# Patient Record
Sex: MALE | Race: WHITE | NOT HISPANIC OR LATINO | Employment: OTHER | URBAN - METROPOLITAN AREA
[De-identification: names, ages, dates, MRNs, and addresses within clinical notes are randomized per-mention and may not be internally consistent; named-entity substitution may affect disease eponyms.]

---

## 2018-05-07 ENCOUNTER — OFFICE VISIT CONVERTED (OUTPATIENT)
Dept: FAMILY MEDICINE CLINIC | Facility: CLINIC | Age: 53
End: 2018-05-07
Attending: FAMILY MEDICINE

## 2019-03-04 ENCOUNTER — OFFICE VISIT CONVERTED (OUTPATIENT)
Dept: PODIATRY | Facility: CLINIC | Age: 54
End: 2019-03-04
Attending: PODIATRIST

## 2019-04-05 ENCOUNTER — HOSPITAL ENCOUNTER (OUTPATIENT)
Dept: LAB | Facility: HOSPITAL | Age: 54
Discharge: HOME | End: 2019-04-05
Attending: PHYSICIAN ASSISTANT

## 2019-04-05 LAB
ALBUMIN SERPL-MCNC: 4.5 G/DL (ref 3.5–5)
ALBUMIN/GLOB SERPL: 1.6 {RATIO} (ref 1.4–2.6)
ALP SERPL-CCNC: 52 U/L (ref 56–119)
ALT SERPL-CCNC: 43 U/L (ref 10–40)
ANION GAP SERPL CALC-SCNC: 21 MMOL/L (ref 8–19)
APPEARANCE UR: CLEAR
AST SERPL-CCNC: 33 U/L (ref 15–50)
BASOPHILS # BLD AUTO: 0.04 10*3/UL (ref 0–0.2)
BASOPHILS NFR BLD AUTO: 0.5 % (ref 0–3)
BILIRUB SERPL-MCNC: 0.68 MG/DL (ref 0.2–1.3)
BILIRUB UR QL: NEGATIVE
BUN SERPL-MCNC: 16 MG/DL (ref 5–25)
BUN/CREAT SERPL: 12 {RATIO} (ref 6–20)
CALCIUM SERPL-MCNC: 9.5 MG/DL (ref 8.7–10.4)
CHLORIDE SERPL-SCNC: 101 MMOL/L (ref 99–111)
CHOLEST SERPL-MCNC: 195 MG/DL (ref 107–200)
CHOLEST/HDLC SERPL: 3.9 {RATIO} (ref 3–6)
COLOR UR: YELLOW
CONV ABS IMM GRAN: 0.05 10*3/UL (ref 0–0.2)
CONV CO2: 22 MMOL/L (ref 22–32)
CONV COLLECTION SOURCE (UA): NORMAL
CONV IMMATURE GRAN: 0.6 % (ref 0–1.8)
CONV TOTAL PROTEIN: 7.3 G/DL (ref 6.3–8.2)
CONV UROBILINOGEN IN URINE BY AUTOMATED TEST STRIP: 0.2 {EHRLICHU}/DL (ref 0.1–1)
CREAT UR-MCNC: 1.32 MG/DL (ref 0.7–1.2)
DEPRECATED RDW RBC AUTO: 46.2 FL (ref 35.1–43.9)
EOSINOPHIL # BLD AUTO: 0.2 10*3/UL (ref 0–0.7)
EOSINOPHIL # BLD AUTO: 2.4 % (ref 0–7)
ERYTHROCYTE [DISTWIDTH] IN BLOOD BY AUTOMATED COUNT: 13 % (ref 11.6–14.4)
GFR SERPLBLD BASED ON 1.73 SQ M-ARVRAT: >60 ML/MIN/{1.73_M2}
GLOBULIN UR ELPH-MCNC: 2.8 G/DL (ref 2–3.5)
GLUCOSE SERPL-MCNC: 93 MG/DL (ref 70–99)
GLUCOSE UR QL: NEGATIVE MG/DL
HBA1C MFR BLD: 16.8 G/DL (ref 14–18)
HCT VFR BLD AUTO: 51.5 % (ref 42–52)
HDLC SERPL-MCNC: 50 MG/DL (ref 40–60)
HGB UR QL STRIP: NEGATIVE
KETONES UR QL STRIP: NEGATIVE MG/DL
LDLC SERPL CALC-MCNC: 99 MG/DL (ref 70–100)
LEUKOCYTE ESTERASE UR QL STRIP: NEGATIVE
LYMPHOCYTES # BLD AUTO: 1.66 10*3/UL (ref 1–5)
MCH RBC QN AUTO: 31.5 PG (ref 27–31)
MCHC RBC AUTO-ENTMCNC: 32.6 G/DL (ref 33–37)
MCV RBC AUTO: 96.6 FL (ref 80–96)
MONOCYTES # BLD AUTO: 0.93 10*3/UL (ref 0.2–1.2)
MONOCYTES NFR BLD AUTO: 11.1 % (ref 3–10)
NEUTROPHILS # BLD AUTO: 5.49 10*3/UL (ref 2–8)
NEUTROPHILS NFR BLD AUTO: 65.6 % (ref 30–85)
NITRITE UR QL STRIP: NEGATIVE
NRBC CBCN: 0 % (ref 0–0.7)
OSMOLALITY SERPL CALC.SUM OF ELEC: 291 MOSM/KG (ref 273–304)
PH UR STRIP.AUTO: 5.5 [PH] (ref 5–8)
PLATELET # BLD AUTO: 226 10*3/UL (ref 130–400)
PMV BLD AUTO: 11.4 FL (ref 9.4–12.4)
POTASSIUM SERPL-SCNC: 4.2 MMOL/L (ref 3.5–5.3)
PROT UR QL: NEGATIVE MG/DL
PSA SERPL-MCNC: 1.17 NG/ML (ref 0–4)
RBC # BLD AUTO: 5.33 10*6/UL (ref 4.7–6.1)
SODIUM SERPL-SCNC: 140 MMOL/L (ref 135–147)
SP GR UR: 1.02 (ref 1–1.03)
T4 FREE SERPL-MCNC: 1.1 NG/DL (ref 0.9–1.8)
TESTOST SERPL-MCNC: 432 NG/DL (ref 193–740)
TRIGL SERPL-MCNC: 231 MG/DL (ref 40–150)
TSH SERPL-ACNC: 6.33 M[IU]/L (ref 0.27–4.2)
VARIANT LYMPHS NFR BLD MANUAL: 19.8 % (ref 20–45)
VLDLC SERPL-MCNC: 46 MG/DL (ref 5–37)
WBC # BLD AUTO: 8.37 10*3/UL (ref 4.8–10.8)

## 2019-04-19 ENCOUNTER — OFFICE VISIT CONVERTED (OUTPATIENT)
Dept: FAMILY MEDICINE CLINIC | Facility: CLINIC | Age: 54
End: 2019-04-19
Attending: PHYSICIAN ASSISTANT

## 2019-06-20 ENCOUNTER — HOSPITAL ENCOUNTER (OUTPATIENT)
Dept: LAB | Facility: HOSPITAL | Age: 54
Discharge: HOME | End: 2019-06-20
Attending: PHYSICIAN ASSISTANT

## 2019-06-20 LAB
25(OH)D3 SERPL-MCNC: 45.7 NG/ML (ref 30–100)
ALBUMIN SERPL-MCNC: 4.2 G/DL (ref 3.5–5)
ALBUMIN/GLOB SERPL: 1.6 {RATIO} (ref 1.4–2.6)
ALP SERPL-CCNC: 50 U/L (ref 56–119)
ALT SERPL-CCNC: 44 U/L (ref 10–40)
ANION GAP SERPL CALC-SCNC: 16 MMOL/L (ref 8–19)
APPEARANCE UR: CLEAR
AST SERPL-CCNC: 28 U/L (ref 15–50)
BASOPHILS # BLD AUTO: 0.05 10*3/UL (ref 0–0.2)
BASOPHILS NFR BLD AUTO: 0.6 % (ref 0–3)
BILIRUB SERPL-MCNC: 0.65 MG/DL (ref 0.2–1.3)
BILIRUB UR QL: NEGATIVE
BUN SERPL-MCNC: 14 MG/DL (ref 5–25)
BUN/CREAT SERPL: 12 {RATIO} (ref 6–20)
CALCIUM SERPL-MCNC: 9.1 MG/DL (ref 8.7–10.4)
CHLORIDE SERPL-SCNC: 100 MMOL/L (ref 99–111)
CHOLEST SERPL-MCNC: 184 MG/DL (ref 107–200)
CHOLEST/HDLC SERPL: 4.2 {RATIO} (ref 3–6)
COLOR UR: YELLOW
CONV ABS IMM GRAN: 0.04 10*3/UL (ref 0–0.2)
CONV CO2: 25 MMOL/L (ref 22–32)
CONV COLLECTION SOURCE (UA): NORMAL
CONV IMMATURE GRAN: 0.5 % (ref 0–1.8)
CONV TOTAL PROTEIN: 6.8 G/DL (ref 6.3–8.2)
CONV UROBILINOGEN IN URINE BY AUTOMATED TEST STRIP: 0.2 {EHRLICHU}/DL (ref 0.1–1)
CREAT UR-MCNC: 1.13 MG/DL (ref 0.7–1.2)
DEPRECATED RDW RBC AUTO: 47.7 FL (ref 35.1–43.9)
EOSINOPHIL # BLD AUTO: 0.23 10*3/UL (ref 0–0.7)
EOSINOPHIL # BLD AUTO: 3 % (ref 0–7)
ERYTHROCYTE [DISTWIDTH] IN BLOOD BY AUTOMATED COUNT: 13.1 % (ref 11.6–14.4)
FOLATE SERPL-MCNC: 13 NG/ML (ref 4.8–20)
GFR SERPLBLD BASED ON 1.73 SQ M-ARVRAT: >60 ML/MIN/{1.73_M2}
GLOBULIN UR ELPH-MCNC: 2.6 G/DL (ref 2–3.5)
GLUCOSE SERPL-MCNC: 79 MG/DL (ref 70–99)
GLUCOSE UR QL: NEGATIVE MG/DL
HBA1C MFR BLD: 16.9 G/DL (ref 14–18)
HCT VFR BLD AUTO: 52.6 % (ref 42–52)
HDLC SERPL-MCNC: 44 MG/DL (ref 40–60)
HGB UR QL STRIP: NEGATIVE
KETONES UR QL STRIP: NEGATIVE MG/DL
LDLC SERPL CALC-MCNC: 96 MG/DL (ref 70–100)
LEUKOCYTE ESTERASE UR QL STRIP: NEGATIVE
LYMPHOCYTES # BLD AUTO: 1.78 10*3/UL (ref 1–5)
MCH RBC QN AUTO: 31.8 PG (ref 27–31)
MCHC RBC AUTO-ENTMCNC: 32.1 G/DL (ref 33–37)
MCV RBC AUTO: 98.9 FL (ref 80–96)
MONOCYTES # BLD AUTO: 0.77 10*3/UL (ref 0.2–1.2)
MONOCYTES NFR BLD AUTO: 10 % (ref 3–10)
NEUTROPHILS # BLD AUTO: 4.86 10*3/UL (ref 2–8)
NEUTROPHILS NFR BLD AUTO: 62.9 % (ref 30–85)
NITRITE UR QL STRIP: NEGATIVE
NRBC CBCN: 0 % (ref 0–0.7)
OSMOLALITY SERPL CALC.SUM OF ELEC: 283 MOSM/KG (ref 273–304)
PH UR STRIP.AUTO: 5.5 [PH] (ref 5–8)
PLATELET # BLD AUTO: 240 10*3/UL (ref 130–400)
PMV BLD AUTO: 11.8 FL (ref 9.4–12.4)
POTASSIUM SERPL-SCNC: 4.3 MMOL/L (ref 3.5–5.3)
PROT UR QL: NEGATIVE MG/DL
RBC # BLD AUTO: 5.32 10*6/UL (ref 4.7–6.1)
SODIUM SERPL-SCNC: 137 MMOL/L (ref 135–147)
SP GR UR: 1.02 (ref 1–1.03)
T4 FREE SERPL-MCNC: 0.9 NG/DL (ref 0.9–1.8)
TESTOST SERPL-MCNC: 304 NG/DL (ref 193–740)
TRIGL SERPL-MCNC: 218 MG/DL (ref 40–150)
TSH SERPL-ACNC: 2.63 M[IU]/L (ref 0.27–4.2)
VARIANT LYMPHS NFR BLD MANUAL: 23 % (ref 20–45)
VIT B12 SERPL-MCNC: 426 PG/ML (ref 211–911)
VLDLC SERPL-MCNC: 44 MG/DL (ref 5–37)
WBC # BLD AUTO: 7.73 10*3/UL (ref 4.8–10.8)

## 2019-06-28 ENCOUNTER — HOSPITAL ENCOUNTER (OUTPATIENT)
Dept: GENERAL RADIOLOGY | Facility: HOSPITAL | Age: 54
Discharge: HOME | End: 2019-06-28
Attending: OPHTHALMOLOGY

## 2019-07-11 ENCOUNTER — HOSPITAL ENCOUNTER (OUTPATIENT)
Dept: MRI IMAGING | Facility: HOSPITAL | Age: 54
Discharge: HOME | End: 2019-07-11
Attending: OPHTHALMOLOGY

## 2019-10-01 ENCOUNTER — HOSPITAL ENCOUNTER (OUTPATIENT)
Dept: LAB | Facility: HOSPITAL | Age: 54
Discharge: HOME | End: 2019-10-01
Attending: PHYSICIAN ASSISTANT

## 2019-10-01 LAB
CHOLEST SERPL-MCNC: 250 MG/DL (ref 107–200)
CHOLEST/HDLC SERPL: 6.3 {RATIO} (ref 3–6)
HDLC SERPL-MCNC: 40 MG/DL (ref 40–60)
LDLC SERPL CALC-MCNC: 131 MG/DL (ref 70–100)
PSA SERPL-MCNC: 1.36 NG/ML (ref 0–4)
TESTOST SERPL-MCNC: 557 NG/DL (ref 193–740)
TRIGL SERPL-MCNC: 396 MG/DL (ref 40–150)
VLDLC SERPL-MCNC: 79 MG/DL (ref 5–37)

## 2019-11-04 ENCOUNTER — OFFICE VISIT CONVERTED (OUTPATIENT)
Dept: FAMILY MEDICINE CLINIC | Facility: CLINIC | Age: 54
End: 2019-11-04
Attending: PHYSICIAN ASSISTANT

## 2020-01-20 ENCOUNTER — HOSPITAL ENCOUNTER (OUTPATIENT)
Dept: GASTROENTEROLOGY | Facility: HOSPITAL | Age: 55
Setting detail: HOSPITAL OUTPATIENT SURGERY
Discharge: HOME | End: 2020-01-20
Attending: INTERNAL MEDICINE

## 2020-05-04 ENCOUNTER — OFFICE VISIT CONVERTED (OUTPATIENT)
Dept: FAMILY MEDICINE CLINIC | Facility: CLINIC | Age: 55
End: 2020-05-04
Attending: PHYSICIAN ASSISTANT

## 2020-05-05 ENCOUNTER — HOSPITAL ENCOUNTER (OUTPATIENT)
Dept: LAB | Facility: HOSPITAL | Age: 55
Discharge: HOME | End: 2020-05-05
Attending: PHYSICIAN ASSISTANT

## 2020-05-05 LAB
25(OH)D3 SERPL-MCNC: 35.9 NG/ML (ref 30–100)
ALBUMIN SERPL-MCNC: 4.2 G/DL (ref 3.5–5)
ALBUMIN/GLOB SERPL: 1.6 {RATIO} (ref 1.4–2.6)
ALP SERPL-CCNC: 49 U/L (ref 56–119)
ALT SERPL-CCNC: 41 U/L (ref 10–40)
ANION GAP SERPL CALC-SCNC: 21 MMOL/L (ref 8–19)
APPEARANCE UR: CLEAR
AST SERPL-CCNC: 36 U/L (ref 15–50)
BASOPHILS # BLD AUTO: 0.03 10*3/UL (ref 0–0.2)
BASOPHILS NFR BLD AUTO: 0.4 % (ref 0–3)
BILIRUB SERPL-MCNC: 0.4 MG/DL (ref 0.2–1.3)
BILIRUB UR QL: NEGATIVE
BUN SERPL-MCNC: 15 MG/DL (ref 5–25)
BUN/CREAT SERPL: 12 {RATIO} (ref 6–20)
CALCIUM SERPL-MCNC: 9 MG/DL (ref 8.7–10.4)
CHLORIDE SERPL-SCNC: 102 MMOL/L (ref 99–111)
CHOLEST SERPL-MCNC: 162 MG/DL (ref 107–200)
CHOLEST/HDLC SERPL: 3.5 {RATIO} (ref 3–6)
COLOR UR: YELLOW
CONV ABS IMM GRAN: 0.04 10*3/UL (ref 0–0.2)
CONV CO2: 22 MMOL/L (ref 22–32)
CONV COLLECTION SOURCE (UA): NORMAL
CONV IMMATURE GRAN: 0.6 % (ref 0–1.8)
CONV TOTAL PROTEIN: 6.8 G/DL (ref 6.3–8.2)
CONV UROBILINOGEN IN URINE BY AUTOMATED TEST STRIP: 0.2 {EHRLICHU}/DL (ref 0.1–1)
CREAT UR-MCNC: 1.3 MG/DL (ref 0.7–1.2)
DEPRECATED RDW RBC AUTO: 48.2 FL (ref 35.1–43.9)
EOSINOPHIL # BLD AUTO: 0.18 10*3/UL (ref 0–0.7)
EOSINOPHIL # BLD AUTO: 2.6 % (ref 0–7)
ERYTHROCYTE [DISTWIDTH] IN BLOOD BY AUTOMATED COUNT: 13.3 % (ref 11.6–14.4)
GFR SERPLBLD BASED ON 1.73 SQ M-ARVRAT: >60 ML/MIN/{1.73_M2}
GLOBULIN UR ELPH-MCNC: 2.6 G/DL (ref 2–3.5)
GLUCOSE SERPL-MCNC: 83 MG/DL (ref 70–99)
GLUCOSE UR QL: NEGATIVE MG/DL
HCT VFR BLD AUTO: 49.9 % (ref 42–52)
HDLC SERPL-MCNC: 46 MG/DL (ref 40–60)
HGB BLD-MCNC: 16.2 G/DL (ref 14–18)
HGB UR QL STRIP: NEGATIVE
KETONES UR QL STRIP: NEGATIVE MG/DL
LDLC SERPL CALC-MCNC: 68 MG/DL (ref 70–100)
LEUKOCYTE ESTERASE UR QL STRIP: NEGATIVE
LYMPHOCYTES # BLD AUTO: 1.53 10*3/UL (ref 1–5)
LYMPHOCYTES NFR BLD AUTO: 21.9 % (ref 20–45)
MCH RBC QN AUTO: 31.7 PG (ref 27–31)
MCHC RBC AUTO-ENTMCNC: 32.5 G/DL (ref 33–37)
MCV RBC AUTO: 97.7 FL (ref 80–96)
MONOCYTES # BLD AUTO: 0.75 10*3/UL (ref 0.2–1.2)
MONOCYTES NFR BLD AUTO: 10.7 % (ref 3–10)
NEUTROPHILS # BLD AUTO: 4.47 10*3/UL (ref 2–8)
NEUTROPHILS NFR BLD AUTO: 63.8 % (ref 30–85)
NITRITE UR QL STRIP: NEGATIVE
NRBC CBCN: 0 % (ref 0–0.7)
OSMOLALITY SERPL CALC.SUM OF ELEC: 292 MOSM/KG (ref 273–304)
PH UR STRIP.AUTO: 5 [PH] (ref 5–8)
PLATELET # BLD AUTO: 212 10*3/UL (ref 130–400)
PMV BLD AUTO: 11.4 FL (ref 9.4–12.4)
POTASSIUM SERPL-SCNC: 4.4 MMOL/L (ref 3.5–5.3)
PROT UR QL: NEGATIVE MG/DL
PSA SERPL-MCNC: 1.2 NG/ML (ref 0–4)
RBC # BLD AUTO: 5.11 10*6/UL (ref 4.7–6.1)
SODIUM SERPL-SCNC: 141 MMOL/L (ref 135–147)
SP GR UR: 1.02 (ref 1–1.03)
T4 FREE SERPL-MCNC: 0.8 NG/DL (ref 0.9–1.8)
TRIGL SERPL-MCNC: 240 MG/DL (ref 40–150)
TSH SERPL-ACNC: 3.77 M[IU]/L (ref 0.27–4.2)
VLDLC SERPL-MCNC: 48 MG/DL (ref 5–37)
WBC # BLD AUTO: 7 10*3/UL (ref 4.8–10.8)

## 2020-07-29 ENCOUNTER — HOSPITAL ENCOUNTER (OUTPATIENT)
Dept: URGENT CARE | Facility: CLINIC | Age: 55
Discharge: HOME | End: 2020-07-29

## 2020-07-31 ENCOUNTER — OFFICE VISIT CONVERTED (OUTPATIENT)
Dept: INTERNAL MEDICINE | Facility: CLINIC | Age: 55
End: 2020-07-31
Attending: INTERNAL MEDICINE

## 2020-07-31 ENCOUNTER — HOSPITAL ENCOUNTER (OUTPATIENT)
Dept: OTHER | Facility: HOSPITAL | Age: 55
Discharge: HOME | End: 2020-07-31
Attending: INTERNAL MEDICINE

## 2020-07-31 LAB
ALBUMIN SERPL-MCNC: 4.1 G/DL (ref 3.5–5)
ALBUMIN/GLOB SERPL: 1.5 {RATIO} (ref 1.4–2.6)
ALP SERPL-CCNC: 55 U/L (ref 56–119)
ALT SERPL-CCNC: 37 U/L (ref 10–40)
ANION GAP SERPL CALC-SCNC: 22 MMOL/L (ref 8–19)
AST SERPL-CCNC: 28 U/L (ref 15–50)
BASOPHILS # BLD AUTO: 0.02 10*3/UL (ref 0–0.2)
BASOPHILS NFR BLD AUTO: 0.2 % (ref 0–3)
BILIRUB SERPL-MCNC: 0.21 MG/DL (ref 0.2–1.3)
BUN SERPL-MCNC: 18 MG/DL (ref 5–25)
BUN/CREAT SERPL: 14 {RATIO} (ref 6–20)
CALCIUM SERPL-MCNC: 9.5 MG/DL (ref 8.7–10.4)
CHLORIDE SERPL-SCNC: 101 MMOL/L (ref 99–111)
CHOLEST SERPL-MCNC: 170 MG/DL (ref 107–200)
CHOLEST/HDLC SERPL: 3.4 {RATIO} (ref 3–6)
CONV ABS IMM GRAN: 0.05 10*3/UL (ref 0–0.2)
CONV CO2: 24 MMOL/L (ref 22–32)
CONV IMMATURE GRAN: 0.5 % (ref 0–1.8)
CONV TOTAL PROTEIN: 6.9 G/DL (ref 6.3–8.2)
CREAT UR-MCNC: 1.33 MG/DL (ref 0.7–1.2)
DEPRECATED RDW RBC AUTO: 49.1 FL (ref 35.1–43.9)
EOSINOPHIL # BLD AUTO: 0.02 10*3/UL (ref 0–0.7)
EOSINOPHIL # BLD AUTO: 0.2 % (ref 0–7)
ERYTHROCYTE [DISTWIDTH] IN BLOOD BY AUTOMATED COUNT: 13.2 % (ref 11.6–14.4)
EST. AVERAGE GLUCOSE BLD GHB EST-MCNC: 114 MG/DL
GFR SERPLBLD BASED ON 1.73 SQ M-ARVRAT: >60 ML/MIN/{1.73_M2}
GLOBULIN UR ELPH-MCNC: 2.8 G/DL (ref 2–3.5)
GLUCOSE SERPL-MCNC: 93 MG/DL (ref 70–99)
HBA1C MFR BLD: 5.6 % (ref 3.5–5.7)
HCT VFR BLD AUTO: 48.4 % (ref 42–52)
HDLC SERPL-MCNC: 50 MG/DL (ref 40–60)
HGB BLD-MCNC: 15.4 G/DL (ref 14–18)
LDLC SERPL CALC-MCNC: 67 MG/DL (ref 70–100)
LYMPHOCYTES # BLD AUTO: 1.62 10*3/UL (ref 1–5)
LYMPHOCYTES NFR BLD AUTO: 16.8 % (ref 20–45)
MCH RBC QN AUTO: 31.8 PG (ref 27–31)
MCHC RBC AUTO-ENTMCNC: 31.8 G/DL (ref 33–37)
MCV RBC AUTO: 99.8 FL (ref 80–96)
MONOCYTES # BLD AUTO: 1.1 10*3/UL (ref 0.2–1.2)
MONOCYTES NFR BLD AUTO: 11.4 % (ref 3–10)
NEUTROPHILS # BLD AUTO: 6.81 10*3/UL (ref 2–8)
NEUTROPHILS NFR BLD AUTO: 70.9 % (ref 30–85)
NRBC CBCN: 0 % (ref 0–0.7)
OSMOLALITY SERPL CALC.SUM OF ELEC: 298 MOSM/KG (ref 273–304)
PLATELET # BLD AUTO: 233 10*3/UL (ref 130–400)
PMV BLD AUTO: 12.5 FL (ref 9.4–12.4)
POTASSIUM SERPL-SCNC: 4.3 MMOL/L (ref 3.5–5.3)
RBC # BLD AUTO: 4.85 10*6/UL (ref 4.7–6.1)
SODIUM SERPL-SCNC: 143 MMOL/L (ref 135–147)
TESTOST SERPL-MCNC: 840 NG/DL (ref 193–740)
TRIGL SERPL-MCNC: 263 MG/DL (ref 40–150)
TSH SERPL-ACNC: 1.69 M[IU]/L (ref 0.27–4.2)
URATE SERPL-MCNC: 8.8 MG/DL (ref 3.5–8.5)
VLDLC SERPL-MCNC: 53 MG/DL (ref 5–37)
WBC # BLD AUTO: 9.62 10*3/UL (ref 4.8–10.8)

## 2020-10-30 ENCOUNTER — HOSPITAL ENCOUNTER (OUTPATIENT)
Dept: LAB | Facility: HOSPITAL | Age: 55
Discharge: HOME | End: 2020-10-30
Attending: INTERNAL MEDICINE

## 2020-10-30 LAB
ALBUMIN SERPL-MCNC: 4.3 G/DL (ref 3.5–5)
ALBUMIN/GLOB SERPL: 1.7 {RATIO} (ref 1.4–2.6)
ALP SERPL-CCNC: 62 U/L (ref 56–119)
ALT SERPL-CCNC: 56 U/L (ref 10–40)
ANION GAP SERPL CALC-SCNC: 16 MMOL/L (ref 8–19)
AST SERPL-CCNC: 43 U/L (ref 15–50)
BASOPHILS # BLD AUTO: 0.03 10*3/UL (ref 0–0.2)
BASOPHILS NFR BLD AUTO: 0.5 % (ref 0–3)
BILIRUB SERPL-MCNC: 0.39 MG/DL (ref 0.2–1.3)
BUN SERPL-MCNC: 12 MG/DL (ref 5–25)
BUN/CREAT SERPL: 11 {RATIO} (ref 6–20)
CALCIUM SERPL-MCNC: 9.2 MG/DL (ref 8.7–10.4)
CHLORIDE SERPL-SCNC: 104 MMOL/L (ref 99–111)
CHOLEST SERPL-MCNC: 165 MG/DL (ref 107–200)
CHOLEST/HDLC SERPL: 3.4 {RATIO} (ref 3–6)
CONV ABS IMM GRAN: 0.05 10*3/UL (ref 0–0.2)
CONV CO2: 24 MMOL/L (ref 22–32)
CONV IMMATURE GRAN: 0.8 % (ref 0–1.8)
CONV TOTAL PROTEIN: 6.8 G/DL (ref 6.3–8.2)
CREAT UR-MCNC: 1.08 MG/DL (ref 0.7–1.2)
DEPRECATED RDW RBC AUTO: 47.3 FL (ref 35.1–43.9)
EOSINOPHIL # BLD AUTO: 0.23 10*3/UL (ref 0–0.7)
EOSINOPHIL # BLD AUTO: 3.7 % (ref 0–7)
ERYTHROCYTE [DISTWIDTH] IN BLOOD BY AUTOMATED COUNT: 13.2 % (ref 11.6–14.4)
GFR SERPLBLD BASED ON 1.73 SQ M-ARVRAT: >60 ML/MIN/{1.73_M2}
GLOBULIN UR ELPH-MCNC: 2.5 G/DL (ref 2–3.5)
GLUCOSE SERPL-MCNC: 90 MG/DL (ref 70–99)
HCT VFR BLD AUTO: 50.6 % (ref 42–52)
HDLC SERPL-MCNC: 49 MG/DL (ref 40–60)
HGB BLD-MCNC: 16.6 G/DL (ref 14–18)
LDLC SERPL CALC-MCNC: 70 MG/DL (ref 70–100)
LYMPHOCYTES # BLD AUTO: 1.64 10*3/UL (ref 1–5)
LYMPHOCYTES NFR BLD AUTO: 26.7 % (ref 20–45)
MCH RBC QN AUTO: 31.6 PG (ref 27–31)
MCHC RBC AUTO-ENTMCNC: 32.8 G/DL (ref 33–37)
MCV RBC AUTO: 96.2 FL (ref 80–96)
MONOCYTES # BLD AUTO: 0.61 10*3/UL (ref 0.2–1.2)
MONOCYTES NFR BLD AUTO: 9.9 % (ref 3–10)
NEUTROPHILS # BLD AUTO: 3.58 10*3/UL (ref 2–8)
NEUTROPHILS NFR BLD AUTO: 58.4 % (ref 30–85)
NRBC CBCN: 0 % (ref 0–0.7)
OSMOLALITY SERPL CALC.SUM OF ELEC: 289 MOSM/KG (ref 273–304)
PLATELET # BLD AUTO: 214 10*3/UL (ref 130–400)
PMV BLD AUTO: 11.5 FL (ref 9.4–12.4)
POTASSIUM SERPL-SCNC: 4.3 MMOL/L (ref 3.5–5.3)
RBC # BLD AUTO: 5.26 10*6/UL (ref 4.7–6.1)
SODIUM SERPL-SCNC: 140 MMOL/L (ref 135–147)
TESTOST SERPL-MCNC: 313 NG/DL (ref 193–740)
TRIGL SERPL-MCNC: 232 MG/DL (ref 40–150)
TSH SERPL-ACNC: 2.48 M[IU]/L (ref 0.27–4.2)
VLDLC SERPL-MCNC: 46 MG/DL (ref 5–37)
WBC # BLD AUTO: 6.14 10*3/UL (ref 4.8–10.8)

## 2020-11-01 LAB — TESTOSTERONE, FREE: 14.3 PG/ML (ref 7.2–24)

## 2020-11-06 ENCOUNTER — OFFICE VISIT CONVERTED (OUTPATIENT)
Dept: INTERNAL MEDICINE | Facility: CLINIC | Age: 55
End: 2020-11-06
Attending: INTERNAL MEDICINE

## 2020-12-15 ENCOUNTER — HOSPITAL ENCOUNTER (OUTPATIENT)
Dept: GENERAL RADIOLOGY | Facility: HOSPITAL | Age: 55
Discharge: HOME | End: 2020-12-15
Attending: INTERNAL MEDICINE

## 2021-01-15 ENCOUNTER — OFFICE VISIT CONVERTED (OUTPATIENT)
Dept: INTERNAL MEDICINE | Facility: CLINIC | Age: 56
End: 2021-01-15
Attending: INTERNAL MEDICINE

## 2021-01-30 ENCOUNTER — HOSPITAL ENCOUNTER (OUTPATIENT)
Dept: PREADMISSION TESTING | Facility: HOSPITAL | Age: 56
Discharge: HOME | End: 2021-01-30
Attending: INTERNAL MEDICINE

## 2021-01-31 LAB — SARS-COV-2 RNA SPEC QL NAA+PROBE: NOT DETECTED

## 2021-02-04 ENCOUNTER — HOSPITAL ENCOUNTER (OUTPATIENT)
Dept: GASTROENTEROLOGY | Facility: HOSPITAL | Age: 56
Setting detail: HOSPITAL OUTPATIENT SURGERY
Discharge: HOME | End: 2021-02-04
Attending: INTERNAL MEDICINE

## 2021-03-22 ENCOUNTER — OUTSIDE FACILITY SERVICE (OUTPATIENT)
Dept: SLEEP MEDICINE | Facility: HOSPITAL | Age: 56
End: 2021-03-22

## 2021-03-22 ENCOUNTER — HOSPITAL ENCOUNTER (OUTPATIENT)
Dept: SLEEP MEDICINE | Facility: HOSPITAL | Age: 56
Discharge: HOME | End: 2021-03-22
Attending: INTERNAL MEDICINE

## 2021-03-22 PROCEDURE — 99244 OFF/OP CNSLTJ NEW/EST MOD 40: CPT | Performed by: INTERNAL MEDICINE

## 2021-04-19 ENCOUNTER — OFFICE VISIT CONVERTED (OUTPATIENT)
Dept: INTERNAL MEDICINE | Facility: CLINIC | Age: 56
End: 2021-04-19
Attending: INTERNAL MEDICINE

## 2021-04-19 LAB
AMPHET UR QL CFM: NEGATIVE
BARBITURATES UR QL: NEGATIVE
BENZODIAZ UR QL SCN: NEGATIVE
CONV AMP/METHAMP UR: NEGATIVE
CONV COCAINE, UR: NEGATIVE
MDMA UR QL SCN: NEGATIVE
METHADONE UR QL SCN: NEGATIVE
OPIATES UR QL SCN: NEGATIVE
OXYCODONE UR QL SCN: NEGATIVE
PCP UR QL: NEGATIVE
THC SERPLBLD CFM-MCNC: NEGATIVE NG/ML

## 2021-04-26 ENCOUNTER — HOSPITAL ENCOUNTER (OUTPATIENT)
Dept: LAB | Facility: HOSPITAL | Age: 56
Discharge: HOME | End: 2021-04-26
Attending: INTERNAL MEDICINE

## 2021-04-26 LAB
ALBUMIN SERPL-MCNC: 4.4 G/DL (ref 3.5–5)
ALBUMIN/GLOB SERPL: 1.5 {RATIO} (ref 1.4–2.6)
ALP SERPL-CCNC: 58 U/L (ref 56–119)
ALT SERPL-CCNC: 50 U/L (ref 10–40)
ANION GAP SERPL CALC-SCNC: 19 MMOL/L (ref 8–19)
AST SERPL-CCNC: 37 U/L (ref 15–50)
BASOPHILS # BLD AUTO: 0.03 10*3/UL (ref 0–0.2)
BASOPHILS NFR BLD AUTO: 0.4 % (ref 0–3)
BILIRUB SERPL-MCNC: 0.36 MG/DL (ref 0.2–1.3)
BUN SERPL-MCNC: 15 MG/DL (ref 5–25)
BUN/CREAT SERPL: 13 {RATIO} (ref 6–20)
CALCIUM SERPL-MCNC: 9.4 MG/DL (ref 8.7–10.4)
CHLORIDE SERPL-SCNC: 99 MMOL/L (ref 99–111)
CHOLEST SERPL-MCNC: 238 MG/DL (ref 107–200)
CHOLEST/HDLC SERPL: 5.1 {RATIO} (ref 3–6)
CONV ABS IMM GRAN: 0.05 10*3/UL (ref 0–0.2)
CONV CO2: 23 MMOL/L (ref 22–32)
CONV IMMATURE GRAN: 0.7 % (ref 0–1.8)
CONV TOTAL PROTEIN: 7.3 G/DL (ref 6.3–8.2)
CREAT UR-MCNC: 1.15 MG/DL (ref 0.7–1.2)
DEPRECATED RDW RBC AUTO: 49.5 FL (ref 35.1–43.9)
EOSINOPHIL # BLD AUTO: 0.23 10*3/UL (ref 0–0.7)
EOSINOPHIL # BLD AUTO: 3.1 % (ref 0–7)
ERYTHROCYTE [DISTWIDTH] IN BLOOD BY AUTOMATED COUNT: 13.7 % (ref 11.6–14.4)
GFR SERPLBLD BASED ON 1.73 SQ M-ARVRAT: >60 ML/MIN/{1.73_M2}
GLOBULIN UR ELPH-MCNC: 2.9 G/DL (ref 2–3.5)
GLUCOSE SERPL-MCNC: 85 MG/DL (ref 70–99)
HCT VFR BLD AUTO: 53.1 % (ref 42–52)
HDLC SERPL-MCNC: 47 MG/DL (ref 40–60)
HGB BLD-MCNC: 17.5 G/DL (ref 14–18)
LDLC SERPL CALC-MCNC: 128 MG/DL (ref 70–100)
LYMPHOCYTES # BLD AUTO: 1.71 10*3/UL (ref 1–5)
LYMPHOCYTES NFR BLD AUTO: 23 % (ref 20–45)
MCH RBC QN AUTO: 32 PG (ref 27–31)
MCHC RBC AUTO-ENTMCNC: 33 G/DL (ref 33–37)
MCV RBC AUTO: 97.1 FL (ref 80–96)
MONOCYTES # BLD AUTO: 0.73 10*3/UL (ref 0.2–1.2)
MONOCYTES NFR BLD AUTO: 9.8 % (ref 3–10)
NEUTROPHILS # BLD AUTO: 4.69 10*3/UL (ref 2–8)
NEUTROPHILS NFR BLD AUTO: 63 % (ref 30–85)
NRBC CBCN: 0 % (ref 0–0.7)
OSMOLALITY SERPL CALC.SUM OF ELEC: 284 MOSM/KG (ref 273–304)
PLATELET # BLD AUTO: 235 10*3/UL (ref 130–400)
PMV BLD AUTO: 11.4 FL (ref 9.4–12.4)
POTASSIUM SERPL-SCNC: 4.3 MMOL/L (ref 3.5–5.3)
PSA SERPL-MCNC: 1.51 NG/ML (ref 0–4)
RBC # BLD AUTO: 5.47 10*6/UL (ref 4.7–6.1)
SODIUM SERPL-SCNC: 137 MMOL/L (ref 135–147)
T4 FREE SERPL-MCNC: 0.7 NG/DL (ref 0.9–1.8)
TESTOST SERPL-MCNC: 477 NG/DL (ref 193–740)
TRIGL SERPL-MCNC: 509 MG/DL (ref 40–150)
TSH SERPL-ACNC: 7.61 M[IU]/L (ref 0.27–4.2)
WBC # BLD AUTO: 7.44 10*3/UL (ref 4.8–10.8)

## 2021-05-10 NOTE — H&P
"   History and Physical      Patient Name: Jose Louie   Patient ID: [id number]   Sex: Male   YOB: 1965    Primary Care Provider: Kevin Maldonado MD   Referring Provider: Kevin Maldonado MD    Visit Date: July 31, 2020    Provider: Azam Lloyd MD   Location: [facility]   Location Address: [address] [place] [address], [address]  [place], [place]  [id number]   Location Phone: [phone]          Chief Complaint  · New Patient/ Establish Care  · \" foot pain and swelling \"      History Of Present Illness  Jose Louie is a 55 year old /White male who presents for evaluation and treatment of:      Last PCP: Leoncio Willis   Last Labs: Feb 2020  Colonoscopy: 1/2020  PSA: 1/2020  Flu: 19-20 yes    pt does report chronic foot pain s/p several surgical procedures. pt also with left ankle pain and swelling that worsned for 4-5 days. pt reports intermittent. pt had f/u with orthopedic, who did not recommend further surgical intervention. pt took steroids recently with great relief. 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 pt denies HI and SI. pt notices panic attacks with stressful situations.       Past Medical History  Disease Name Date Onset Notes   Allergies --  --    Anxiety --  --    Arthritis --  --    Back Pain --  --    CPAP use counseling 11/27/2017 --    Deafness --  --    Essential hypertension 05/31/2017 --    Fungal toenail infection 03/04/2019 --    Heel pain --  --    Hemorrhoid --  --    High cholesterol --  --    Hyperlipidemia 05/31/2017 --    Hypertension 12/08/2014 --    Hypogonadism (Testicular Failure) 12/08/2014 --    Hypothyroidism --  --    Low testosterone level in male 05/31/2017 --    Night sweats --  --    Panic disorder 12/09/2014 --    Reflux --  " --    Sleep Apnea 11/27/2017 --    Sleep apnea in adult 09/19/2016 --          Past Surgical History  Procedure Name Date Notes   Ankle repair 1983,2017 --    Colonoscopy 1/2020 [facility]ie, History of Colon Polyps   EGD (Endoscopy) 2020 [facility]   Vasectomy 1996 --          Medication List  Name Date Started Instructions   atorvastatin 20 mg oral tablet 11/27/2019 take 1 tablet (20 mg) by oral route once daily at bedtime   cpap  use nightly   Daily Multiple For Men 0.4 mg oral tablet  --    levothyroxine 137 mcg oral tablet 05/06/2020 take 1 tablet (137 mcg) by oral route once daily for 30 days   Lexapro 10 mg oral tablet 06/18/2019 take 1 tablet (10 mg) by oral route once daily for 30 days   Protonix 40 mg oral tablet,delayed release (DR/EC)  --    testosterone cypionate 200 mg/mL intramuscular oil 07/06/2020 200 mg weekly for 90 days   valsartan 80 mg oral tablet 05/11/2020 TAKE 1 TABLET BY MOUTH ONCE DAILY for 30 days         Allergy List  Allergen Name Date Reaction Notes   NO KNOWN DRUG ALLERGIES --  --  --        Allergies Reconciled  Family Medical History  Disease Name Relative/Age Notes   Stroke Mother/   --    Heart Disease Father/   --    Diabetes, unspecified type  --    No family history of colorectal cancer  --          Social History  Finding Status Start/Stop Quantity Notes   Alcohol Current some day 30/-- Socially --     --  --/-- --  --    Moderate Amount of Exercise (1-3 times weekly) --  --/-- --  --    No known infection risk --  --/-- --  --    Smokeless tobacco Former 29/46 1-2 --    Tobacco Never --/-- --  --          Immunizations  NameDate Admin Mfg Trade Name Lot Number Route Inj VIS Given VIS Publication   Tdap07/21/2017 SKB BOOSTRIX [id number] IM LD 07/21/2017 01/24/2012   Comments: Tolerated well         Review of Systems  · Constitutional  o Denies  o : fatigue, fever, weight gain, weight loss, chills  · Eyes  o Denies  o : changes in vision, eye pain  · HENT  o Denies  o : ear pain, sore  "throat  · Cardiovascular  o Denies  o : chest Pain, palpitations, edema (swelling)  · Respiratory  o Denies  o : frequent cough, shortness of breath  · Gastrointestinal  o Denies  o : nausea, vomiting, changes in bowel habits  · Genitourinary  o Denies  o : dysuria, urinary frequency, urinary urgency, polyuria  · Integument  o Denies  o : rash, lesions  · Neurologic  o Denies  o : headache, tingling or numbness, dizziness  · Musculoskeletal  o Admits  o : foot pain, swelling  · Psychiatric  o Admits  o : anxiety  o Denies  o : mood changes, memory changes  · Heme-Lymph  o Denies  o : easy bruising, easy bleeding  · Allergic-Immunologic  o Denies  o : eczema, urticaria      Vitals  Date Time BP Position Site L\R Cuff Size HR RR TEMP (F) WT  HT  BMI kg/m2 BSA m2 O2 Sat HC       11/04/2019 11:34 /68 Sitting    99 - R   224lbs 2oz 5'  9\" 33.1 2.22 98 %    05/04/2020 09:59 /85 Sitting    96 - R   229lbs 2oz 5'  9\" 33.84 2.25 98 %    07/31/2020 02:30 /68 Sitting    85 - R  97.5 230lbs 2oz 5'  9\" 33.98 2.25 96 %          Physical Examination  · Constitutional  o Appearance  o : no acute distress, well-nourished  · Head and Face  o Head  o :   § Inspection  § : atraumatic, normocephalic  · Eyes  o Eyes  o : extraocular movements intact, no scleral icterus, no conjunctival injection  · Ears, Nose, Mouth and Throat  o Ears  o :   § External Ears  § : normal  o Nose  o :   § Intranasal Exam  § : nares patent  o Oral Cavity  o :   § Oral Mucosa  § : moist mucous membranes  · Respiratory  o Respiratory Effort  o : breathing comfortably, symmetric chest rise  o Auscultation of Lungs  o : clear to asculatation bilaterally, no wheezes, rales, or rhonchii  · Cardiovascular  o Heart  o :   § Auscultation of Heart  § : regular rate and rhythm, no murmurs, rubs, or gallops  o Peripheral Vascular System  o :   § Extremities  § : no edema  · Neurologic  o Mental Status Examination  o :   § Orientation  § : grossly " oriented to person, place and time  o Gait and Station  o :   § Gait Screening  § : normal gait  · Psychiatric  o General  o : normal mood and affect          Assessment  · Hyperlipidemia     272.4/E78.5  check labs. cont statin.  · Hypertension     401.9/I10  well controlled on current regimen.  · Panic disorder     300.01/F41.0  cont Lexapro. pt denies HI and SI.   · Hypothyroidism     244.9/E03.9  check TSH and adjust synthroid accordingly  · Hypogonadism, male     257.2/E29.1  check testosterone level and cont supp.  · Impaired fasting glucose     790.21/R73.01  check HgbA1c  · Gout     274.9/M10.9  check uric acid levels    Problems Reconciled  Plan  · Orders  o Testosterone (Total) (41236) - 257.2/E29.1 - 07/31/2020  o Hgb A1c [facility] (60368) - 790.21/R73.01 - 07/31/2020  o Physical, Primary Care Panel (CBC, CMP, Lipid, TSH) [facility] (52922, 57084, 96242, 58465) - 401.9/I10, 272.4/E78.5, 257.2/E29.1, 790.21/R73.01 - 07/31/2020  o ACO-39: Current medications updated and reviewed () - - 07/31/2020  o ACO-19: Colorectal cancer screening results documented and reviewed (3017F) - - 07/31/2020  o Uric Acid Serum [facility] (45611) - 274.9/M10.9 - 07/31/2020  · Medications  o Voltaren 1 % topical gel   SIG: apply 2 grams to the affected area(s) by topical route 4 times per day   DISP: (1) 100 gm tube with 3 refills  Prescribed on 07/31/2020     o Medications have been Reconciled  o Transition of Care or Provider Policy  · Instructions  o Patient was educated/instructed on their diagnosis, treatment and medications prior to discharge from the clinic today.  · Disposition  o f/u in 3 months  o labs done in clinic            Electronically Signed by: Azam Lloyd MD -Author on July 31, 2020 04:58:00 PM

## 2021-05-13 NOTE — PROGRESS NOTES
Progress Note      Patient Name: Jose Louie   Patient ID: [id number]   Sex: Male   YOB: 1965    Primary Care Provider: Kevin Maldonado MD   Referring Provider: Kevin Maldonado MD    Visit Date: May 4, 2020    Provider: Leoncio Willis PA-C   [facility]   Location Address: [address] [place] [address], [address]  [place]  [id number]   Location Phone: [phone]          Chief Complaint  · 6 month follow up       History Of Present Illness  Jose Louie is a 54 year old /White male who presents for evaluation and treatment of: 6 month follow up.      pt presents today for 6 month follow up.    Valsartan 80 mg QD    No change in BM     Patient is concerned about possible hyperthyroidism.  He states that he has noticed some increase in his anxiety sweating blood pressure systolic.  He denies any chest pain shortness of breath or headache.  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    We discussed him splitting the valsartan 80 mg to twice a day because he states that he becomes hypotensive at times.           Past Medical History  Disease Name Date Onset Notes   Arthritis --  --    Back Pain --  --    CPAP use counseling 11/27/2017 --    Essential hypertension 05/31/2017 --    Fungal toenail infection 03/04/2019 --    Heel pain --  --    High cholesterol --  --    Hyperlipidemia 05/31/2017 --    Hypertension 12/08/2014 --    Hypogonadism (Testicular Failure) 12/08/2014 --    Hypothyroidism --  --    Low testosterone level in male 05/31/2017 --    Panic disorder 12/09/2014 --    Sleep apnea 11/27/2017 --    Sleep apnea in adult 09/19/2016 --          Past Surgical History  Procedure Name Date Notes   Ankle repair 1983 --    Colonoscopy 2016 [facility], History of Colon Polyps   EGD (Endoscopy) 2016 [facility]   Vasectomy 1996 --          Medication List  Name Date Started Instructions   Ativan 0.5 mg oral tablet  11/04/2019 take 1 tablet (0.5 mg) by oral route 2 times per day as needed   atorvastatin 20 mg oral tablet 11/27/2019 take 1 tablet (20 mg) by oral route once daily at bedtime   cpap  use nightly   Daily Multiple For Men 0.4 mg oral tablet  --    escitalopram oxalate 10 mg oral tablet 12/02/2019 TAKE 1 TABLET BY MOUTH ONCE DAILY FOR 30 DAYS   levothyroxine 125 mcg oral tablet 02/24/2020 TAKE 1 TABLET BY MOUTH ONCE DAILY   Lexapro 10 mg oral tablet 06/18/2019 take 1 tablet (10 mg) by oral route once daily for 30 days   lovastatin 10 mg oral tablet  --    Suprep Bowel Prep Kit 17.5-3.13-1.6 gram oral recon soln 11/04/2019 Follow the instructions provided by the providers office   testosterone cypionate 200 mg/mL intramuscular oil 12/10/2019 200 mg weekly for 90 days   valsartan 80 mg oral tablet 11/04/2019 take 1 tablet (80 mg) by oral route once daily         Allergy List  Allergen Name Date Reaction Notes   NO KNOWN DRUG ALLERGIES --  --  --          Family Medical History  Disease Name Relative/Age Notes   Heart Disease Father/   --    Diabetes, unspecified type  --    No family history of colorectal cancer  --          Social History  Finding Status Start/Stop Quantity Notes   Alcohol Current some day 30/-- Socially --     --  --/-- --  --    Moderate Amount of Exercise (1-3 times weekly) --  --/-- --  --    No known infection risk --  --/-- --  --    Smokeless tobacco Former 29/46 1-2 --    Tobacco Never --/-- --  --          Immunizations  NameDate Admin Mfg Trade Name Lot Number Route Inj VIS Given VIS Publication   Tdap07/21/2017 SKB BOOSTRIX 594sr IM LD 07/21/2017 01/24/2012   Comments: Tolerated well         Review of Systems  · Constitutional  o Denies  o : fever, fatigue, weight loss, weight gain  · Cardiovascular  o Denies  o : lower extremity edema, claudication, chest pressure, palpitations  · Respiratory  o Denies  o : shortness of breath, wheezing, cough, hemoptysis, dyspnea on  "exertion  · Gastrointestinal  o Denies  o : nausea, vomiting, diarrhea, constipation, abdominal pain      Vitals  Date Time BP Position Site L\R Cuff Size HR RR TEMP (F) WT  HT  BMI kg/m2 BSA m2 O2 Sat        05/04/2020 09:59 /85 Sitting    96 - R   229lbs 2oz 5'  9\" 33.84 2.25 98 %          Physical Examination  · Constitutional  o Appearance  o : overweight, well developed  · Head and Face  o Head  o : normocephalic, atraumatic  · Neck  o Inspection/Palpation  o : normal appearance, no masses or tenderness, trachea midline  o Thyroid  o : gland size normal, nontender, no nodules or masses present on palpation  · Respiratory  o Respiratory Effort  o : breathing unlabored  o Inspection of Chest  o : chest rise symmetric bilaterally  o Auscultation of Lungs  o : clear to auscultation bilaterally throughout inspiration and expiration  · Cardiovascular  o Heart  o :   § Auscultation of Heart  § : regular rate and rhythm, no murmurs, gallops or rubs  o Peripheral Vascular System  o :   § Extremities  § : no edema  · Lymphatic  o Neck  o : no cervical lymphadenopathy, no supraclavicular lymphadenopathy  · Psychiatric  o Mood and Affect  o : mood normal, affect appropriate          Assessment  · Essential hypertension     401.9/I10  · Hyperlipidemia     272.4/E78.5  · Hypothyroidism     244.9/E03.9  · Class 1 obesity due to excess calories with serious comorbidity and body mass index (BMI) of 33.0 to 33.9 in adult       Other obesity due to excess calories     278.00/E66.09  Body mass index (BMI) 33.0-33.9, adult     278.00/Z68.33      Plan  · Orders  o Free T4 (67466) - 244.9/E03.9 - 05/04/2020  o Male Physical Primary Care Panel (CMP, CBC, TSH, Lipid, PSA) [facility] (37776, 72953, 75990, 79249, 01781, ) - 401.9/I10, 272.4/E78.5 - 05/04/2020  o Urinalysis with Reflex Microscopy if abnormal ([facility]) (48934) - 401.9/I10, 272.4/E78.5 - 05/04/2020  o Vitamin D (25-Hydroxy) Level (16953) - 272.4/E78.5 - " 05/04/2020  o STU Report ([facility]AS[facility]) - - 05/04/2020  o ACO-39: Current medications updated and reviewed () - - 05/04/2020  · Medications  o Synthroid 25 mcg oral tablet   SIG: ---   DISP: (0) tablet with 0 refills  Discontinued on 05/04/2020     o Medications have been Reconciled  o Transition of Care or Provider Policy  · Instructions  o Patient advised to monitor blood pressure (B/P) at home and journal readings. Patient informed that a B/P reading at home of more than 130/80 is considered hypertension. For readings greater [id number]/90 or higher patient is advised to follow up in the office with readings for management. Patient advised to limit sodium intake.  o Patient was educated and given low cholesterol diet information.  o Recommended exercise program to assist with cholesterol, weight loss and overall health improvement.  o Advised that cheeses and other sources of dairy fats, animal fats, fast food, and the extras (candy, pastries, pies, doughnuts and cookies) all contain LDL raising nutrients. Advised to increase fruits, vegetables, whole grains, and to monitor portion sizes.   o Take all medications as prescribed/directed.  o Patient instructed/educated on their diet and exercise program.  o Patient was educated/instructed on their diagnosis, treatment and medications prior to discharge from the clinic today.  o Patient counseled to reduce calorie intake.  o Patient was instructed to exercise regularly.  o Discussed Covid-19 precautions including, but not limited to, social distancing, avoid touching your face, and hand washing.   o Pt is taking xyzal and nasalcort per allergist.  · Disposition  o Call or Return if symptoms worsen or persist.  o F/U in 4-6 months  o Care Transition            Electronically Signed by: Leoncio Willis PA-C -Author on May 4, 2020 01:38:43 PM

## 2021-05-13 NOTE — PROGRESS NOTES
Progress Note      Patient Name: Jose Louie   Patient ID: [id number]   Sex: Male   YOB: 1965    Primary Care Provider: Kevin Maldonado MD   Referring Provider: Kevin Maldonado MD    Visit Date: November 6, 2020    Provider: Azam Lloyd MD   Location: [facility]   Location Address: [address] [place] [address], [address]  [place], [place]  [id number]   Location Phone: [phone]          Chief Complaint  · Follow up  · HTN      History Of Present Illness  Jose Louie is a 55 year old /White male who presents for evaluation and treatment of:      HTN- pt reports home BP readings 140s-150s. pt reports some readings at work with BPs 160s. pt reports lightheadedness at higher doses of valsartan. pt denies HAs, CP.     HLD- doing well on statin  hypothyroid- last TSH normal  anxiety- pt reports much better on lexapro  hypogonadism- doing well on testosterone every other week. pt reports sleeping more but think it may be 2/2 season changes. pt overall feels well.   flu- pt had         Past Medical History  Disease Name Date Onset Notes   Allergies --  --    Anxiety --  --    Arthritis --  --    Back Pain --  --    CPAP use counseling 11/27/2017 --    Deafness --  --    Fungal toenail infection 03/04/2019 --    Heel pain --  --    Hemorrhoid --  --    Hyperlipidemia 05/31/2017 --    Hypertension 12/08/2014 --    Hypogonadism (Testicular Failure) 12/08/2014 --    Hypothyroidism --  --    Low testosterone level in male 05/31/2017 --    Night sweats --  --    Panic disorder 12/09/2014 --    Reflux --  --    Sleep apnea 11/27/2017 --          Past Surgical History  Procedure Name Date Notes   Ankle repair 1983,2017 --    Colonoscopy 1/2020 [facility], History of Colon Polyps   EGD (Endoscopy) 2020 [facility]   Vasectomy 1996 --          Medication List  Name Date Started Instructions   atorvastatin 20 mg oral tablet 08/31/2020 take 1 tablet (20 mg) by oral route once daily at bedtime   cpap  " use nightly   Daily Multiple For Men 0.4 mg oral tablet  --    levothyroxine 137 mcg oral tablet 08/31/2020 take 1 tablet (137 mcg) by oral route once daily for 90 days   Lexapro 10 mg oral tablet 08/31/2020 take 1 tablet (10 mg) by oral route once daily for 0 day   Protonix 40 mg oral tablet,delayed release (/EC) 08/31/2020 take 1 tablet by oral route daily   testosterone cypionate 200 mg/mL intramuscular oil 08/31/2020 200 mg weekly for 90 days   valsartan 80 mg oral tablet 08/31/2020 Take 1 tablet by mouth once daily         Allergy List  Allergen Name Date Reaction Notes   NO KNOWN DRUG ALLERGIES --  --  --        Allergies Reconciled  Family Medical History  Disease Name Relative/Age Notes   Stroke Mother/   --    Heart Disease Father/   --    Diabetes, unspecified type  --    No family history of colorectal cancer  --          Social History  Finding Status Start/Stop Quantity Notes   Alcohol Current some day 30/-- Socially --     --  --/-- --  --    Moderate Amount of Exercise (1-3 times weekly) --  --/-- --  --    No known infection risk --  --/-- --  --    Smokeless tobacco Former 29/46 1-2 --    Tobacco Never --/-- --  --          Immunizations  NameDate Admin Mfg Trade Name Lot Number Route Inj VIS Given VIS Publication   Tdap07/21/2017 SKB BOOSTRIX 594sr IM LD 07/21/2017 01/24/2012   Comments: Tolerated well         Review of Systems  · Constitutional  o Denies  o : fever, fatigue, weight loss, weight gain  · Cardiovascular  o Denies  o : lower extremity edema, chest pressure, palpitations  · Respiratory  o Denies  o : shortness of breath, wheezing, frequent cough, dyspnea on exertion  · Gastrointestinal  o Denies  o : nausea, vomiting, diarrhea, constipation, abdominal pain      Vitals  Date Time BP Position Site L\R Cuff Size HR RR TEMP (F) WT  HT  BMI kg/m2 BSA m2 O2 Sat FR L/min FiO2 HC       05/04/2020 09:59 /85 Sitting    96 - R   229lbs 2oz 5'  9\" 33.84 2.25 98 %      07/31/2020 " "02:30 /68 Sitting    85 - R  97.5 230lbs 2oz 5'  9\" 33.98 2.25 96 %      11/06/2020 08:13 /80 Sitting    80 - R  97.6 228lbs 0oz 5'  9\" 33.67 2.24 96 %  21%          Physical Examination  · Constitutional  o Appearance  o : no acute distress, well-nourished  · Head and Face  o Head  o :   § Inspection  § : atraumatic, normocephalic  · Eyes  o Eyes  o : extraocular movements intact, no scleral icterus, no conjunctival injection  · Ears, Nose, Mouth and Throat  o Ears  o :   § External Ears  § : normal  o Nose  o :   § Intranasal Exam  § : nares patent  o Oral Cavity  o :   § Oral Mucosa  § : moist mucous membranes  · Respiratory  o Respiratory Effort  o : breathing comfortably, symmetric chest rise  · Cardiovascular  o Heart  o :   § Auscultation of Heart  § : regular rate  o Peripheral Vascular System  o :   § Extremities  § : no edema  · Neurologic  o Mental Status Examination  o :   § Orientation  § : grossly oriented to person, place and time  o Gait and Station  o :   § Gait Screening  § : normal gait  · Psychiatric  o General  o : normal mood and affect          Assessment  · Hyperlipidemia     272.4/E78.5  labs reviewed. doing well on statin  · Hypertension     401.9/I10  slightly elevated on hmoe readings, but looks good here. pt reports symptoms at higher doses. cont current dose for now. encouraged to bring home BP monitor with him to next appt to coorelate with our BP cuffs.   · Hypogonadism (Testicular Failure)     257.2  testosterone reviewed. cont current dose of every other week for now. recheck labs in 6 months.   · Hypothyroidism     244.9/E03.9  TSH reviewed and normal.   · Anxiety     300.02/F41.1  doing well on Lexapro. cont current regimen.     Problems Reconciled  Plan  · Orders  o ACO-39: Current medications updated and reviewed (1159F, ) - - 11/06/2020  · Medications  o Medications have been Reconciled  o Transition of Care or Provider Policy  · Instructions  o Patient was " educated/instructed on their diagnosis, treatment and medications prior to discharge from the clinic today.  o 25 Minutes spent with patient including greater than 50% in Education/Counseling/Care Coordination.            Electronically Signed by: Azam Lloyd MD -Author on November 6, 2020 09:13:49 AM

## 2021-05-14 VITALS
WEIGHT: 229 LBS | HEART RATE: 107 BPM | SYSTOLIC BLOOD PRESSURE: 158 MMHG | HEIGHT: 69 IN | BODY MASS INDEX: 33.92 KG/M2 | TEMPERATURE: 98 F | DIASTOLIC BLOOD PRESSURE: 76 MMHG | OXYGEN SATURATION: 96 %

## 2021-05-14 VITALS
WEIGHT: 229 LBS | HEART RATE: 91 BPM | HEIGHT: 69 IN | BODY MASS INDEX: 33.92 KG/M2 | OXYGEN SATURATION: 95 % | SYSTOLIC BLOOD PRESSURE: 120 MMHG | DIASTOLIC BLOOD PRESSURE: 72 MMHG | TEMPERATURE: 97.1 F

## 2021-05-14 VITALS
HEART RATE: 80 BPM | HEIGHT: 69 IN | TEMPERATURE: 97.6 F | WEIGHT: 228 LBS | SYSTOLIC BLOOD PRESSURE: 132 MMHG | DIASTOLIC BLOOD PRESSURE: 80 MMHG | BODY MASS INDEX: 33.77 KG/M2 | OXYGEN SATURATION: 96 %

## 2021-05-14 NOTE — PROGRESS NOTES
"   Progress Note      Patient Name: Jose Louie   Patient ID: [id number]   Sex: Male   YOB: 1965    Primary Care Provider: Azam Lloyd MD   Referring Provider: Azam Lloyd MD    Visit Date: January 15, 2021    Provider: Azam Lloyd MD   Location: [facility]   Location Address: [address] [place] [address]  [id number]   Location Phone: [phone]          Chief Complaint  · Blood pressure follow up      History Of Present Illness  Jose Louie is a 55 year old /White male who presents for evaluation and treatment of:      TYLER - pt needs new sleep medicine provider. pt has not seen provider in several years.    HTN- pt with home readings. most commonly higher first thing in the AM. pt thinks may be related to CPAP settings.   HLD- doing well on statin       Allergy List    Allergies Reconciled  Vitals  Date Time BP Position Site L\R Cuff Size HR RR TEMP (F) WT  HT  BMI kg/m2 BSA m2 O2 Sat FR L/min FiO2 HC       05/04/2020 09:59 /85 Sitting    96 - R   229lbs 2oz 5'  9\" 33.84 2.25 98 %      07/31/2020 02:30 /68 Sitting    85 - R  97.5 230lbs 2oz 5'  9\" 33.98 2.25 96 %      11/06/2020 08:13 /80 Sitting    80 - R  97.6 228lbs 0oz 5'  9\" 33.67 2.24 96 %  21%    01/15/2021 08:26 /76 Sitting    107 - R  98 229lbs 0oz 5'  9\" 33.82 2.25 96 %  21%          Physical Examination  · Constitutional  o Appearance  o : no acute distress, well-nourished  · Head and Face  o Head  o :   § Inspection  § : atraumatic, normocephalic  · Eyes  o Eyes  o : extraocular movements intact, no scleral icterus, no conjunctival injection  · Ears, Nose, Mouth and Throat  o Ears  o :   § External Ears  § : normal  o Nose  o :   § Intranasal Exam  § : nares patent  o Oral Cavity  o :   § Oral Mucosa  § : moist mucous membranes  · Respiratory  o Respiratory Effort  o : breathing comfortably, symmetric chest rise  · Cardiovascular  o Heart  o : "   § Auscultation of Heart  § : regular rate  o Peripheral Vascular System  o :   § Extremities  § : no edema  · Neurologic  o Mental Status Examination  o :   § Orientation  § : grossly oriented to person, place and time  o Gait and Station  o :   § Gait Screening  § : normal gait  · Psychiatric  o General  o : normal mood and affect          Assessment  · Screening for depression     V79.0/Z13.89  · Essential hypertension     401.9/I10  elevated morning BPs may be related to inappropriate CPAP settings.   · Hyperlipidemia     272.4/E78.5  cont statin  · TYLER (obstructive sleep apnea)     327.23/G47.33  refer to pulm for further management    Problems Reconciled  Plan  · Orders  o ACO-18: Negative screen for clinical depression using a standardized tool () - V79.0/Z13.89 - 01/15/2021  o ACO-39: Current medications updated and reviewed (, 1159F) - - 01/15/2021  o PULMONARY CONSULTATION (PULMO) - 327.23/G47.33 - 01/15/2021  · Medications  o atorvastatin 20 mg oral tablet   SIG: take 1 tablet (20 mg) by oral route once daily at bedtime   DISP: (90) Tablet with 3 refills  Refilled on 01/15/2021     o levothyroxine 137 mcg oral tablet   SIG: take 1 tablet (137 mcg) by oral route once daily for 90 days   DISP: (90) Tablet with 3 refills  Refilled on 01/15/2021     o Lexapro 10 mg oral tablet   SIG: take 1 tablet (10 mg) by oral route once daily for 0 day   DISP: (90) Tablet with 3 refills  Refilled on 01/15/2021     o Protonix 40 mg oral tablet,delayed release (DR/EC)   SIG: take 1 tablet by oral route daily   DISP: (90) Tablet with 3 refills  Refilled on 01/15/2021     o testosterone cypionate 200 mg/mL intramuscular oil   SI mg weekly for 90 days   DISP: (12) Milliliter with 1 refills  Refilled on 01/15/2021     o valsartan 80 mg oral tablet   SIG: Take 1.5 tablet by mouth once daily   DISP: (135) Tablet with 1 refills  Refilled on 01/15/2021     o Medrol (David) 4 mg oral tablets,dose pack   SIG: take by  oral route as directed per package instructions   DISP: (1) Package with 0 refills  Discontinued on 01/15/2021     o Medications have been Reconciled  o Transition of Care or Provider Policy  · Instructions  o Depression Screen completed and scanned into the EMR under the designated folder within the patient's documents.  o Today's PHQ-9 result is _1__  o Patient was educated/instructed on their diagnosis, treatment and medications prior to discharge from the clinic today.  · Disposition  o f/u in 3 months  o Care Transition  o TAB Sent            Electronically Signed by: Azam Lloyd MD -Author on January 15, 2021 09:22:23 AM

## 2021-05-14 NOTE — PROGRESS NOTES
Progress Note      Patient Name: Jose Louie   Patient ID: [id number]   Sex: Male   YOB: 1965    Primary Care Provider: Azam Lloyd MD   Referring Provider: Azam Lloyd MD    Visit Date: April 19, 2021    Provider: Vielka Mauricio MD   Location: [facility]   Location Address: [address] [place] [address]  [id number]   Location Phone: [phone]          Chief Complaint  · Follow-up  · No concerns      History Of Present Illness  Jose Louie is a 55 year old /White male who presents for evaluation and treatment of:      Chronic issues.    This is the first time I am seeing him and he is establishing care with me    Anxiety  States that he is doing very well on the Lexapro he has a history of panic attacks at first was not sure what they were and he ended up in the ER with 1 and since he has been on the Lexapro this has not happened he would very much like to come off of this medication however is not sure thinks that the panic attacks happened because he was more of an empty [place] and just very stressed at that time    Essential hypertension  States he has lost a significant amount of weight recently he is trying to control blood pressure it is running in the 140s to 150s systolic when he is checking it at work no chest pain  No trouble breathing    Hypogonadism  This is presumably after a trauma he has done well on his current dose of testosterone for a long time    hyperlipidemia  States that he hates taking statins and feels significant cramping with him after he has been on them for very long    Hypothyroidism  Has slowly had to increase his thyroid med over the last few months does think he may be at a stable dose now does feel like he runs hot and his pulse is typically in the 90s now that he has been on this medication    Reviewed recent EGD which showed some irritation however he is doing well on the Protonix       Past Medical  History  Disease Name Date Onset Notes   Allergies --  --    Anxiety --  --    Arthritis --  --    Back Pain --  --    CPAP use counseling 11/27/2017 --    Deafness --  --    Fungal toenail infection 03/04/2019 --    Heel pain --  --    Hemorrhoid --  --    Hyperlipidemia 05/31/2017 --    Hypertension 12/08/2014 --    Hypogonadism (Testicular Failure) 12/08/2014 --    Hypothyroidism --  --    Low testosterone level in male 05/31/2017 --    Night sweats --  --    Panic disorder 12/09/2014 --    Reflux --  --    Sleep apnea 11/27/2017 --          Past Surgical History  Procedure Name Date Notes   Ankle repair 1983,2017 --    Colonoscopy 1/2020 Richie, History of Colon Polyps   EGD (Endoscopy) 2020 Richie   Vasectomy 1996 --          Medication List  Name Date Started Instructions   atorvastatin 20 mg oral tablet 01/27/2021 take 1 tablet (20 mg) by oral route once daily at bedtime   cpap  use nightly   Daily Multiple For Men 0.4 mg oral tablet  --    levothyroxine 137 mcg oral tablet 01/27/2021 take 1 tablet (137 mcg) by oral route once daily for 90 days   Lexapro 10 mg oral tablet 01/27/2021 take 1 tablet (10 mg) by oral route once daily for 0 day   Protonix 40 mg oral tablet,delayed release (/EC) 01/27/2021 take 1 tablet by oral route daily   testosterone cypionate 200 mg/mL intramuscular oil 04/08/2021 200 mg weekly for 90 days   valsartan 80 mg oral tablet 01/27/2021 Take 1.5 tablet by mouth once daily         Allergy List  Allergen Name Date Reaction Notes   NO KNOWN DRUG ALLERGIES --  --  --        Allergies Reconciled  Family Medical History  Disease Name Relative/Age Notes   Stroke Mother/   --    Heart Disease Father/   --    Diabetes, unspecified type  --    No family history of colorectal cancer  --          Social History  Finding Status Start/Stop Quantity Notes   Alcohol Current some day 30/-- Socially --     --  --/-- --  --    Moderate Amount of Exercise (1-3 times weekly) --  --/-- --  --   "  No known infection risk --  --/-- --  --    Smokeless tobacco Former 29/46 1-2 --    Tobacco Never --/-- --  --          Immunizations  NameDate Admin Mfg Trade Name Lot Number Route Inj VIS Given VIS Publication   Tdap07/21/2017 SKB BOOSTRIX 594sr IM LD 07/21/2017 01/24/2012   Comments: Tolerated well         Vitals  Date Time BP Position Site L\R Cuff Size HR RR TEMP (F) WT  HT  BMI kg/m2 BSA m2 O2 Sat FR L/min FiO2 HC       04/19/2021 10:17 /72 Sitting    91 - R  97.1 229lbs 0oz 5'  9\" 33.82 2.25 95 %  21%          Physical Examination  · Constitutional  o Appearance  o : no acute distress, well-nourished  · Head and Face  o Head  o :   § Inspection  § : atraumatic, normocephalic  · Eyes  o Eyes  o : extraocular movements intact, no scleral icterus, no conjunctival injection  · Respiratory  o Respiratory Effort  o : breathing comfortably, symmetric chest rise  o Auscultation of Lungs  o : clear to asculatation bilaterally, no wheezes, rales, or rhonchii  · Cardiovascular  o Heart  o :   § Auscultation of Heart  § : regular rate and rhythm, no murmurs, rubs, or gallops  o Peripheral Vascular System  o :   § Extremities  § : no edema  · Neurologic  o Mental Status Examination  o :   § Orientation  § : grossly oriented to person, place and time  o Gait and Station  o :   § Gait Screening  § : normal gait  · Psychiatric  o General  o : normal mood and affect          Results  · In-Office Procedures  o Lab procedure  § IOP - Urine Drug Screen In-House [facility] (04752)   § Amphetamines Ur Ql: Negative   § Barbiturates Ur Ql: Negative   § Buprenorphine+Nor Ur Ql Scn: Negative   § Benzodiaz Ur Ql: Negative   § Cocaine Ur Ql: Negative   § Methadone Ur Ql: Negative   § Methamphet Ur Ql: Negative   § MDMA Ur Ql Scn: Negative   § Opiates Ur Ql: Negative   § Oxycodone Ur Ql: Negative   § PCP Ur Ql: Negative   § THC Ur Ql: Negative   § Temp in Range?: Within/Acceptable   § Control Seen?: Yes "       Assessment  · Hyperlipidemia     272.4/E78.5  We will have him stop cholesterol medication for now monitor very closely repeat labs in 3 months if cholesterol goes up during that time would consider restarting Livalo  · Hypertension     401.9/I10  Slightly elevated discussed risk and benefit of this he does not wish to change meds at this time and wants to continue his current dietary plan especially as it is good today  However he understands risks and will monitor levels at home  · Low testosterone level in male     257.2/E29.1  Will wait 1 week and check labs when testosterone is likely to be lowest  · Hypothyroidism     244.9/E03.9  Will check labs and adjust meds as needed based on result  · Anxiety     300.02/F41.1  Discussed that we could decrease Lexapro to 5 mg however will just do cholesterol med adjustment at this time and stay on tens for now    Problems Reconciled  Plan  · Orders  o HTN/Lipid Panel (CMP, Lipid) [facility] (82517, 01449) - 272.4/E78.5 - 04/19/2021  o Thyroid Profile (THYII, 35500, 51737) - 244.9/E03.9 - 04/19/2021   needs repeat cholesterol and anything else in 3 months  o ACO-39: Current medications updated and reviewed (1159F, ) - - 04/19/2021  o Testosterone (Total) (55847) - 401.9/I10 - 04/19/2021  o CBC with Auto Diff [facility] (11937) - 401.9/I10, 257.2/E29.1, 244.9/E03.9 - 04/19/2021  o PSA ultrasensitive DIAGNOSTIC [facility] (21189) - 401.9/I10, 257.2/E29.1, 244.9/E03.9 - 04/19/2021  · Medications  o Medications have been Reconciled  o Transition of Care or Provider Policy  · Instructions  o Advised that cheeses and other sources of dairy fats, animal fats, fast food, and the extras (candy, pastries, pies, doughnuts and cookies) all contain LDL raising nutrients. Advised to increase fruits, vegetables, whole grains, and to monitor portion sizes.   o Patient was educated/instructed on their diagnosis, treatment and medications prior to discharge from the clinic today.  · Disposition  o 3  Month Follow Up            Electronically Signed by: Vielka Mauricio MD -Author on April 19, 2021 11:37:37 AM

## 2021-05-15 VITALS
WEIGHT: 230.12 LBS | BODY MASS INDEX: 34.08 KG/M2 | DIASTOLIC BLOOD PRESSURE: 68 MMHG | TEMPERATURE: 97.5 F | SYSTOLIC BLOOD PRESSURE: 134 MMHG | OXYGEN SATURATION: 96 % | HEART RATE: 85 BPM | HEIGHT: 69 IN

## 2021-05-15 VITALS
WEIGHT: 224.12 LBS | SYSTOLIC BLOOD PRESSURE: 139 MMHG | BODY MASS INDEX: 33.2 KG/M2 | HEART RATE: 99 BPM | OXYGEN SATURATION: 98 % | HEIGHT: 69 IN | DIASTOLIC BLOOD PRESSURE: 68 MMHG

## 2021-05-15 VITALS
DIASTOLIC BLOOD PRESSURE: 74 MMHG | WEIGHT: 226.25 LBS | BODY MASS INDEX: 33.51 KG/M2 | HEIGHT: 69 IN | HEART RATE: 118 BPM | OXYGEN SATURATION: 96 % | SYSTOLIC BLOOD PRESSURE: 138 MMHG

## 2021-05-15 VITALS
BODY MASS INDEX: 33.94 KG/M2 | HEART RATE: 96 BPM | SYSTOLIC BLOOD PRESSURE: 142 MMHG | WEIGHT: 229.12 LBS | DIASTOLIC BLOOD PRESSURE: 85 MMHG | OXYGEN SATURATION: 98 % | HEIGHT: 69 IN

## 2021-05-16 VITALS
SYSTOLIC BLOOD PRESSURE: 134 MMHG | WEIGHT: 225 LBS | OXYGEN SATURATION: 97 % | BODY MASS INDEX: 33.33 KG/M2 | HEIGHT: 69 IN | HEART RATE: 88 BPM | DIASTOLIC BLOOD PRESSURE: 55 MMHG

## 2021-05-16 VITALS
HEART RATE: 107 BPM | BODY MASS INDEX: 32.29 KG/M2 | WEIGHT: 218 LBS | DIASTOLIC BLOOD PRESSURE: 65 MMHG | HEIGHT: 69 IN | SYSTOLIC BLOOD PRESSURE: 149 MMHG

## 2021-06-04 ENCOUNTER — HOSPITAL ENCOUNTER (OUTPATIENT)
Dept: LAB | Facility: HOSPITAL | Age: 56
Discharge: HOME | End: 2021-06-04
Attending: INTERNAL MEDICINE

## 2021-06-04 LAB
T4 FREE SERPL-MCNC: 0.9 NG/DL (ref 0.9–1.8)
TSH SERPL-ACNC: 4.38 M[IU]/L (ref 0.27–4.2)

## 2021-06-05 DIAGNOSIS — E03.9 HYPOTHYROIDISM, UNSPECIFIED TYPE: Primary | ICD-10-CM

## 2021-06-10 ENCOUNTER — TELEPHONE (OUTPATIENT)
Dept: INTERNAL MEDICINE | Facility: CLINIC | Age: 56
End: 2021-06-10

## 2021-06-10 NOTE — TELEPHONE ENCOUNTER
PATIENT HAS RECEIVED A LETTER IN MAIL STATING HIS LABS WERE ABNORMAL, PATIENT IS REQUESTING A CALL BACK FROM CLINICAL.     PLEASE CALL AND ADVISE: [phone]

## 2021-06-10 NOTE — TELEPHONE ENCOUNTER
Called and talked with patient and let him know that this letter was from previous labs, we had already talked with him since last abnormal results and repeated his thyroid since then. No other issues or concerns noted currently per patient.

## 2021-07-15 NOTE — TELEPHONE ENCOUNTER
"Sent my chart message to patient.    \"Please allow 48 hours for controlled medications to be sent to the pharmacy.  This is stated in the control substance agreement that was signed on 4/19/2021.\"  "

## 2021-07-16 RX ORDER — TESTOSTERONE CYPIONATE 200 MG/ML
INJECTION, SOLUTION INTRAMUSCULAR
Qty: 4 ML | Refills: 2 | Status: SHIPPED | OUTPATIENT
Start: 2021-07-16 | End: 2021-07-21 | Stop reason: SDUPTHER

## 2021-07-19 ENCOUNTER — PRIOR AUTHORIZATION (OUTPATIENT)
Dept: INTERNAL MEDICINE | Facility: CLINIC | Age: 56
End: 2021-07-19

## 2021-07-19 ENCOUNTER — TRANSCRIBE ORDERS (OUTPATIENT)
Dept: ADMINISTRATIVE | Facility: HOSPITAL | Age: 56
End: 2021-07-19

## 2021-07-19 ENCOUNTER — LAB (OUTPATIENT)
Dept: LAB | Facility: HOSPITAL | Age: 56
End: 2021-07-19

## 2021-07-19 DIAGNOSIS — E78.5 HYPERLIPIDEMIA, UNSPECIFIED HYPERLIPIDEMIA TYPE: Primary | ICD-10-CM

## 2021-07-19 DIAGNOSIS — E03.9 HYPOTHYROIDISM, UNSPECIFIED TYPE: ICD-10-CM

## 2021-07-19 DIAGNOSIS — E78.5 HYPERLIPIDEMIA, UNSPECIFIED HYPERLIPIDEMIA TYPE: ICD-10-CM

## 2021-07-19 LAB
CHOLEST SERPL-MCNC: 238 MG/DL (ref 0–200)
HDLC SERPL-MCNC: 45 MG/DL (ref 40–60)
LDLC SERPL CALC-MCNC: 148 MG/DL (ref 0–100)
LDLC/HDLC SERPL: 3.2 {RATIO}
T4 FREE SERPL-MCNC: 0.89 NG/DL (ref 0.93–1.7)
TRIGL SERPL-MCNC: 246 MG/DL (ref 0–150)
TSH SERPL DL<=0.05 MIU/L-ACNC: 3.03 UIU/ML (ref 0.27–4.2)
VLDLC SERPL-MCNC: 45 MG/DL (ref 5–40)

## 2021-07-19 PROCEDURE — 36415 COLL VENOUS BLD VENIPUNCTURE: CPT

## 2021-07-19 PROCEDURE — 84443 ASSAY THYROID STIM HORMONE: CPT | Performed by: INTERNAL MEDICINE

## 2021-07-19 PROCEDURE — 84439 ASSAY OF FREE THYROXINE: CPT | Performed by: INTERNAL MEDICINE

## 2021-07-19 PROCEDURE — 80061 LIPID PANEL: CPT | Performed by: INTERNAL MEDICINE

## 2021-07-20 ENCOUNTER — PRIOR AUTHORIZATION (OUTPATIENT)
Dept: INTERNAL MEDICINE | Facility: CLINIC | Age: 56
End: 2021-07-20

## 2021-07-20 RX ORDER — TESTOSTERONE CYPIONATE 200 MG/ML
INJECTION, SOLUTION INTRAMUSCULAR
Qty: 4 ML | Refills: 2 | Status: CANCELLED | OUTPATIENT
Start: 2021-07-20

## 2021-07-21 RX ORDER — TESTOSTERONE CYPIONATE 200 MG/ML
INJECTION, SOLUTION INTRAMUSCULAR
Qty: 4 ML | Refills: 2 | Status: SHIPPED | OUTPATIENT
Start: 2021-07-21 | End: 2021-07-30 | Stop reason: SDUPTHER

## 2021-07-29 RX ORDER — COLCHICINE 0.6 MG/1
0.6 TABLET ORAL 2 TIMES DAILY
COMMUNITY
Start: 2021-05-29 | End: 2021-07-29 | Stop reason: SDUPTHER

## 2021-07-29 RX ORDER — COLCHICINE 0.6 MG/1
0.6 TABLET ORAL 2 TIMES DAILY
Qty: 180 TABLET | Refills: 1 | Status: SHIPPED | OUTPATIENT
Start: 2021-07-29 | End: 2021-07-30

## 2021-07-30 ENCOUNTER — OFFICE VISIT (OUTPATIENT)
Dept: INTERNAL MEDICINE | Facility: CLINIC | Age: 56
End: 2021-07-30

## 2021-07-30 VITALS
TEMPERATURE: 98 F | BODY MASS INDEX: 33.24 KG/M2 | OXYGEN SATURATION: 97 % | SYSTOLIC BLOOD PRESSURE: 132 MMHG | WEIGHT: 224.4 LBS | DIASTOLIC BLOOD PRESSURE: 74 MMHG | HEIGHT: 69 IN | HEART RATE: 87 BPM

## 2021-07-30 DIAGNOSIS — M1A.0710 CHRONIC GOUT OF RIGHT ANKLE, UNSPECIFIED CAUSE: Primary | ICD-10-CM

## 2021-07-30 DIAGNOSIS — E29.1 HYPOGONADISM IN MALE: ICD-10-CM

## 2021-07-30 DIAGNOSIS — E78.2 MIXED HYPERLIPIDEMIA: ICD-10-CM

## 2021-07-30 DIAGNOSIS — F41.9 ANXIETY: ICD-10-CM

## 2021-07-30 DIAGNOSIS — E06.3 HYPOTHYROIDISM DUE TO HASHIMOTO'S THYROIDITIS: ICD-10-CM

## 2021-07-30 DIAGNOSIS — Z11.59 NEED FOR HEPATITIS C SCREENING TEST: ICD-10-CM

## 2021-07-30 DIAGNOSIS — E03.8 HYPOTHYROIDISM DUE TO HASHIMOTO'S THYROIDITIS: ICD-10-CM

## 2021-07-30 DIAGNOSIS — I10 ESSENTIAL HYPERTENSION: ICD-10-CM

## 2021-07-30 PROBLEM — E03.9 HYPOTHYROIDISM: Status: ACTIVE | Noted: 2021-07-30

## 2021-07-30 PROBLEM — E78.5 HYPERLIPIDEMIA: Status: ACTIVE | Noted: 2017-05-31

## 2021-07-30 PROBLEM — G47.30 SLEEP APNEA: Status: ACTIVE | Noted: 2017-11-27

## 2021-07-30 LAB
HCV AB SER DONR QL: NORMAL
URATE SERPL-MCNC: 8.2 MG/DL (ref 3.4–7)

## 2021-07-30 PROCEDURE — 84550 ASSAY OF BLOOD/URIC ACID: CPT | Performed by: INTERNAL MEDICINE

## 2021-07-30 PROCEDURE — 86803 HEPATITIS C AB TEST: CPT | Performed by: INTERNAL MEDICINE

## 2021-07-30 PROCEDURE — 99214 OFFICE O/P EST MOD 30 MIN: CPT | Performed by: INTERNAL MEDICINE

## 2021-07-30 RX ORDER — TESTOSTERONE CYPIONATE 200 MG/ML
INJECTION, SOLUTION INTRAMUSCULAR
Qty: 4 ML | Refills: 2 | Status: SHIPPED | OUTPATIENT
Start: 2021-07-30 | End: 2021-09-15 | Stop reason: SDUPTHER

## 2021-07-30 RX ORDER — ESCITALOPRAM OXALATE 5 MG/1
5 TABLET ORAL DAILY
Qty: 90 TABLET | Refills: 0 | Status: SHIPPED | OUTPATIENT
Start: 2021-07-30 | End: 2021-07-30

## 2021-07-30 RX ORDER — ESCITALOPRAM OXALATE 5 MG/1
5 TABLET ORAL DAILY
Qty: 90 TABLET | Refills: 0 | Status: SHIPPED | OUTPATIENT
Start: 2021-07-30 | End: 2021-12-21 | Stop reason: SDUPTHER

## 2021-07-30 RX ORDER — COLCHICINE 0.6 MG/1
0.6 TABLET ORAL 2 TIMES DAILY
Qty: 180 TABLET | Refills: 1 | Status: SHIPPED | OUTPATIENT
Start: 2021-07-30 | End: 2021-09-16

## 2021-07-30 NOTE — ASSESSMENT & PLAN NOTE
Will treat with colchicine, if no improvement consider xray  Uric acid today, based on results will determine if we want to do daily treatment for this

## 2021-07-30 NOTE — PROGRESS NOTES
"Chief Complaint  Hypertension, Hyperlipidemia, discuss lab results, and talk about allopurinol    Subjective          Jose Louie presents to [facility]  History of Present Illness    Ankle pain-  Flared a few days ago  It is in the right ankle  Can't put weight on it at times  No known injury  Slight swelling  Colchicine has helped in the past    Reviewed ASCVD risk  No cramping  Enjoys being off meds and feels well    Hypogonadism-  Feels fine  No chest pain  No trouble breathing    Stress-  Doesn't have panic attacks any longer, but in stressful situations he does start to notice himself \"getting excited\"  Overall does feel like lexapro is helping      Objective   Vital Signs:   /74   Pulse 87   Temp 98 °F (36.7 °C)   Ht 175.3 cm (69\")   Wt 102 kg (224 lb 6.4 oz)   SpO2 97%   BMI 33.14 kg/m²     Physical Exam  Vitals reviewed.   Constitutional:       Appearance: Normal appearance. He is well-developed.   HENT:      Head: Normocephalic and atraumatic.      Right Ear: External ear normal.      Left Ear: External ear normal.      Mouth/Throat:      Pharynx: No oropharyngeal exudate.   Eyes:      Conjunctiva/sclera: Conjunctivae normal.      Pupils: Pupils are equal, round, and reactive to light.   Cardiovascular:      Rate and Rhythm: Normal rate and regular rhythm.      Heart sounds: No murmur heard.   No friction rub. No gallop.    Pulmonary:      Effort: Pulmonary effort is normal.      Breath sounds: Normal breath sounds. No wheezing or rhonchi.   Musculoskeletal:      Comments: Right ankle with tenderness a long anterior and lateral aspect of foot and ankle     Skin:     General: Skin is warm and dry.   Neurological:      Mental Status: He is alert and oriented to person, place, and time.      Cranial Nerves: No cranial nerve deficit.   Psychiatric:         Mood and Affect: Affect normal.         Behavior: Behavior normal.         Thought " Content: Thought content normal.        Result Review :     Common labs    Common Labsle 10/30/20 4/26/21 4/26/21 4/26/21 4/26/21 7/19/21     0740 0740 0740 0740    Glucose 90   85     BUN 12   15     Creatinine 1.08   1.15     Sodium 140   137     Potassium 4.3   4.3     Chloride 104   99     Calcium 9.2   9.4     Albumin 4.3   4.4     Total Bilirubin 0.39   0.36     Alkaline Phosphatase 62   58     AST (SGOT) 43   37     ALT (SGPT) 56 (A)   50 (A)     WBC 6.14 7.44       Hemoglobin 16.6 17.5       Hematocrit 50.6 53.1 (A)       Platelets 214 235       Total Cholesterol      238 (A)   Total Cholesterol 165   238 (A)     Triglycerides 232 (A)   509 (A)  246 (A)   HDL Cholesterol 49   47  45   LDL Cholesterol  70    128 (A) 148 (A)   PSA   1.51      (A) Abnormal value       Comments are available for some flowsheets but are not being displayed.              Procedures      Assessment and Plan    Diagnoses and all orders for this visit:    1. Chronic gout of right ankle, unspecified cause (Primary)  Assessment & Plan:  Will treat with colchicine, if no improvement consider xray  Uric acid today, based on results will determine if we want to do daily treatment for this    Orders:  -     Uric acid; Future  -     Uric acid    2. Need for hepatitis C screening test  -     Hepatitis C Antibody; Future  -     Hepatitis C Antibody    3. Anxiety  Assessment & Plan:  Will decrease lexapro to 5mg and see how he does with this      4. Hypothyroidism due to Hashimoto's thyroiditis  Assessment & Plan:  Stable, cont current meds      5. Mixed hyperlipidemia  Assessment & Plan:  Doing well off meds discussed ascvd, will stay off meds for now      6. Essential hypertension  Assessment & Plan:  Well controlled, cont current meds      Other orders  -     Testosterone Cypionate (DEPOTESTOTERONE CYPIONATE) 200 MG/ML injection; Inject 1 ml into the muscle every week as directed by prescriber.  Dispense: 4 mL; Refill: 2  -     colchicine  0.6 MG tablet; Take 1 tablet by mouth 2 (Two) Times a Day. Take one tablet by oral route twice daily.  Dispense: 180 tablet; Refill: 1  -     Discontinue: escitalopram (Lexapro) 5 MG tablet; Take 1 tablet by mouth Daily.  Dispense: 90 tablet; Refill: 0  -     escitalopram (Lexapro) 5 MG tablet; Take 1 tablet by mouth Daily.  Dispense: 90 tablet; Refill: 0      Follow Up   Return in about 3 months (around 10/30/2021).  Patient was given instructions and counseling regarding his condition or for health maintenance advice. Please see specific information pulled into the AVS if appropriate.

## 2021-09-15 NOTE — TELEPHONE ENCOUNTER
Patient is having gout flares and is wondering if he could start allopurinol to help with this. He is also requesting a refill on his testosterone.    UDS:4/19/21  LOV:7/30/21

## 2021-09-16 RX ORDER — TESTOSTERONE CYPIONATE 200 MG/ML
INJECTION, SOLUTION INTRAMUSCULAR
Qty: 4 ML | Refills: 2 | Status: SHIPPED | OUTPATIENT
Start: 2021-09-16 | End: 2021-09-17 | Stop reason: SDUPTHER

## 2021-09-16 RX ORDER — ALLOPURINOL 100 MG/1
100 TABLET ORAL DAILY
Qty: 90 TABLET | Refills: 0 | Status: SHIPPED | OUTPATIENT
Start: 2021-09-16 | End: 2021-10-26 | Stop reason: SDUPTHER

## 2021-09-16 RX ORDER — TESTOSTERONE CYPIONATE 200 MG/ML
INJECTION, SOLUTION INTRAMUSCULAR
Qty: 4 ML | Refills: 0 | Status: CANCELLED | OUTPATIENT
Start: 2021-09-16

## 2021-09-17 NOTE — TELEPHONE ENCOUNTER
Caller: Jose Louie    Relationship: Self    Best call back number:[phone]    Medication needed:   Requested Prescriptions     Pending Prescriptions Disp Refills   • Testosterone Cypionate (DEPOTESTOTERONE CYPIONATE) 200 MG/ML injection 4 mL 2     Sig: Inject 1 ml into the muscle every week as directed by prescriber.       When do you need the refill by: AS SOON AS POSSIBLE    What additional details did the patient provide when requesting the medication: PATIENT STATES HE ALWAYS HAS AN ISSUE GETTING THIS AT THE [facility], WOULD PREFER TO PICK IT UP AT [facility].     Does the patient have less than a 3 day supply:  [x] Yes  [] No    What is the patient's preferred pharmacy: [id number] - [place], [place] - [address] AT [facility] [phone] [facility] [phone] FX

## 2021-09-20 RX ORDER — TESTOSTERONE CYPIONATE 200 MG/ML
INJECTION, SOLUTION INTRAMUSCULAR
Qty: 4 ML | Refills: 2 | Status: SHIPPED | OUTPATIENT
Start: 2021-09-20 | End: 2021-10-27

## 2021-09-21 RX ORDER — TESTOSTERONE CYPIONATE 200 MG/ML
INJECTION, SOLUTION INTRAMUSCULAR
Qty: 4 ML | Refills: 0 | OUTPATIENT
Start: 2021-09-21

## 2021-10-01 ENCOUNTER — LAB (OUTPATIENT)
Dept: LAB | Facility: HOSPITAL | Age: 56
End: 2021-10-01

## 2021-10-01 DIAGNOSIS — M1A.09X0 CHRONIC GOUT OF MULTIPLE SITES, UNSPECIFIED CAUSE: Primary | ICD-10-CM

## 2021-10-01 DIAGNOSIS — M1A.09X0 CHRONIC GOUT OF MULTIPLE SITES, UNSPECIFIED CAUSE: ICD-10-CM

## 2021-10-01 LAB — URATE SERPL-MCNC: 7.2 MG/DL (ref 3.4–7)

## 2021-10-01 PROCEDURE — 84550 ASSAY OF BLOOD/URIC ACID: CPT

## 2021-10-01 PROCEDURE — 36415 COLL VENOUS BLD VENIPUNCTURE: CPT

## 2021-10-18 ENCOUNTER — DOCUMENTATION (OUTPATIENT)
Dept: INTERNAL MEDICINE | Facility: CLINIC | Age: 56
End: 2021-10-18

## 2021-10-19 DIAGNOSIS — E06.3 HYPOTHYROIDISM DUE TO HASHIMOTO'S THYROIDITIS: ICD-10-CM

## 2021-10-19 DIAGNOSIS — E03.8 HYPOTHYROIDISM DUE TO HASHIMOTO'S THYROIDITIS: ICD-10-CM

## 2021-10-19 DIAGNOSIS — M1A.09X0 CHRONIC GOUT OF MULTIPLE SITES, UNSPECIFIED CAUSE: Primary | ICD-10-CM

## 2021-10-19 DIAGNOSIS — E29.1 HYPOGONADISM IN MALE: ICD-10-CM

## 2021-10-19 DIAGNOSIS — E78.2 MIXED HYPERLIPIDEMIA: ICD-10-CM

## 2021-10-19 DIAGNOSIS — I10 ESSENTIAL HYPERTENSION: ICD-10-CM

## 2021-10-20 ENCOUNTER — LAB (OUTPATIENT)
Dept: LAB | Facility: HOSPITAL | Age: 56
End: 2021-10-20

## 2021-10-20 DIAGNOSIS — I10 ESSENTIAL HYPERTENSION: ICD-10-CM

## 2021-10-20 DIAGNOSIS — F41.9 ANXIETY: ICD-10-CM

## 2021-10-20 DIAGNOSIS — E06.3 HYPOTHYROIDISM DUE TO HASHIMOTO'S THYROIDITIS: ICD-10-CM

## 2021-10-20 DIAGNOSIS — E03.8 HYPOTHYROIDISM DUE TO HASHIMOTO'S THYROIDITIS: ICD-10-CM

## 2021-10-20 DIAGNOSIS — E29.1 HYPOGONADISM IN MALE: ICD-10-CM

## 2021-10-20 LAB
ALBUMIN SERPL-MCNC: 4.4 G/DL (ref 3.5–5.2)
ALBUMIN/GLOB SERPL: 1.8 G/DL
ALP SERPL-CCNC: 51 U/L (ref 39–117)
ALT SERPL W P-5'-P-CCNC: 43 U/L (ref 1–41)
ANION GAP SERPL CALCULATED.3IONS-SCNC: 6.8 MMOL/L (ref 5–15)
AST SERPL-CCNC: 34 U/L (ref 1–40)
BASOPHILS # BLD AUTO: 0.05 10*3/MM3 (ref 0–0.2)
BASOPHILS NFR BLD AUTO: 0.7 % (ref 0–1.5)
BILIRUB SERPL-MCNC: 0.6 MG/DL (ref 0–1.2)
BUN SERPL-MCNC: 17 MG/DL (ref 6–20)
BUN/CREAT SERPL: 12.9 (ref 7–25)
CALCIUM SPEC-SCNC: 9.1 MG/DL (ref 8.6–10.5)
CHLORIDE SERPL-SCNC: 101 MMOL/L (ref 98–107)
CHOLEST SERPL-MCNC: 236 MG/DL (ref 0–200)
CO2 SERPL-SCNC: 27.2 MMOL/L (ref 22–29)
CREAT SERPL-MCNC: 1.32 MG/DL (ref 0.76–1.27)
DEPRECATED RDW RBC AUTO: 47.2 FL (ref 37–54)
EOSINOPHIL # BLD AUTO: 0.28 10*3/MM3 (ref 0–0.4)
EOSINOPHIL NFR BLD AUTO: 3.9 % (ref 0.3–6.2)
ERYTHROCYTE [DISTWIDTH] IN BLOOD BY AUTOMATED COUNT: 13 % (ref 12.3–15.4)
GFR SERPL CREATININE-BSD FRML MDRD: 56 ML/MIN/1.73
GLOBULIN UR ELPH-MCNC: 2.5 GM/DL
GLUCOSE SERPL-MCNC: 87 MG/DL (ref 65–99)
HCT VFR BLD AUTO: 51.9 % (ref 37.5–51)
HDLC SERPL-MCNC: 43 MG/DL (ref 40–60)
HGB BLD-MCNC: 17.1 G/DL (ref 13–17.7)
IMM GRANULOCYTES # BLD AUTO: 0.05 10*3/MM3 (ref 0–0.05)
IMM GRANULOCYTES NFR BLD AUTO: 0.7 % (ref 0–0.5)
LDLC SERPL CALC-MCNC: 155 MG/DL (ref 0–100)
LDLC/HDLC SERPL: 3.52 {RATIO}
LYMPHOCYTES # BLD AUTO: 1.55 10*3/MM3 (ref 0.7–3.1)
LYMPHOCYTES NFR BLD AUTO: 21.9 % (ref 19.6–45.3)
MCH RBC QN AUTO: 32.2 PG (ref 26.6–33)
MCHC RBC AUTO-ENTMCNC: 32.9 G/DL (ref 31.5–35.7)
MCV RBC AUTO: 97.7 FL (ref 79–97)
MONOCYTES # BLD AUTO: 0.82 10*3/MM3 (ref 0.1–0.9)
MONOCYTES NFR BLD AUTO: 11.6 % (ref 5–12)
NEUTROPHILS NFR BLD AUTO: 4.34 10*3/MM3 (ref 1.7–7)
NEUTROPHILS NFR BLD AUTO: 61.2 % (ref 42.7–76)
NRBC BLD AUTO-RTO: 0 /100 WBC (ref 0–0.2)
PLATELET # BLD AUTO: 221 10*3/MM3 (ref 140–450)
PMV BLD AUTO: 11.7 FL (ref 6–12)
POTASSIUM SERPL-SCNC: 4.7 MMOL/L (ref 3.5–5.2)
PROT SERPL-MCNC: 6.9 G/DL (ref 6–8.5)
RBC # BLD AUTO: 5.31 10*6/MM3 (ref 4.14–5.8)
SODIUM SERPL-SCNC: 135 MMOL/L (ref 136–145)
T4 FREE SERPL-MCNC: 0.8 NG/DL (ref 0.93–1.7)
TRIGL SERPL-MCNC: 208 MG/DL (ref 0–150)
TSH SERPL DL<=0.05 MIU/L-ACNC: 3.05 UIU/ML (ref 0.27–4.2)
VLDLC SERPL-MCNC: 38 MG/DL (ref 5–40)
WBC # BLD AUTO: 7.09 10*3/MM3 (ref 3.4–10.8)

## 2021-10-20 PROCEDURE — 84443 ASSAY THYROID STIM HORMONE: CPT

## 2021-10-20 PROCEDURE — 85025 COMPLETE CBC W/AUTO DIFF WBC: CPT

## 2021-10-20 PROCEDURE — 36415 COLL VENOUS BLD VENIPUNCTURE: CPT

## 2021-10-20 PROCEDURE — 80053 COMPREHEN METABOLIC PANEL: CPT

## 2021-10-20 PROCEDURE — 80061 LIPID PANEL: CPT

## 2021-10-20 PROCEDURE — 84439 ASSAY OF FREE THYROXINE: CPT

## 2021-10-20 PROCEDURE — 84403 ASSAY OF TOTAL TESTOSTERONE: CPT

## 2021-10-20 PROCEDURE — 84402 ASSAY OF FREE TESTOSTERONE: CPT

## 2021-10-21 ENCOUNTER — TELEPHONE (OUTPATIENT)
Dept: INTERNAL MEDICINE | Facility: CLINIC | Age: 56
End: 2021-10-21

## 2021-10-21 NOTE — TELEPHONE ENCOUNTER
Caller: Jose Louie    Relationship: Self      Medication requested (name and dosage):   Testosterone Cypionate (DEPOTESTOTERONE CYPIONATE) 200 MG/ML injection          levothyroxine (SYNTHROID, LEVOTHROID) 150 MCG tablet     escitalopram (Lexapro) 5 MG tablet    VALSARTAN 80 MG       Pharmacy where request should be sent: [facility]     Additional details provided by patient: PATIENT STATES HE WILL RUN OUT OF LEVOTHYROXINE BEFORE APPOINTMENT ON 9/29 SO HE JUST WANTED TO GO AHEAD AND CALL IN REFILLS ON WHAT HE NEEDED      Best call back number: [phone]     Does the patient have less than a 3 day supply:  [x] Yes  [] No

## 2021-10-23 LAB
TESTOST FREE SERPL-MCNC: 16.8 PG/ML (ref 7.2–24)
TESTOST SERPL-MCNC: 566 NG/DL (ref 264–916)

## 2021-10-26 RX ORDER — COLCHICINE 0.6 MG/1
0.6 TABLET ORAL 2 TIMES DAILY
Qty: 180 TABLET | Refills: 1 | Status: SHIPPED | OUTPATIENT
Start: 2021-10-26 | End: 2021-12-21 | Stop reason: SDUPTHER

## 2021-10-26 RX ORDER — ALLOPURINOL 100 MG/1
100 TABLET ORAL DAILY
Qty: 90 TABLET | Refills: 3 | Status: SHIPPED | OUTPATIENT
Start: 2021-10-26 | End: 2021-12-21 | Stop reason: SDUPTHER

## 2021-10-26 RX ORDER — VALSARTAN 80 MG/1
80 TABLET ORAL DAILY
Qty: 90 TABLET | Refills: 1 | Status: SHIPPED | OUTPATIENT
Start: 2021-10-26 | End: 2021-12-21 | Stop reason: SDUPTHER

## 2021-10-27 RX ORDER — KETOCONAZOLE 20 MG/ML
SHAMPOO TOPICAL
COMMUNITY
Start: 2021-08-17 | End: 2021-12-21 | Stop reason: SDUPTHER

## 2021-10-29 ENCOUNTER — OFFICE VISIT (OUTPATIENT)
Dept: INTERNAL MEDICINE | Facility: CLINIC | Age: 56
End: 2021-10-29

## 2021-10-29 VITALS
RESPIRATION RATE: 14 BRPM | HEART RATE: 89 BPM | HEIGHT: 69 IN | WEIGHT: 221 LBS | TEMPERATURE: 98 F | SYSTOLIC BLOOD PRESSURE: 122 MMHG | OXYGEN SATURATION: 98 % | BODY MASS INDEX: 32.73 KG/M2 | DIASTOLIC BLOOD PRESSURE: 88 MMHG

## 2021-10-29 DIAGNOSIS — E06.3 HYPOTHYROIDISM DUE TO HASHIMOTO'S THYROIDITIS: ICD-10-CM

## 2021-10-29 DIAGNOSIS — E29.1 HYPOGONADISM IN MALE: ICD-10-CM

## 2021-10-29 DIAGNOSIS — I10 PRIMARY HYPERTENSION: ICD-10-CM

## 2021-10-29 DIAGNOSIS — F41.9 ANXIETY: ICD-10-CM

## 2021-10-29 DIAGNOSIS — E78.2 MIXED HYPERLIPIDEMIA: Primary | ICD-10-CM

## 2021-10-29 DIAGNOSIS — E03.8 HYPOTHYROIDISM DUE TO HASHIMOTO'S THYROIDITIS: ICD-10-CM

## 2021-10-29 PROCEDURE — 90686 IIV4 VACC NO PRSV 0.5 ML IM: CPT | Performed by: INTERNAL MEDICINE

## 2021-10-29 PROCEDURE — 90471 IMMUNIZATION ADMIN: CPT | Performed by: INTERNAL MEDICINE

## 2021-10-29 PROCEDURE — 99214 OFFICE O/P EST MOD 30 MIN: CPT | Performed by: INTERNAL MEDICINE

## 2021-12-22 RX ORDER — COLCHICINE 0.6 MG/1
0.6 TABLET ORAL 2 TIMES DAILY
Qty: 180 TABLET | Refills: 1 | Status: SHIPPED | OUTPATIENT
Start: 2021-12-22 | End: 2022-01-03 | Stop reason: SDUPTHER

## 2021-12-22 RX ORDER — PANTOPRAZOLE SODIUM 40 MG/1
TABLET, DELAYED RELEASE ORAL
Qty: 270 TABLET | Refills: 2 | Status: SHIPPED | OUTPATIENT
Start: 2021-12-22 | End: 2022-01-03 | Stop reason: SDUPTHER

## 2021-12-22 RX ORDER — ALLOPURINOL 100 MG/1
100 TABLET ORAL DAILY
Qty: 90 TABLET | Refills: 3 | Status: SHIPPED | OUTPATIENT
Start: 2021-12-22 | End: 2022-01-03 | Stop reason: SDUPTHER

## 2021-12-22 RX ORDER — ESCITALOPRAM OXALATE 5 MG/1
5 TABLET ORAL DAILY
Qty: 90 TABLET | Refills: 0 | Status: SHIPPED | OUTPATIENT
Start: 2021-12-22 | End: 2021-12-23 | Stop reason: SDUPTHER

## 2021-12-22 RX ORDER — ATORVASTATIN CALCIUM 20 MG/1
TABLET, FILM COATED ORAL
Qty: 90 TABLET | Refills: 1 | Status: SHIPPED | OUTPATIENT
Start: 2021-12-22 | End: 2022-01-03 | Stop reason: SDUPTHER

## 2021-12-22 RX ORDER — TESTOSTERONE CYPIONATE 200 MG/ML
200 INJECTION, SOLUTION INTRAMUSCULAR
Qty: 4 ML | Refills: 2 | Status: SHIPPED | OUTPATIENT
Start: 2021-12-22 | End: 2022-01-21 | Stop reason: SDUPTHER

## 2021-12-22 RX ORDER — VALSARTAN 80 MG/1
80 TABLET ORAL DAILY
Qty: 90 TABLET | Refills: 1 | Status: SHIPPED | OUTPATIENT
Start: 2021-12-22 | End: 2022-01-03 | Stop reason: SDUPTHER

## 2021-12-22 RX ORDER — KETOCONAZOLE 20 MG/ML
SHAMPOO TOPICAL 2 TIMES WEEKLY
Qty: 120 ML | Refills: 3 | Status: SHIPPED | OUTPATIENT
Start: 2021-12-23 | End: 2022-01-03 | Stop reason: SDUPTHER

## 2021-12-22 RX ORDER — LEVOTHYROXINE SODIUM 0.15 MG/1
150 TABLET ORAL DAILY
Qty: 90 TABLET | Refills: 3 | Status: SHIPPED | OUTPATIENT
Start: 2021-12-22 | End: 2022-01-03 | Stop reason: SDUPTHER

## 2021-12-23 RX ORDER — ESCITALOPRAM OXALATE 5 MG/1
5 TABLET ORAL DAILY
Qty: 90 TABLET | Refills: 0 | Status: SHIPPED | OUTPATIENT
Start: 2021-12-23 | End: 2022-01-03 | Stop reason: SDUPTHER

## 2022-01-03 RX ORDER — ALLOPURINOL 100 MG/1
100 TABLET ORAL DAILY
Qty: 90 TABLET | Refills: 3 | Status: SHIPPED | OUTPATIENT
Start: 2022-01-03 | End: 2023-03-07 | Stop reason: SDUPTHER

## 2022-01-03 RX ORDER — TESTOSTERONE CYPIONATE 200 MG/ML
200 INJECTION, SOLUTION INTRAMUSCULAR
Qty: 4 ML | Refills: 2 | Status: CANCELLED | OUTPATIENT
Start: 2022-01-03

## 2022-01-03 RX ORDER — ATORVASTATIN CALCIUM 20 MG/1
TABLET, FILM COATED ORAL
Qty: 90 TABLET | Refills: 1 | Status: SHIPPED | OUTPATIENT
Start: 2022-01-03 | End: 2022-04-29

## 2022-01-03 RX ORDER — COLCHICINE 0.6 MG/1
0.6 TABLET ORAL 2 TIMES DAILY
Qty: 180 TABLET | Refills: 1 | Status: SHIPPED | OUTPATIENT
Start: 2022-01-03 | End: 2022-04-29

## 2022-01-03 RX ORDER — VALSARTAN 80 MG/1
80 TABLET ORAL DAILY
Qty: 90 TABLET | Refills: 1 | Status: SHIPPED | OUTPATIENT
Start: 2022-01-03 | End: 2022-01-21

## 2022-01-03 RX ORDER — PANTOPRAZOLE SODIUM 40 MG/1
TABLET, DELAYED RELEASE ORAL
Qty: 270 TABLET | Refills: 2 | Status: SHIPPED | OUTPATIENT
Start: 2022-01-03 | End: 2023-03-06 | Stop reason: SDUPTHER

## 2022-01-03 RX ORDER — LEVOTHYROXINE SODIUM 0.15 MG/1
150 TABLET ORAL DAILY
Qty: 90 TABLET | Refills: 3 | Status: SHIPPED | OUTPATIENT
Start: 2022-01-03 | End: 2022-12-09 | Stop reason: SDUPTHER

## 2022-01-03 RX ORDER — KETOCONAZOLE 20 MG/ML
SHAMPOO TOPICAL 2 TIMES WEEKLY
Qty: 120 ML | Refills: 3 | Status: SHIPPED | OUTPATIENT
Start: 2022-01-03 | End: 2022-11-04

## 2022-01-03 RX ORDER — ESCITALOPRAM OXALATE 5 MG/1
5 TABLET ORAL DAILY
Qty: 90 TABLET | Refills: 0 | Status: SHIPPED | OUTPATIENT
Start: 2022-01-03 | End: 2022-01-21 | Stop reason: SDUPTHER

## 2022-01-03 NOTE — TELEPHONE ENCOUNTER
Pt requesting medication be sent to [facility] pharmacy instead so insurance will pay for it. Called and cancelled rx at [facility].

## 2022-01-21 ENCOUNTER — OFFICE VISIT (OUTPATIENT)
Dept: INTERNAL MEDICINE | Facility: CLINIC | Age: 57
End: 2022-01-21

## 2022-01-21 ENCOUNTER — CLINICAL SUPPORT (OUTPATIENT)
Dept: INTERNAL MEDICINE | Facility: CLINIC | Age: 57
End: 2022-01-21

## 2022-01-21 VITALS
WEIGHT: 218 LBS | OXYGEN SATURATION: 96 % | SYSTOLIC BLOOD PRESSURE: 144 MMHG | HEART RATE: 108 BPM | BODY MASS INDEX: 32.29 KG/M2 | DIASTOLIC BLOOD PRESSURE: 74 MMHG | TEMPERATURE: 98.1 F | HEIGHT: 69 IN | RESPIRATION RATE: 18 BRPM

## 2022-01-21 DIAGNOSIS — E03.8 HYPOTHYROIDISM DUE TO HASHIMOTO'S THYROIDITIS: Primary | ICD-10-CM

## 2022-01-21 DIAGNOSIS — Z51.81 MEDICATION MONITORING ENCOUNTER: ICD-10-CM

## 2022-01-21 DIAGNOSIS — E29.1 HYPOGONADISM IN MALE: ICD-10-CM

## 2022-01-21 DIAGNOSIS — I10 PRIMARY HYPERTENSION: ICD-10-CM

## 2022-01-21 DIAGNOSIS — M1A.0710 CHRONIC GOUT OF RIGHT ANKLE, UNSPECIFIED CAUSE: ICD-10-CM

## 2022-01-21 DIAGNOSIS — E78.2 MIXED HYPERLIPIDEMIA: ICD-10-CM

## 2022-01-21 DIAGNOSIS — F41.9 ANXIETY: ICD-10-CM

## 2022-01-21 DIAGNOSIS — E06.3 HYPOTHYROIDISM DUE TO HASHIMOTO'S THYROIDITIS: Primary | ICD-10-CM

## 2022-01-21 LAB
ALBUMIN SERPL-MCNC: 4.5 G/DL (ref 3.5–5.2)
ALBUMIN/GLOB SERPL: 1.5 G/DL
ALP SERPL-CCNC: 60 U/L (ref 39–117)
ALT SERPL W P-5'-P-CCNC: 41 U/L (ref 1–41)
AMPHET+METHAMPHET UR QL: NEGATIVE
AMPHETAMINE INTERNAL CONTROL: NORMAL
AMPHETAMINES UR QL: NEGATIVE
ANION GAP SERPL CALCULATED.3IONS-SCNC: 9.4 MMOL/L (ref 5–15)
AST SERPL-CCNC: 40 U/L (ref 1–40)
BARBITURATE INTERNAL CONTROL: NORMAL
BARBITURATES UR QL SCN: NEGATIVE
BASOPHILS # BLD AUTO: 0.04 10*3/MM3 (ref 0–0.2)
BASOPHILS NFR BLD AUTO: 0.5 % (ref 0–1.5)
BENZODIAZ UR QL SCN: NEGATIVE
BENZODIAZEPINE INTERNAL CONTROL: NORMAL
BILIRUB SERPL-MCNC: 0.4 MG/DL (ref 0–1.2)
BUN SERPL-MCNC: 13 MG/DL (ref 6–20)
BUN/CREAT SERPL: 11.6 (ref 7–25)
BUPRENORPHINE INTERNAL CONTROL: NORMAL
BUPRENORPHINE SERPL-MCNC: NEGATIVE NG/ML
CALCIUM SPEC-SCNC: 9.9 MG/DL (ref 8.6–10.5)
CANNABINOIDS SERPL QL: NEGATIVE
CHLORIDE SERPL-SCNC: 100 MMOL/L (ref 98–107)
CHOLEST SERPL-MCNC: 263 MG/DL (ref 0–200)
CO2 SERPL-SCNC: 27.6 MMOL/L (ref 22–29)
COCAINE INTERNAL CONTROL: NORMAL
COCAINE UR QL: NEGATIVE
CREAT SERPL-MCNC: 1.12 MG/DL (ref 0.76–1.27)
DEPRECATED RDW RBC AUTO: 45.7 FL (ref 37–54)
EOSINOPHIL # BLD AUTO: 0.18 10*3/MM3 (ref 0–0.4)
EOSINOPHIL NFR BLD AUTO: 2.4 % (ref 0.3–6.2)
ERYTHROCYTE [DISTWIDTH] IN BLOOD BY AUTOMATED COUNT: 12.8 % (ref 12.3–15.4)
EXPIRATION DATE: NORMAL
GFR SERPL CREATININE-BSD FRML MDRD: 68 ML/MIN/1.73
GLOBULIN UR ELPH-MCNC: 3 GM/DL
GLUCOSE SERPL-MCNC: 89 MG/DL (ref 65–99)
HCT VFR BLD AUTO: 54.4 % (ref 37.5–51)
HDLC SERPL-MCNC: 49 MG/DL (ref 40–60)
HGB BLD-MCNC: 18.2 G/DL (ref 13–17.7)
IMM GRANULOCYTES # BLD AUTO: 0.05 10*3/MM3 (ref 0–0.05)
IMM GRANULOCYTES NFR BLD AUTO: 0.7 % (ref 0–0.5)
LDLC SERPL CALC-MCNC: 179 MG/DL (ref 0–100)
LDLC/HDLC SERPL: 3.6 {RATIO}
LYMPHOCYTES # BLD AUTO: 1.1 10*3/MM3 (ref 0.7–3.1)
LYMPHOCYTES NFR BLD AUTO: 14.7 % (ref 19.6–45.3)
Lab: NORMAL
MCH RBC QN AUTO: 32.3 PG (ref 26.6–33)
MCHC RBC AUTO-ENTMCNC: 33.5 G/DL (ref 31.5–35.7)
MCV RBC AUTO: 96.6 FL (ref 79–97)
MDMA (ECSTASY) INTERNAL CONTROL: NORMAL
MDMA UR QL SCN: NEGATIVE
METHADONE INTERNAL CONTROL: NORMAL
METHADONE UR QL SCN: NEGATIVE
METHAMPHETAMINE INTERNAL CONTROL: NORMAL
MONOCYTES # BLD AUTO: 0.59 10*3/MM3 (ref 0.1–0.9)
MONOCYTES NFR BLD AUTO: 7.9 % (ref 5–12)
NEUTROPHILS NFR BLD AUTO: 5.52 10*3/MM3 (ref 1.7–7)
NEUTROPHILS NFR BLD AUTO: 73.8 % (ref 42.7–76)
NRBC BLD AUTO-RTO: 0 /100 WBC (ref 0–0.2)
OPIATES INTERNAL CONTROL: NORMAL
OPIATES UR QL: NEGATIVE
OXYCODONE INTERNAL CONTROL: NORMAL
OXYCODONE UR QL SCN: NEGATIVE
PCP UR QL SCN: NEGATIVE
PHENCYCLIDINE INTERNAL CONTROL: NORMAL
PLATELET # BLD AUTO: 219 10*3/MM3 (ref 140–450)
PMV BLD AUTO: 12 FL (ref 6–12)
POTASSIUM SERPL-SCNC: 4.9 MMOL/L (ref 3.5–5.2)
PROT SERPL-MCNC: 7.5 G/DL (ref 6–8.5)
RBC # BLD AUTO: 5.63 10*6/MM3 (ref 4.14–5.8)
SODIUM SERPL-SCNC: 137 MMOL/L (ref 136–145)
T4 FREE SERPL-MCNC: 1.14 NG/DL (ref 0.93–1.7)
THC INTERNAL CONTROL: NORMAL
TRIGL SERPL-MCNC: 189 MG/DL (ref 0–150)
TSH SERPL DL<=0.05 MIU/L-ACNC: 4.39 UIU/ML (ref 0.27–4.2)
URATE SERPL-MCNC: 7.2 MG/DL (ref 3.4–7)
VLDLC SERPL-MCNC: 35 MG/DL (ref 5–40)
WBC NRBC COR # BLD: 7.48 10*3/MM3 (ref 3.4–10.8)

## 2022-01-21 PROCEDURE — 84550 ASSAY OF BLOOD/URIC ACID: CPT | Performed by: INTERNAL MEDICINE

## 2022-01-21 PROCEDURE — 99214 OFFICE O/P EST MOD 30 MIN: CPT | Performed by: INTERNAL MEDICINE

## 2022-01-21 PROCEDURE — 84439 ASSAY OF FREE THYROXINE: CPT | Performed by: INTERNAL MEDICINE

## 2022-01-21 PROCEDURE — 80305 DRUG TEST PRSMV DIR OPT OBS: CPT | Performed by: INTERNAL MEDICINE

## 2022-01-21 PROCEDURE — 80050 GENERAL HEALTH PANEL: CPT | Performed by: INTERNAL MEDICINE

## 2022-01-21 PROCEDURE — 80061 LIPID PANEL: CPT | Performed by: INTERNAL MEDICINE

## 2022-01-21 PROCEDURE — 84402 ASSAY OF FREE TESTOSTERONE: CPT | Performed by: INTERNAL MEDICINE

## 2022-01-21 PROCEDURE — 36415 COLL VENOUS BLD VENIPUNCTURE: CPT | Performed by: INTERNAL MEDICINE

## 2022-01-21 PROCEDURE — 84403 ASSAY OF TOTAL TESTOSTERONE: CPT | Performed by: INTERNAL MEDICINE

## 2022-01-21 RX ORDER — ESCITALOPRAM OXALATE 5 MG/1
5 TABLET ORAL DAILY
Qty: 90 TABLET | Refills: 0 | Status: SHIPPED | OUTPATIENT
Start: 2022-01-21 | End: 2022-07-14 | Stop reason: SDUPTHER

## 2022-01-21 RX ORDER — TESTOSTERONE CYPIONATE 200 MG/ML
200 INJECTION, SOLUTION INTRAMUSCULAR
Qty: 4 ML | Refills: 2 | Status: SHIPPED | OUTPATIENT
Start: 2022-01-21 | End: 2022-06-03 | Stop reason: SDUPTHER

## 2022-01-21 RX ORDER — OLMESARTAN MEDOXOMIL 40 MG/1
40 TABLET ORAL DAILY
Qty: 90 TABLET | Refills: 1 | Status: SHIPPED | OUTPATIENT
Start: 2022-01-21 | End: 2022-07-14 | Stop reason: SDUPTHER

## 2022-01-21 NOTE — PROGRESS NOTES
"Chief Complaint  Follow up for testosterone and thyroid    Subjective          Jose Louie presents to [facility]  History of Present Illness    HTN-  States that it has been around the 140s  No chest pain  No trouble breathing    Has been very active exercising at least 3 days a week    Gout-  Hasn't needed the colchicine since being on the allopurinol    Had one day of foot pain it is now resolved    Left shoulder was moving a safe  Uses a revived pain patch with lidocaine  Pain was therefore 3 days and gone    Does notice a high heart rate but it does not bother him thinks that it could perhaps be his thyroid medicine however overall feels like the thyroid medicine is where it needs to be    Feels like the testosterone is doing well for him no major sexual dysfunction issues although did notice slight lengthening of time to ejaculation once he started the Lexapro    Anxiety-  Well-controlled on current dose of Lexapro    Objective   Vital Signs:   /74 (BP Location: Left arm, Patient Position: Sitting, Cuff Size: Adult)   Pulse 108   Temp 98.1 °F (36.7 °C)   Resp 18   Ht 175.3 cm (69\")   Wt 98.9 kg (218 lb)   SpO2 96%   BMI 32.19 kg/m²     Physical Exam  Vitals reviewed.   Constitutional:       Appearance: Normal appearance. He is well-developed.   HENT:      Head: Normocephalic and atraumatic.      Right Ear: External ear normal.      Left Ear: External ear normal.   Eyes:      Conjunctiva/sclera: Conjunctivae normal.      Pupils: Pupils are equal, round, and reactive to light.   Cardiovascular:      Rate and Rhythm: Normal rate and regular rhythm.      Heart sounds: No murmur heard.  No friction rub. No gallop.    Pulmonary:      Effort: Pulmonary effort is normal.      Breath sounds: Normal breath sounds. No wheezing or rhonchi.   Skin:     General: Skin is warm and dry.   Neurological:      Mental Status: He is alert and oriented to " person, place, and time.      Cranial Nerves: No cranial nerve deficit.   Psychiatric:         Mood and Affect: Affect normal.         Behavior: Behavior normal.         Thought Content: Thought content normal.        Result Review :     Common labs    Common Labsle 7/30/21 10/1/21 10/20/21 10/20/21 10/20/21      0753 0753 0753   Glucose     87   BUN     17   Creatinine     1.32 (A)   eGFR Non African Am     56 (A)   Sodium     135 (A)   Potassium     4.7   Chloride     101   Calcium     9.1   Albumin     4.40   Total Bilirubin     0.6   Alkaline Phosphatase     51   AST (SGOT)     34   ALT (SGPT)     43 (A)   WBC   7.09     Hemoglobin   17.1     Hematocrit   51.9 (A)     Platelets   221     Total Cholesterol    236 (A)    Triglycerides    208 (A)    HDL Cholesterol    43    LDL Cholesterol     155 (A)    Uric Acid 8.2 (A) 7.2 (A)      (A) Abnormal value               Procedures      Assessment and Plan    Diagnoses and all orders for this visit:    1. Hypothyroidism due to Hashimoto's thyroiditis (Primary)  Comments:  will check labs and adjust as needed  Orders:  -     TSH  -     T4, Free    2. Mixed hyperlipidemia  Comments:  has been high cont to montior with diet and exercise; tolerating statin well  Orders:  -     Lipid Panel    3. Primary hypertension  Comments:  elevated, will try switching to olmesartan; will also consider hctz vs amlodipine  Orders:  -     Comprehensive Metabolic Panel  -     CBC & Differential    4. Hypogonadism in male  Comments:  doing well on current dose, check labs and adjust as needed  Orders:  -     Testosterone, Free, Total  -     Testosterone Cypionate (DEPOTESTOTERONE CYPIONATE) 200 MG/ML injection; Inject 1 mL into the appropriate muscle as directed by prescriber Every 7 (Seven) Days.  Dispense: 4 mL; Refill: 2    5. Chronic gout of right ankle, unspecified cause  Comments:  well controlled, cont current meds  Orders:  -     Uric Acid    6. Medication monitoring encounter  -      POC Urine Drug Screen Premier Bio-Cup    Other orders  -     escitalopram (Lexapro) 5 MG tablet; Take 1 tablet by mouth Daily.  Dispense: 90 tablet; Refill: 0  -     olmesartan (Benicar) 40 MG tablet; Take 1 tablet by mouth Daily.  Dispense: 90 tablet; Refill: 1              Follow Up   Return in about 3 months (around 4/21/2022).  Patient was given instructions and counseling regarding his condition or for health maintenance advice. Please see specific information pulled into the AVS if appropriate.

## 2022-01-23 DIAGNOSIS — E78.00 ELEVATED LDL CHOLESTEROL LEVEL: Primary | ICD-10-CM

## 2022-01-23 DIAGNOSIS — E29.1 HYPOGONADISM IN MALE: ICD-10-CM

## 2022-01-23 DIAGNOSIS — D58.2 ELEVATED HEMOGLOBIN: ICD-10-CM

## 2022-01-23 LAB
TESTOST FREE SERPL-MCNC: 43.7 PG/ML (ref 7.2–24)
TESTOST SERPL-MCNC: 1192 NG/DL (ref 264–916)

## 2022-01-26 ENCOUNTER — TELEPHONE (OUTPATIENT)
Dept: INTERNAL MEDICINE | Facility: CLINIC | Age: 57
End: 2022-01-26

## 2022-01-26 NOTE — TELEPHONE ENCOUNTER
Caller: [facility] Brian [facility]NIGHT    Relationship to patient: Pharmacy    Best call back number: [phone]    Patient is needing: [facility] HAS CALLED TO INFORM THAT THE PATIENT'S PRESCRIPTION Testosterone Cypionate (DEPOTESTOTERONE CYPIONATE) 200 MG/ML injection  IS NEEDING A PRIOR AUTHORIZATION.

## 2022-01-27 ENCOUNTER — PRIOR AUTHORIZATION (OUTPATIENT)
Dept: INTERNAL MEDICINE | Facility: CLINIC | Age: 57
End: 2022-01-27

## 2022-01-27 NOTE — TELEPHONE ENCOUNTER
PA for Testosterone Cypionate 200MG/ML intramuscular solution started on 1/25/22     Pending review

## 2022-01-31 NOTE — TELEPHONE ENCOUNTER
PA Case: [id number], Status: Approved, Coverage Starts on: 1/31/2022 12:00:00 AM, Coverage Ends on: 1/31/2023 12:00:00 AM.

## 2022-02-21 ENCOUNTER — TELEPHONE (OUTPATIENT)
Dept: INTERNAL MEDICINE | Facility: CLINIC | Age: 57
End: 2022-02-21

## 2022-02-25 ENCOUNTER — LAB (OUTPATIENT)
Dept: LAB | Facility: HOSPITAL | Age: 57
End: 2022-02-25

## 2022-02-25 DIAGNOSIS — E29.1 HYPOGONADISM IN MALE: ICD-10-CM

## 2022-02-25 DIAGNOSIS — D58.2 ELEVATED HEMOGLOBIN: ICD-10-CM

## 2022-02-25 DIAGNOSIS — E78.00 ELEVATED LDL CHOLESTEROL LEVEL: ICD-10-CM

## 2022-02-25 LAB
BASOPHILS # BLD AUTO: 0.03 10*3/MM3 (ref 0–0.2)
BASOPHILS NFR BLD AUTO: 0.5 % (ref 0–1.5)
CHOLEST SERPL-MCNC: 244 MG/DL (ref 0–200)
DEPRECATED RDW RBC AUTO: 43.7 FL (ref 37–54)
EOSINOPHIL # BLD AUTO: 0.27 10*3/MM3 (ref 0–0.4)
EOSINOPHIL NFR BLD AUTO: 4.8 % (ref 0.3–6.2)
ERYTHROCYTE [DISTWIDTH] IN BLOOD BY AUTOMATED COUNT: 12.3 % (ref 12.3–15.4)
HCT VFR BLD AUTO: 49.2 % (ref 37.5–51)
HDLC SERPL-MCNC: 47 MG/DL (ref 40–60)
HGB BLD-MCNC: 16.4 G/DL (ref 13–17.7)
IMM GRANULOCYTES # BLD AUTO: 0.03 10*3/MM3 (ref 0–0.05)
IMM GRANULOCYTES NFR BLD AUTO: 0.5 % (ref 0–0.5)
LDLC SERPL CALC-MCNC: 161 MG/DL (ref 0–100)
LDLC/HDLC SERPL: 3.37 {RATIO}
LYMPHOCYTES # BLD AUTO: 1.31 10*3/MM3 (ref 0.7–3.1)
LYMPHOCYTES NFR BLD AUTO: 23.5 % (ref 19.6–45.3)
MCH RBC QN AUTO: 32.2 PG (ref 26.6–33)
MCHC RBC AUTO-ENTMCNC: 33.3 G/DL (ref 31.5–35.7)
MCV RBC AUTO: 96.5 FL (ref 79–97)
MONOCYTES # BLD AUTO: 0.62 10*3/MM3 (ref 0.1–0.9)
MONOCYTES NFR BLD AUTO: 11.1 % (ref 5–12)
NEUTROPHILS NFR BLD AUTO: 3.32 10*3/MM3 (ref 1.7–7)
NEUTROPHILS NFR BLD AUTO: 59.6 % (ref 42.7–76)
NRBC BLD AUTO-RTO: 0 /100 WBC (ref 0–0.2)
PLATELET # BLD AUTO: 199 10*3/MM3 (ref 140–450)
PMV BLD AUTO: 11.8 FL (ref 6–12)
RBC # BLD AUTO: 5.1 10*6/MM3 (ref 4.14–5.8)
TRIGL SERPL-MCNC: 194 MG/DL (ref 0–150)
VLDLC SERPL-MCNC: 36 MG/DL (ref 5–40)
WBC NRBC COR # BLD: 5.58 10*3/MM3 (ref 3.4–10.8)

## 2022-02-25 PROCEDURE — 84402 ASSAY OF FREE TESTOSTERONE: CPT

## 2022-02-25 PROCEDURE — 85025 COMPLETE CBC W/AUTO DIFF WBC: CPT

## 2022-02-25 PROCEDURE — 84403 ASSAY OF TOTAL TESTOSTERONE: CPT

## 2022-02-25 PROCEDURE — 80061 LIPID PANEL: CPT

## 2022-02-27 LAB
TESTOST FREE SERPL-MCNC: 9.4 PG/ML (ref 7.2–24)
TESTOST SERPL-MCNC: 246 NG/DL (ref 264–916)

## 2022-03-21 ENCOUNTER — OFFICE VISIT (OUTPATIENT)
Dept: SLEEP MEDICINE | Facility: HOSPITAL | Age: 57
End: 2022-03-21

## 2022-03-21 VITALS
SYSTOLIC BLOOD PRESSURE: 145 MMHG | TEMPERATURE: 97.7 F | DIASTOLIC BLOOD PRESSURE: 76 MMHG | WEIGHT: 218 LBS | HEART RATE: 96 BPM | OXYGEN SATURATION: 95 % | BODY MASS INDEX: 32.29 KG/M2 | HEIGHT: 69 IN

## 2022-03-21 DIAGNOSIS — E66.9 CLASS 1 OBESITY: ICD-10-CM

## 2022-03-21 DIAGNOSIS — Z99.89 OSA ON CPAP: Primary | ICD-10-CM

## 2022-03-21 DIAGNOSIS — G47.33 OSA ON CPAP: Primary | ICD-10-CM

## 2022-03-21 PROBLEM — E66.811 CLASS 1 OBESITY: Status: ACTIVE | Noted: 2022-03-21

## 2022-03-21 PROCEDURE — G0463 HOSPITAL OUTPT CLINIC VISIT: HCPCS | Performed by: INTERNAL MEDICINE

## 2022-03-21 PROCEDURE — 99213 OFFICE O/P EST LOW 20 MIN: CPT | Performed by: INTERNAL MEDICINE

## 2022-03-21 NOTE — PROGRESS NOTES
"  [address] [address]  Phone: [phone]  Fax: [phone]      SLEEP CLINIC FOLLOW UP PROGRESS NOTE.    Jose Louie  1965  56 y.o.  male      PCP: Vielka Mauricio MD      Date of visit: 3/21/2022    Chief Complaint   Patient presents with   • Sleep Apnea   • Obesity       HPI:  This is a 56 y.o. years old patient is here for the management of obstructive sleep apnea.  Sleep apnea is severe in severity with a AHI of 43/hr. Patient is using positive airway pressure therapy with CPAP 12 cm and the symptoms of snoring, non-restorative sleep and daytime excessive sleepiness have improved significantly on the therapy. Normally goes to bed at 9:30 PM and wakes up at 630.  The patient wakes up 2 time(s) during the night and has no problem going back to sleep.  Feels refreshed after waking up.  Patient also denies headaches and nasal congestion.  He is a dentist    Medications and allergies are reviewed by me and documented in the encounter.     SOCIAL (habits pertaining to sleep medicine)  History tobacco use:No   History of alcohol use: 4 per week  Caffeine use: 2     REVIEW OF SYSTEMS:   Wolbach Sleepiness Scale :Total score: 1   Nasal congestion:Yes   Dry mouth/nose:No   Post nasal drip; No   Acid reflux/Heartburn:No   Abd bloating:No   Morning headache:No   Anxiety:No   Depression:No    PHYSICAL EXAMINATION:  CONSTITUTIONAL:  Vitals:    03/21/22 1500   BP: 145/76   Pulse: 96   Temp: 97.7 °F (36.5 °C)   SpO2: 95%   Weight: 98.9 kg (218 lb)   Height: 175.3 cm (69\")    Body mass index is 32.19 kg/m².   NOSE: nasal passages are clear, No deformities noted   RESP SYSTEM: Not in any respiratory distress, no chest deformities noted,   CARDIOVASULAR: No edema noted  NEURO: Oriented x 3, gait normal,  Mood and affect appeared appropriate      Data reviewed:  The Smart card downloaded on 3/21/2022 has been reviewed independently by me for " compliance and discussed the data with the patient.   Compliance; 95%  More than 4 hr use, 95%  Average use of the device 8 hours and 41 per night  Residual AHI: 0 point /hr (goal < 5.0 /hr)  Mask type: Nasal cradle  Device: DreamStation 1  DME: [employer]  I reduced the CPAP to 7 cm      ASSESSMENT AND PLAN:  · Obstructive sleep apnea ( G 47.33).  The symptoms of sleep apnea have improved with the device and the treatment.  Patient's compliance with the device is excellent for treatment of sleep apnea.  I have independently reviewed the smart card down load and discussed with the patient the download data and encouarged the patient to continue to use the device.The residual AHI is acceptable. The device is benefiting the patient and the device is medically necessary.  Without proper control of sleep apnea and good compliance there is a increased risk for hypertension, diabetes mellitus and nonrestorative sleep with hypersomnia which can increase risk for motor vehicle accidents.  Untreated sleep apnea is also a risk factor for development of atrial fibrillation, pulmonary hypertension and stroke. The patient is also instructed to get the supplies from the [employer] and and change them on a regular basis.  A prescription for supplies has been sent to the [employer].  I have also discussed the good sleep hygiene habits and adequate amount of sleep needed for good health.  · Obesity  1 with BMI is Body mass index is 32.19 kg/m².. I have discuss the relationship between the weight and sleep apnea. The benefit of weight loss in reducing severity of sleep apnea was discussed. Discussed diet and exercise with the patient to achieve ideal BMI.   · Return in about 1 year (around 3/21/2023) for Annual visit with smartcard download. . Patient's questions were answered.      Mark Anthony Xie MD  Sleep Medicine.  Medical Director, [facility] sleep [facility]  3/21/2022 ,

## 2022-04-29 ENCOUNTER — OFFICE VISIT (OUTPATIENT)
Dept: INTERNAL MEDICINE | Facility: CLINIC | Age: 57
End: 2022-04-29

## 2022-04-29 VITALS
WEIGHT: 225 LBS | DIASTOLIC BLOOD PRESSURE: 80 MMHG | BODY MASS INDEX: 33.33 KG/M2 | OXYGEN SATURATION: 98 % | SYSTOLIC BLOOD PRESSURE: 140 MMHG | HEART RATE: 97 BPM | TEMPERATURE: 97.8 F | HEIGHT: 69 IN

## 2022-04-29 DIAGNOSIS — E06.3 HYPOTHYROIDISM DUE TO HASHIMOTO'S THYROIDITIS: Primary | ICD-10-CM

## 2022-04-29 DIAGNOSIS — E03.8 HYPOTHYROIDISM DUE TO HASHIMOTO'S THYROIDITIS: Primary | ICD-10-CM

## 2022-04-29 DIAGNOSIS — I10 PRIMARY HYPERTENSION: ICD-10-CM

## 2022-04-29 DIAGNOSIS — E29.1 HYPOGONADISM IN MALE: ICD-10-CM

## 2022-04-29 DIAGNOSIS — M1A.0710 CHRONIC GOUT OF RIGHT ANKLE, UNSPECIFIED CAUSE: ICD-10-CM

## 2022-04-29 DIAGNOSIS — E78.2 MIXED HYPERLIPIDEMIA: ICD-10-CM

## 2022-04-29 LAB
ALBUMIN SERPL-MCNC: 4.5 G/DL (ref 3.5–5.2)
ALBUMIN/GLOB SERPL: 1.6 G/DL
ALP SERPL-CCNC: 53 U/L (ref 39–117)
ALT SERPL W P-5'-P-CCNC: 50 U/L (ref 1–41)
AMPHET+METHAMPHET UR QL: NEGATIVE
AMPHETAMINE INTERNAL CONTROL: NORMAL
AMPHETAMINES UR QL: NEGATIVE
ANION GAP SERPL CALCULATED.3IONS-SCNC: 14.4 MMOL/L (ref 5–15)
AST SERPL-CCNC: 39 U/L (ref 1–40)
BARBITURATE INTERNAL CONTROL: NORMAL
BARBITURATES UR QL SCN: NEGATIVE
BASOPHILS # BLD AUTO: 0.02 10*3/MM3 (ref 0–0.2)
BASOPHILS NFR BLD AUTO: 0.2 % (ref 0–1.5)
BENZODIAZ UR QL SCN: NEGATIVE
BENZODIAZEPINE INTERNAL CONTROL: NORMAL
BILIRUB SERPL-MCNC: 0.5 MG/DL (ref 0–1.2)
BUN SERPL-MCNC: 19 MG/DL (ref 6–20)
BUN/CREAT SERPL: 15 (ref 7–25)
BUPRENORPHINE INTERNAL CONTROL: NORMAL
BUPRENORPHINE SERPL-MCNC: NEGATIVE NG/ML
CALCIUM SPEC-SCNC: 9.6 MG/DL (ref 8.6–10.5)
CANNABINOIDS SERPL QL: NEGATIVE
CHLORIDE SERPL-SCNC: 100 MMOL/L (ref 98–107)
CHOLEST SERPL-MCNC: 229 MG/DL (ref 0–200)
CO2 SERPL-SCNC: 24.6 MMOL/L (ref 22–29)
COCAINE INTERNAL CONTROL: NORMAL
COCAINE UR QL: NEGATIVE
CREAT SERPL-MCNC: 1.27 MG/DL (ref 0.76–1.27)
DEPRECATED RDW RBC AUTO: 43.1 FL (ref 37–54)
EGFRCR SERPLBLD CKD-EPI 2021: 66.7 ML/MIN/1.73
EOSINOPHIL # BLD AUTO: 0.12 10*3/MM3 (ref 0–0.4)
EOSINOPHIL NFR BLD AUTO: 1.4 % (ref 0.3–6.2)
ERYTHROCYTE [DISTWIDTH] IN BLOOD BY AUTOMATED COUNT: 12.9 % (ref 12.3–15.4)
EXPIRATION DATE: NORMAL
GLOBULIN UR ELPH-MCNC: 2.9 GM/DL
GLUCOSE SERPL-MCNC: 82 MG/DL (ref 65–99)
HCT VFR BLD AUTO: 46.9 % (ref 37.5–51)
HDLC SERPL-MCNC: 49 MG/DL (ref 40–60)
HGB BLD-MCNC: 16.4 G/DL (ref 13–17.7)
IMM GRANULOCYTES # BLD AUTO: 0.07 10*3/MM3 (ref 0–0.05)
IMM GRANULOCYTES NFR BLD AUTO: 0.8 % (ref 0–0.5)
LDLC SERPL CALC-MCNC: 147 MG/DL (ref 0–100)
LDLC/HDLC SERPL: 2.93 {RATIO}
LYMPHOCYTES # BLD AUTO: 1.09 10*3/MM3 (ref 0.7–3.1)
LYMPHOCYTES NFR BLD AUTO: 12.7 % (ref 19.6–45.3)
Lab: NORMAL
MCH RBC QN AUTO: 32.9 PG (ref 26.6–33)
MCHC RBC AUTO-ENTMCNC: 35 G/DL (ref 31.5–35.7)
MCV RBC AUTO: 94 FL (ref 79–97)
MDMA (ECSTASY) INTERNAL CONTROL: NORMAL
MDMA UR QL SCN: NEGATIVE
METHADONE INTERNAL CONTROL: NORMAL
METHADONE UR QL SCN: NEGATIVE
METHAMPHETAMINE INTERNAL CONTROL: NORMAL
MONOCYTES # BLD AUTO: 0.78 10*3/MM3 (ref 0.1–0.9)
MONOCYTES NFR BLD AUTO: 9.1 % (ref 5–12)
NEUTROPHILS NFR BLD AUTO: 6.48 10*3/MM3 (ref 1.7–7)
NEUTROPHILS NFR BLD AUTO: 75.8 % (ref 42.7–76)
NRBC BLD AUTO-RTO: 0 /100 WBC (ref 0–0.2)
OPIATES INTERNAL CONTROL: NORMAL
OPIATES UR QL: NEGATIVE
OXYCODONE INTERNAL CONTROL: NORMAL
OXYCODONE UR QL SCN: NEGATIVE
PCP UR QL SCN: NEGATIVE
PHENCYCLIDINE INTERNAL CONTROL: NORMAL
PLATELET # BLD AUTO: 181 10*3/MM3 (ref 140–450)
PMV BLD AUTO: 12 FL (ref 6–12)
POTASSIUM SERPL-SCNC: 4.5 MMOL/L (ref 3.5–5.2)
PROT SERPL-MCNC: 7.4 G/DL (ref 6–8.5)
RBC # BLD AUTO: 4.99 10*6/MM3 (ref 4.14–5.8)
SODIUM SERPL-SCNC: 139 MMOL/L (ref 136–145)
T4 FREE SERPL-MCNC: 0.99 NG/DL (ref 0.93–1.7)
THC INTERNAL CONTROL: NORMAL
TRIGL SERPL-MCNC: 183 MG/DL (ref 0–150)
TSH SERPL DL<=0.05 MIU/L-ACNC: 4 UIU/ML (ref 0.27–4.2)
URATE SERPL-MCNC: 7.5 MG/DL (ref 3.4–7)
VLDLC SERPL-MCNC: 33 MG/DL (ref 5–40)
WBC NRBC COR # BLD: 8.56 10*3/MM3 (ref 3.4–10.8)

## 2022-04-29 PROCEDURE — 84439 ASSAY OF FREE THYROXINE: CPT | Performed by: INTERNAL MEDICINE

## 2022-04-29 PROCEDURE — 80061 LIPID PANEL: CPT | Performed by: INTERNAL MEDICINE

## 2022-04-29 PROCEDURE — 80050 GENERAL HEALTH PANEL: CPT | Performed by: INTERNAL MEDICINE

## 2022-04-29 PROCEDURE — 99214 OFFICE O/P EST MOD 30 MIN: CPT | Performed by: INTERNAL MEDICINE

## 2022-04-29 PROCEDURE — 84550 ASSAY OF BLOOD/URIC ACID: CPT | Performed by: INTERNAL MEDICINE

## 2022-04-29 PROCEDURE — 80305 DRUG TEST PRSMV DIR OPT OBS: CPT | Performed by: INTERNAL MEDICINE

## 2022-04-29 PROCEDURE — 84402 ASSAY OF FREE TESTOSTERONE: CPT | Performed by: INTERNAL MEDICINE

## 2022-04-29 PROCEDURE — 84403 ASSAY OF TOTAL TESTOSTERONE: CPT | Performed by: INTERNAL MEDICINE

## 2022-04-29 RX ORDER — AMLODIPINE BESYLATE 2.5 MG/1
2.5 TABLET ORAL DAILY
Qty: 90 TABLET | Refills: 1 | Status: SHIPPED | OUTPATIENT
Start: 2022-04-29 | End: 2022-11-04

## 2022-04-29 NOTE — PROGRESS NOTES
Chief Complaint  Hypothyroidism    Subjective          Jose Louie presents to [facility]  History of Present Illness   The patient is here to follow-up about blood pressure, cholesterol, thyroid, and testosterone.    Gout  The patient states he has been exercising regularly. He notes he believes the allopurinol has been helping. The patient adds he was having some gout attacks and they are good. He notes he has consistent joint, back, knees, and hip pain soreness and stuff but think it is due to old age. The patient believes it flared up after his ankle surgery He notes today is the first day it is better where he is walking pretty good. The patient states he was having attacks in his left ankle before the surgery,. He reports he is unsure if he is having bone pain or something because he did have swelling on the outside of the ankle. The patient does not have a follow-up scheduled at the moment. He states the provider is in [place] and the last time he was there was 2 or 3 years ago. The last time the patient was seen he was informed he will keep needing cleanup surgeries because he is too young to get a fusion. He adds replacement ankles are not a good option and the broken hardware that is in there is going to create that. The patient reports he is trying to stay active at the same time without getting crippled. He states his ankle pain is almost like his gout pain it hits the same way, he will be fine and get up in the middle of the night to use the bathroom and then he can not step on it or put pressure on it.    Blood pressure  He reports his blood pressure medication has changed. The patient states they were trying to get his systolic down. He adds he did not notice a big difference, he was recording it pretty regular for a few weeks and to him it was not high. The patient notes his systolic is still running in the 150s. He states he was on a  "diuretic a while back and during the summer he was sweating a lot. The patient reports he had really bad cramping in weird places which would hurt. He notes since then he found out he can take a multivitamin with magnesium and that helped a ton. The patient adds he has always had high heart rate and then maintain a systolic blood pressure regardless. He reports he was placed on beta blockers one time and they made him feel like crap. The patient states he was placed on a medication before Lipitor and they were trying to get his systolic down so his medication dosage was increased a little and then he was having low systolic readings. He adds he got it down some but it put his diastolic lower and he was getting dizzy. The patient is not currently taking Lipitor.    Testosterone  The patient notes his medication was switched to taking about every 10 days and he can not really tell a difference between taking every 10 days and a week. He reports his testosterone level was high had never been high before until that one time. The patient states he know his numbers are probably not suppose to be that high because he had one that ruptured.     Stress  The patient states as far as he knows the Lexapro medication is working. He notes his daughter says he is not so \"cranky\" anymore. The patient reports he would probably like to go off completely but does not want to experience panic attacks or other changes. He adds stress is always there.     Objective   Vital Signs:   /80   Pulse 97   Temp 97.8 °F (36.6 °C) (Temporal)   Ht 175.3 cm (69\")   Wt 102 kg (225 lb)   SpO2 98%   BMI 33.23 kg/m²     Physical Exam  Vitals reviewed.   Constitutional:       Appearance: Normal appearance. He is well-developed.   HENT:      Head: Normocephalic and atraumatic.      Right Ear: External ear normal.      Left Ear: External ear normal.   Eyes:      Conjunctiva/sclera: Conjunctivae normal.      Pupils: Pupils are equal, round, and " reactive to light.   Cardiovascular:      Rate and Rhythm: Normal rate and regular rhythm.      Heart sounds: No murmur heard.    No friction rub. No gallop.   Pulmonary:      Effort: Pulmonary effort is normal.      Breath sounds: Normal breath sounds. No wheezing or rhonchi.   Skin:     General: Skin is warm and dry.   Neurological:      Mental Status: He is alert and oriented to person, place, and time.   Psychiatric:         Mood and Affect: Affect normal.         Behavior: Behavior normal.         Thought Content: Thought content normal.        Result Review :       Common labs    Common Labsle 10/20/21 10/20/21 10/20/21 1/21/22 1/21/22 1/21/22 1/21/22 2/25/22 2/25/22    0753 0753 0753 0818 0818 0818 0818 0720 0720   Glucose   87  89       BUN   17  13       Creatinine   1.32 (A)  1.12       eGFR Non  Am   56 (A)  68       Sodium   135 (A)  137       Potassium   4.7  4.9       Chloride   101  100       Calcium   9.1  9.9       Albumin   4.40  4.50       Total Bilirubin   0.6  0.4       Alkaline Phosphatase   51  60       AST (SGOT)   34  40       ALT (SGPT)   43 (A)  41       WBC 7.09   7.48    5.58    Hemoglobin 17.1   18.2 (A)    16.4    Hematocrit 51.9 (A)   54.4 (A)    49.2    Platelets 221   219    199    Total Cholesterol  236 (A)    263 (A)   244 (A)   Triglycerides  208 (A)    189 (A)   194 (A)   HDL Cholesterol  43    49   47   LDL Cholesterol   155 (A)    179 (A)   161 (A)   Uric Acid       7.2 (A)     (A) Abnormal value                     Procedures        Assessment and Plan    Diagnoses and all orders for this visit:    1. Hypothyroidism due to Hashimoto's thyroiditis (Primary)  -     T4, Free  -     TSH  -     Cancel: Urine Drug Screen - Urine, Clean Catch; Future  -     Cancel: Urine Drug Screen - Urine, Clean Catch; Future  -     POC Urine Drug Screen [facility]-Cup        - We will check the blood work today and adjust medication if needed.    2. Primary hypertension  -      Comprehensive Metabolic Panel  -     CBC & Differential  -     Lipid Panel  -     T4, Free  -     TSH  -     Testosterone, Free, Total  -     Uric acid; Future  -     Uric acid        - I will start the patient on amlodipine 2.5mg.        - I would like for you to take it at nighttime so take your Benicar in the morning and then amlodipine 2.5mg at night and then they will kind of overlap a little bit to do better, but this medicine also just works better in general if you.    3. Mixed hyperlipidemia  -     Comprehensive Metabolic Panel  -     CBC & Differential  -     Lipid Panel  -     T4, Free  -     TSH  -     Testosterone, Free, Total  -     Uric acid; Future  -     Uric acid        - We will check the patients cholesterol today.        - We may make adjustments if we need to.    4. Hypogonadism in male  -     Testosterone, Free, Total        - We will check the patients testosterone today.    5. Chronic gout of right ankle, unspecified cause  -     Uric acid; Future  -     Uric acid        - We will check the patients uric acid today.        - If the patients uric acid is too high we will adjust the patients allopurinol.        - If the uric acid level is normal then I would like the patient to contact the provider about his ankle.    Other orders  -     amLODIPine (NORVASC) 2.5 MG tablet; Take 1 tablet by mouth Daily.  Dispense: 90 tablet; Refill: 1        {  }          Follow Up   Return in about 6 months (around 10/29/2022).  Patient was given instructions and counseling regarding his condition or for health maintenance advice. Please see specific information pulled into the AVS if appropriate.       Transcribed from ambient dictation for Vielka Mauricio MD by Addi Richard.  04/29/22   13:18 EDT    Patient verbalized consent to the visit recording.

## 2022-05-02 LAB
TESTOST FREE SERPL-MCNC: 30.8 PG/ML (ref 7.2–24)
TESTOST SERPL-MCNC: 1121 NG/DL (ref 264–916)

## 2022-06-03 DIAGNOSIS — E29.1 HYPOGONADISM IN MALE: ICD-10-CM

## 2022-06-03 RX ORDER — TESTOSTERONE CYPIONATE 200 MG/ML
200 INJECTION, SOLUTION INTRAMUSCULAR
Qty: 4 ML | Refills: 2 | Status: SHIPPED | OUTPATIENT
Start: 2022-06-03 | End: 2022-10-05 | Stop reason: SDUPTHER

## 2022-07-14 RX ORDER — OLMESARTAN MEDOXOMIL 40 MG/1
40 TABLET ORAL DAILY
Qty: 90 TABLET | Refills: 1 | Status: SHIPPED | OUTPATIENT
Start: 2022-07-14 | End: 2022-12-09 | Stop reason: SDUPTHER

## 2022-07-14 RX ORDER — ESCITALOPRAM OXALATE 5 MG/1
5 TABLET ORAL DAILY
Qty: 90 TABLET | Refills: 0 | Status: SHIPPED | OUTPATIENT
Start: 2022-07-14 | End: 2022-09-16 | Stop reason: SDUPTHER

## 2022-07-29 ENCOUNTER — OFFICE VISIT (OUTPATIENT)
Dept: INTERNAL MEDICINE | Facility: CLINIC | Age: 57
End: 2022-07-29

## 2022-07-29 VITALS
DIASTOLIC BLOOD PRESSURE: 48 MMHG | BODY MASS INDEX: 33.37 KG/M2 | HEIGHT: 68 IN | OXYGEN SATURATION: 98 % | TEMPERATURE: 97.5 F | HEART RATE: 95 BPM | WEIGHT: 220.2 LBS | SYSTOLIC BLOOD PRESSURE: 126 MMHG

## 2022-07-29 DIAGNOSIS — E03.9 HYPOTHYROIDISM, UNSPECIFIED TYPE: Primary | ICD-10-CM

## 2022-07-29 DIAGNOSIS — Z51.81 MEDICATION MONITORING ENCOUNTER: ICD-10-CM

## 2022-07-29 DIAGNOSIS — E29.1 HYPOGONADISM IN MALE: ICD-10-CM

## 2022-07-29 LAB
T4 FREE SERPL-MCNC: 0.97 NG/DL (ref 0.93–1.7)
TSH SERPL DL<=0.05 MIU/L-ACNC: 2.73 UIU/ML (ref 0.27–4.2)

## 2022-07-29 PROCEDURE — 84403 ASSAY OF TOTAL TESTOSTERONE: CPT | Performed by: STUDENT IN AN ORGANIZED HEALTH CARE EDUCATION/TRAINING PROGRAM

## 2022-07-29 PROCEDURE — 99213 OFFICE O/P EST LOW 20 MIN: CPT | Performed by: STUDENT IN AN ORGANIZED HEALTH CARE EDUCATION/TRAINING PROGRAM

## 2022-07-29 PROCEDURE — 84439 ASSAY OF FREE THYROXINE: CPT | Performed by: STUDENT IN AN ORGANIZED HEALTH CARE EDUCATION/TRAINING PROGRAM

## 2022-07-29 PROCEDURE — 84402 ASSAY OF FREE TESTOSTERONE: CPT | Performed by: STUDENT IN AN ORGANIZED HEALTH CARE EDUCATION/TRAINING PROGRAM

## 2022-07-29 PROCEDURE — 84443 ASSAY THYROID STIM HORMONE: CPT | Performed by: STUDENT IN AN ORGANIZED HEALTH CARE EDUCATION/TRAINING PROGRAM

## 2022-08-04 LAB
TESTOST FREE SERPL-MCNC: 15.9 PG/ML (ref 7.2–24)
TESTOST SERPL-MCNC: 504 NG/DL (ref 264–916)

## 2022-09-19 RX ORDER — ESCITALOPRAM OXALATE 5 MG/1
5 TABLET ORAL DAILY
Qty: 90 TABLET | Refills: 0 | Status: SHIPPED | OUTPATIENT
Start: 2022-09-19 | End: 2022-12-09 | Stop reason: SDUPTHER

## 2022-10-05 DIAGNOSIS — E29.1 HYPOGONADISM IN MALE: ICD-10-CM

## 2022-10-06 NOTE — TELEPHONE ENCOUNTER
LOV was 07/29/22, LRF was 06/03/22 4 mL with 2 refills, UDS completed on 04/29/22, feliberto last ran on 05/04/20 and contract signed on 11/03/21. Patient is due for a feliberto and contract.

## 2022-10-11 RX ORDER — TESTOSTERONE CYPIONATE 200 MG/ML
200 INJECTION, SOLUTION INTRAMUSCULAR
Qty: 4 ML | Refills: 2 | Status: SHIPPED | OUTPATIENT
Start: 2022-10-11 | End: 2023-03-31 | Stop reason: SDUPTHER

## 2022-11-04 ENCOUNTER — OFFICE VISIT (OUTPATIENT)
Dept: INTERNAL MEDICINE | Facility: CLINIC | Age: 57
End: 2022-11-04

## 2022-11-04 VITALS
DIASTOLIC BLOOD PRESSURE: 60 MMHG | RESPIRATION RATE: 18 BRPM | WEIGHT: 220.6 LBS | OXYGEN SATURATION: 95 % | HEART RATE: 93 BPM | TEMPERATURE: 98.4 F | SYSTOLIC BLOOD PRESSURE: 124 MMHG | HEIGHT: 68 IN | BODY MASS INDEX: 33.43 KG/M2

## 2022-11-04 DIAGNOSIS — Z51.81 MEDICATION MONITORING ENCOUNTER: ICD-10-CM

## 2022-11-04 DIAGNOSIS — E78.2 MIXED HYPERLIPIDEMIA: ICD-10-CM

## 2022-11-04 DIAGNOSIS — E29.1 HYPOGONADISM IN MALE: ICD-10-CM

## 2022-11-04 DIAGNOSIS — E06.3 HYPOTHYROIDISM DUE TO HASHIMOTO'S THYROIDITIS: ICD-10-CM

## 2022-11-04 DIAGNOSIS — M1A.0710 CHRONIC GOUT OF RIGHT ANKLE, UNSPECIFIED CAUSE: ICD-10-CM

## 2022-11-04 DIAGNOSIS — I10 PRIMARY HYPERTENSION: Primary | ICD-10-CM

## 2022-11-04 DIAGNOSIS — E03.8 HYPOTHYROIDISM DUE TO HASHIMOTO'S THYROIDITIS: ICD-10-CM

## 2022-11-04 LAB
ALBUMIN SERPL-MCNC: 4.2 G/DL (ref 3.5–5.2)
ALBUMIN/GLOB SERPL: 1.5 G/DL
ALP SERPL-CCNC: 50 U/L (ref 39–117)
ALT SERPL W P-5'-P-CCNC: 43 U/L (ref 1–41)
AMPHET+METHAMPHET UR QL: NEGATIVE
AMPHETAMINE INTERNAL CONTROL: NORMAL
AMPHETAMINES UR QL: NEGATIVE
ANION GAP SERPL CALCULATED.3IONS-SCNC: 9.6 MMOL/L (ref 5–15)
AST SERPL-CCNC: 39 U/L (ref 1–40)
BARBITURATE INTERNAL CONTROL: NORMAL
BARBITURATES UR QL SCN: NEGATIVE
BASOPHILS # BLD AUTO: 0.03 10*3/MM3 (ref 0–0.2)
BASOPHILS NFR BLD AUTO: 0.4 % (ref 0–1.5)
BENZODIAZ UR QL SCN: NEGATIVE
BENZODIAZEPINE INTERNAL CONTROL: NORMAL
BILIRUB SERPL-MCNC: 0.5 MG/DL (ref 0–1.2)
BUN SERPL-MCNC: 15 MG/DL (ref 6–20)
BUN/CREAT SERPL: 13.5 (ref 7–25)
BUPRENORPHINE INTERNAL CONTROL: NORMAL
BUPRENORPHINE SERPL-MCNC: NEGATIVE NG/ML
CALCIUM SPEC-SCNC: 9.6 MG/DL (ref 8.6–10.5)
CANNABINOIDS SERPL QL: NEGATIVE
CHLORIDE SERPL-SCNC: 99 MMOL/L (ref 98–107)
CHOLEST SERPL-MCNC: 233 MG/DL (ref 0–200)
CO2 SERPL-SCNC: 25.4 MMOL/L (ref 22–29)
COCAINE INTERNAL CONTROL: NORMAL
COCAINE UR QL: NEGATIVE
CREAT SERPL-MCNC: 1.11 MG/DL (ref 0.76–1.27)
DEPRECATED RDW RBC AUTO: 43.3 FL (ref 37–54)
EGFRCR SERPLBLD CKD-EPI 2021: 77.9 ML/MIN/1.73
EOSINOPHIL # BLD AUTO: 0.08 10*3/MM3 (ref 0–0.4)
EOSINOPHIL NFR BLD AUTO: 1 % (ref 0.3–6.2)
ERYTHROCYTE [DISTWIDTH] IN BLOOD BY AUTOMATED COUNT: 12.3 % (ref 12.3–15.4)
EXPIRATION DATE: NORMAL
GLOBULIN UR ELPH-MCNC: 2.8 GM/DL
GLUCOSE SERPL-MCNC: 82 MG/DL (ref 65–99)
HCT VFR BLD AUTO: 45.8 % (ref 37.5–51)
HDLC SERPL-MCNC: 50 MG/DL (ref 40–60)
HGB BLD-MCNC: 15.8 G/DL (ref 13–17.7)
IMM GRANULOCYTES # BLD AUTO: 0.03 10*3/MM3 (ref 0–0.05)
IMM GRANULOCYTES NFR BLD AUTO: 0.4 % (ref 0–0.5)
LDLC SERPL CALC-MCNC: 150 MG/DL (ref 0–100)
LDLC/HDLC SERPL: 2.94 {RATIO}
LYMPHOCYTES # BLD AUTO: 1.08 10*3/MM3 (ref 0.7–3.1)
LYMPHOCYTES NFR BLD AUTO: 13.9 % (ref 19.6–45.3)
Lab: NORMAL
MCH RBC QN AUTO: 32.9 PG (ref 26.6–33)
MCHC RBC AUTO-ENTMCNC: 34.5 G/DL (ref 31.5–35.7)
MCV RBC AUTO: 95.4 FL (ref 79–97)
MDMA (ECSTASY) INTERNAL CONTROL: NORMAL
MDMA UR QL SCN: NEGATIVE
METHADONE INTERNAL CONTROL: NORMAL
METHADONE UR QL SCN: NEGATIVE
METHAMPHETAMINE INTERNAL CONTROL: NORMAL
MONOCYTES # BLD AUTO: 0.71 10*3/MM3 (ref 0.1–0.9)
MONOCYTES NFR BLD AUTO: 9.1 % (ref 5–12)
NEUTROPHILS NFR BLD AUTO: 5.83 10*3/MM3 (ref 1.7–7)
NEUTROPHILS NFR BLD AUTO: 75.2 % (ref 42.7–76)
NRBC BLD AUTO-RTO: 0 /100 WBC (ref 0–0.2)
OPIATES INTERNAL CONTROL: NORMAL
OPIATES UR QL: NEGATIVE
OXYCODONE INTERNAL CONTROL: NORMAL
OXYCODONE UR QL SCN: NEGATIVE
PCP UR QL SCN: NEGATIVE
PHENCYCLIDINE INTERNAL CONTROL: NORMAL
PLATELET # BLD AUTO: 187 10*3/MM3 (ref 140–450)
PMV BLD AUTO: 12 FL (ref 6–12)
POTASSIUM SERPL-SCNC: 4.6 MMOL/L (ref 3.5–5.2)
PROT SERPL-MCNC: 7 G/DL (ref 6–8.5)
PSA SERPL-MCNC: 1.59 NG/ML (ref 0–4)
RBC # BLD AUTO: 4.8 10*6/MM3 (ref 4.14–5.8)
SODIUM SERPL-SCNC: 134 MMOL/L (ref 136–145)
T4 FREE SERPL-MCNC: 1.03 NG/DL (ref 0.93–1.7)
THC INTERNAL CONTROL: NORMAL
TRIGL SERPL-MCNC: 181 MG/DL (ref 0–150)
TSH SERPL DL<=0.05 MIU/L-ACNC: 2.1 UIU/ML (ref 0.27–4.2)
VLDLC SERPL-MCNC: 33 MG/DL (ref 5–40)
WBC NRBC COR # BLD: 7.76 10*3/MM3 (ref 3.4–10.8)

## 2022-11-04 PROCEDURE — 84153 ASSAY OF PSA TOTAL: CPT | Performed by: INTERNAL MEDICINE

## 2022-11-04 PROCEDURE — 90686 IIV4 VACC NO PRSV 0.5 ML IM: CPT | Performed by: INTERNAL MEDICINE

## 2022-11-04 PROCEDURE — 90471 IMMUNIZATION ADMIN: CPT | Performed by: INTERNAL MEDICINE

## 2022-11-04 PROCEDURE — 84402 ASSAY OF FREE TESTOSTERONE: CPT | Performed by: INTERNAL MEDICINE

## 2022-11-04 PROCEDURE — 80061 LIPID PANEL: CPT | Performed by: INTERNAL MEDICINE

## 2022-11-04 PROCEDURE — 84403 ASSAY OF TOTAL TESTOSTERONE: CPT | Performed by: INTERNAL MEDICINE

## 2022-11-04 PROCEDURE — 84439 ASSAY OF FREE THYROXINE: CPT | Performed by: INTERNAL MEDICINE

## 2022-11-04 PROCEDURE — 99214 OFFICE O/P EST MOD 30 MIN: CPT | Performed by: INTERNAL MEDICINE

## 2022-11-04 PROCEDURE — 80050 GENERAL HEALTH PANEL: CPT | Performed by: INTERNAL MEDICINE

## 2022-11-04 PROCEDURE — 80305 DRUG TEST PRSMV DIR OPT OBS: CPT | Performed by: INTERNAL MEDICINE

## 2022-11-04 RX ORDER — CERAMIDE 1,3,6-II/SALICYLIC/B3
1 CLEANSER (ML) TOPICAL 2 TIMES DAILY
Qty: 350 G | Refills: 2 | Status: SHIPPED | OUTPATIENT
Start: 2022-11-04

## 2022-11-04 RX ORDER — TRIAMCINOLONE ACETONIDE 5 MG/G
1 OINTMENT TOPICAL 2 TIMES DAILY
Qty: 15 G | Refills: 1 | Status: SHIPPED | OUTPATIENT
Start: 2022-11-04

## 2022-11-04 NOTE — PROGRESS NOTES
"Chief Complaint  Hypothyroidism due to Hashimoto's thyroiditis  (6 Month follow up), Hypertension, Hyperlipidemia, Hypogonadism in male, and Rash (Hands- 3 weeks )    Subjective          Jose Louie presents to [facility]  History of Present Illness     Rash-  Skin lesions on hands been there about two weeks    HTN-  Running well at home  130/70's  No chest pain  No trouble breathing    Anxiety-  Doing ok on the low dose lexapro    Hypogonadism-  Went to every two weeks with his testosterone therapy  Feels ok on current dosing     Eating better and exercising more        Objective   Vital Signs:   /60 (BP Location: Right arm, Patient Position: Sitting, Cuff Size: Adult)   Pulse 93   Temp 98.4 °F (36.9 °C)   Resp 18   Ht 172.7 cm (68\")   Wt 100 kg (220 lb 9.6 oz)   SpO2 95%   BMI 33.54 kg/m²     Physical Exam  Vitals reviewed.   Constitutional:       Appearance: Normal appearance. He is well-developed.   HENT:      Head: Normocephalic and atraumatic.      Right Ear: External ear normal.      Left Ear: External ear normal.   Eyes:      Conjunctiva/sclera: Conjunctivae normal.      Pupils: Pupils are equal, round, and reactive to light.   Cardiovascular:      Rate and Rhythm: Normal rate and regular rhythm.      Heart sounds: No murmur heard.    No friction rub. No gallop.   Pulmonary:      Effort: Pulmonary effort is normal.      Breath sounds: Normal breath sounds. No wheezing or rhonchi.   Skin:     General: Skin is warm and dry.   Neurological:      Mental Status: He is alert and oriented to person, place, and time.   Psychiatric:         Mood and Affect: Affect normal.         Behavior: Behavior normal.         Thought Content: Thought content normal.        Result Review :       Common labs    Common Labs 2/25/22 2/25/22 4/29/22 4/29/22 4/29/22 4/29/22 11/4/22 11/4/22 11/4/22 11/4/22    0720 0720 1145 1145 1145 1145 1136 1136 1136 1136 "   Glucose    82      82   BUN    19      15   Creatinine    1.27      1.11   Sodium    139      134 (A)   Potassium    4.5      4.6   Chloride    100      99   Calcium    9.6      9.6   Albumin    4.50      4.20   Total Bilirubin    0.5      0.5   Alkaline Phosphatase    53      50   AST (SGOT)    39      39   ALT (SGPT)    50 (A)      43 (A)   WBC 5.58  8.56    7.76      Hemoglobin 16.4  16.4    15.8      Hematocrit 49.2  46.9    45.8      Platelets 199  181    187      Total Cholesterol  244 (A)   229 (A)   233 (A)     Triglycerides  194 (A)   183 (A)   181 (A)     HDL Cholesterol  47   49   50     LDL Cholesterol   161 (A)   147 (A)   150 (A)     PSA         1.590    Uric Acid      7.5 (A)       (A) Abnormal value              Results for orders placed or performed in visit on 11/04/22   Comprehensive Metabolic Panel    Specimen: Arm, Left; Blood   Result Value Ref Range    Glucose 82 65 - 99 mg/dL    BUN 15 6 - 20 mg/dL    Creatinine 1.11 0.76 - 1.27 mg/dL    Sodium 134 (L) 136 - 145 mmol/L    Potassium 4.6 3.5 - 5.2 mmol/L    Chloride 99 98 - 107 mmol/L    CO2 25.4 22.0 - 29.0 mmol/L    Calcium 9.6 8.6 - 10.5 mg/dL    Total Protein 7.0 6.0 - 8.5 g/dL    Albumin 4.20 3.50 - 5.20 g/dL    ALT (SGPT) 43 (H) 1 - 41 U/L    AST (SGOT) 39 1 - 40 U/L    Alkaline Phosphatase 50 39 - 117 U/L    Total Bilirubin 0.5 0.0 - 1.2 mg/dL    Globulin 2.8 gm/dL    A/G Ratio 1.5 g/dL    BUN/Creatinine Ratio 13.5 7.0 - 25.0    Anion Gap 9.6 5.0 - 15.0 mmol/L    eGFR 77.9 >60.0 mL/min/1.73   TSH    Specimen: Arm, Left; Blood   Result Value Ref Range    TSH 2.100 0.270 - 4.200 uIU/mL   Lipid Panel    Specimen: Arm, Left; Blood   Result Value Ref Range    Total Cholesterol 233 (H) 0 - 200 mg/dL    Triglycerides 181 (H) 0 - 150 mg/dL    HDL Cholesterol 50 40 - 60 mg/dL    LDL Cholesterol  150 (H) 0 - 100 mg/dL    VLDL Cholesterol 33 5 - 40 mg/dL    LDL/HDL Ratio 2.94    T4, Free    Specimen: Arm, Left; Blood   Result Value Ref Range     Free T4 1.03 0.93 - 1.70 ng/dL   Testosterone, Free, Total    Specimen: Arm, Left; Blood   Result Value Ref Range    Testosterone, Total 700 264 - 916 ng/dL    Testosterone, Free 21.5 7.2 - 24.0 pg/mL   PSA DIAGNOSTIC    Specimen: Arm, Left; Blood   Result Value Ref Range    PSA 1.590 0.000 - 4.000 ng/mL   CBC Auto Differential    Specimen: Arm, Left; Blood   Result Value Ref Range    WBC 7.76 3.40 - 10.80 10*3/mm3    RBC 4.80 4.14 - 5.80 10*6/mm3    Hemoglobin 15.8 13.0 - 17.7 g/dL    Hematocrit 45.8 37.5 - 51.0 %    MCV 95.4 79.0 - 97.0 fL    MCH 32.9 26.6 - 33.0 pg    MCHC 34.5 31.5 - 35.7 g/dL    RDW 12.3 12.3 - 15.4 %    RDW-SD 43.3 37.0 - 54.0 fl    MPV 12.0 6.0 - 12.0 fL    Platelets 187 140 - 450 10*3/mm3    Neutrophil % 75.2 42.7 - 76.0 %    Lymphocyte % 13.9 (L) 19.6 - 45.3 %    Monocyte % 9.1 5.0 - 12.0 %    Eosinophil % 1.0 0.3 - 6.2 %    Basophil % 0.4 0.0 - 1.5 %    Immature Grans % 0.4 0.0 - 0.5 %    Neutrophils, Absolute 5.83 1.70 - 7.00 10*3/mm3    Lymphocytes, Absolute 1.08 0.70 - 3.10 10*3/mm3    Monocytes, Absolute 0.71 0.10 - 0.90 10*3/mm3    Eosinophils, Absolute 0.08 0.00 - 0.40 10*3/mm3    Basophils, Absolute 0.03 0.00 - 0.20 10*3/mm3    Immature Grans, Absolute 0.03 0.00 - 0.05 10*3/mm3    nRBC 0.0 0.0 - 0.2 /100 WBC   POC Urine Drug Screen [facility]    Specimen: Urine   Result Value Ref Range    Amphetamine Screen, Urine Negative Negative    AMP INTERNAL CONTROL Passed Passed    Barbiturates Screen, Urine Negative Negative    BARBITURATE INTERNAL CONTROL Passed Passed    Buprenorphine, Screen, Urine Negative Negative    BUPRENORPHINE INTERNAL CONTROL Passed Passed    Benzodiazepine Screen, Urine Negative Negative    BENZODIAZEPINE INTERNAL CONTROL Passed Passed    Cocaine Screen, Urine Negative Negative    COCAINE INTERNAL CONTROL Passed Passed    MDMA (ECSTASY) Negative Negative    MDMA (ECSTASY) INTERNAL CONTROL Passed Passed    Methamphetamine, Ur Negative Negative     METHAMPHETAMINE INTERNAL CONTROL Passed Passed    Methadone Screen, Urine Negative Negative    METHADONE INTERNAL CONTROL Passed Passed    Opiate Screen Negative Negative    OPIATES INTERNAL CONTROL Passed Passed    Oxycodone Screen, Urine Negative Negative    OXYCODONE INTERNAL CONTROL Passed Passed    Phencyclidine (PCP), Urine Negative Negative    PHENCYCLIDINE INTERNAL CONTROL Passed Passed    THC, Screen, Urine Negative Negative    THC INTERNAL CONTROL Passed Passed    Lot Number [id number]     Expiration Date 2/29/24             Procedures        Assessment and Plan    Diagnoses and all orders for this visit:    1. Primary hypertension (Primary)  Comments:  well controlled cont current meds  Orders:  -     Comprehensive Metabolic Panel  -     CBC & Differential  -     TSH  -     Lipid Panel  -     T4, Free  -     Testosterone, Free, Total  -     Uric acid; Future    2. Mixed hyperlipidemia  Comments:  will check labs and adjust as needed  Orders:  -     Lipid Panel    3. Hypothyroidism due to Hashimoto's thyroiditis  Comments:  feeling well, check labs and adjust  Orders:  -     TSH  -     T4, Free    4. Hypogonadism in male  Comments:  feeling well, cont current meds  Orders:  -     Testosterone, Free, Total  -     PSA DIAGNOSTIC    5. Chronic gout of right ankle, unspecified cause  Comments:  will check uric acid and determine needs for treatment  Orders:  -     Uric acid; Future    6. Medication monitoring encounter  -     POC Urine Drug Screen [facility]    Other orders  -     Emollient (CeraVe Moisturizing) cream; Apply 1 application topically 2 (Two) Times a Day.  Dispense: 350 g; Refill: 2  -     triamcinolone (KENALOG) 0.5 % ointment; Apply 1 application topically to the appropriate area as directed 2 (Two) Times a Day.  Dispense: 15 g; Refill: 1  -     FluLaval/Fluarix/Fluzone >6 Months        BMI is >= 30 and <35. (Class 1 Obesity). The following options were offered after discussion;: exercise  counseling/recommendations            Follow Up   No follow-ups on file.  Patient was given instructions and counseling regarding his condition or for health maintenance advice. Please see specific information pulled into the AVS if appropriate.

## 2022-11-07 LAB
TESTOST FREE SERPL-MCNC: 21.5 PG/ML (ref 7.2–24)
TESTOST SERPL-MCNC: 700 NG/DL (ref 264–916)

## 2022-11-12 ENCOUNTER — HOSPITAL ENCOUNTER (EMERGENCY)
Facility: HOSPITAL | Age: 57
Discharge: HOME | End: 2022-11-12
Attending: EMERGENCY MEDICINE | Admitting: EMERGENCY MEDICINE

## 2022-11-12 ENCOUNTER — APPOINTMENT (OUTPATIENT)
Dept: CT IMAGING | Facility: HOSPITAL | Age: 57
End: 2022-11-12

## 2022-11-12 VITALS
WEIGHT: 221.78 LBS | BODY MASS INDEX: 32.85 KG/M2 | RESPIRATION RATE: 16 BRPM | DIASTOLIC BLOOD PRESSURE: 73 MMHG | OXYGEN SATURATION: 96 % | HEART RATE: 77 BPM | HEIGHT: 69 IN | SYSTOLIC BLOOD PRESSURE: 132 MMHG | TEMPERATURE: 98.2 F

## 2022-11-12 DIAGNOSIS — S39.011A STRAIN OF ABDOMINAL WALL, INITIAL ENCOUNTER: Primary | ICD-10-CM

## 2022-11-12 DIAGNOSIS — E86.0 DEHYDRATION: ICD-10-CM

## 2022-11-12 LAB
ALBUMIN SERPL-MCNC: 4.5 G/DL (ref 3.5–5.2)
ALBUMIN/GLOB SERPL: 1.7 G/DL
ALP SERPL-CCNC: 53 U/L (ref 39–117)
ALT SERPL W P-5'-P-CCNC: 46 U/L (ref 1–41)
ANION GAP SERPL CALCULATED.3IONS-SCNC: 14.3 MMOL/L (ref 5–15)
AST SERPL-CCNC: 36 U/L (ref 1–40)
BASOPHILS # BLD AUTO: 0.03 10*3/MM3 (ref 0–0.2)
BASOPHILS NFR BLD AUTO: 0.3 % (ref 0–1.5)
BILIRUB SERPL-MCNC: 0.3 MG/DL (ref 0–1.2)
BILIRUB UR QL STRIP: NEGATIVE
BUN SERPL-MCNC: 22 MG/DL (ref 6–20)
BUN/CREAT SERPL: 16.3 (ref 7–25)
CALCIUM SPEC-SCNC: 9.1 MG/DL (ref 8.6–10.5)
CHLORIDE SERPL-SCNC: 102 MMOL/L (ref 98–107)
CLARITY UR: CLEAR
CO2 SERPL-SCNC: 23.7 MMOL/L (ref 22–29)
COLOR UR: YELLOW
CREAT SERPL-MCNC: 1.35 MG/DL (ref 0.76–1.27)
D-LACTATE SERPL-SCNC: 2 MMOL/L (ref 0.5–2)
DEPRECATED RDW RBC AUTO: 45.1 FL (ref 37–54)
EGFRCR SERPLBLD CKD-EPI 2021: 61.6 ML/MIN/1.73
EOSINOPHIL # BLD AUTO: 0.15 10*3/MM3 (ref 0–0.4)
EOSINOPHIL NFR BLD AUTO: 1.6 % (ref 0.3–6.2)
ERYTHROCYTE [DISTWIDTH] IN BLOOD BY AUTOMATED COUNT: 12.7 % (ref 12.3–15.4)
GLOBULIN UR ELPH-MCNC: 2.7 GM/DL
GLUCOSE SERPL-MCNC: 91 MG/DL (ref 65–99)
GLUCOSE UR STRIP-MCNC: NEGATIVE MG/DL
HCT VFR BLD AUTO: 49 % (ref 37.5–51)
HGB BLD-MCNC: 16.9 G/DL (ref 13–17.7)
HGB UR QL STRIP.AUTO: NEGATIVE
HOLD SPECIMEN: NORMAL
HOLD SPECIMEN: NORMAL
IMM GRANULOCYTES # BLD AUTO: 0.04 10*3/MM3 (ref 0–0.05)
IMM GRANULOCYTES NFR BLD AUTO: 0.4 % (ref 0–0.5)
KETONES UR QL STRIP: ABNORMAL
LEUKOCYTE ESTERASE UR QL STRIP.AUTO: NEGATIVE
LIPASE SERPL-CCNC: 68 U/L (ref 13–60)
LYMPHOCYTES # BLD AUTO: 1.52 10*3/MM3 (ref 0.7–3.1)
LYMPHOCYTES NFR BLD AUTO: 16.2 % (ref 19.6–45.3)
MCH RBC QN AUTO: 33.3 PG (ref 26.6–33)
MCHC RBC AUTO-ENTMCNC: 34.5 G/DL (ref 31.5–35.7)
MCV RBC AUTO: 96.6 FL (ref 79–97)
MONOCYTES # BLD AUTO: 0.74 10*3/MM3 (ref 0.1–0.9)
MONOCYTES NFR BLD AUTO: 7.9 % (ref 5–12)
NEUTROPHILS NFR BLD AUTO: 6.89 10*3/MM3 (ref 1.7–7)
NEUTROPHILS NFR BLD AUTO: 73.6 % (ref 42.7–76)
NITRITE UR QL STRIP: NEGATIVE
NRBC BLD AUTO-RTO: 0 /100 WBC (ref 0–0.2)
PH UR STRIP.AUTO: 5.5 [PH] (ref 5–8)
PLATELET # BLD AUTO: 198 10*3/MM3 (ref 140–450)
PMV BLD AUTO: 10.1 FL (ref 6–12)
POTASSIUM SERPL-SCNC: 4.4 MMOL/L (ref 3.5–5.2)
PROT SERPL-MCNC: 7.2 G/DL (ref 6–8.5)
PROT UR QL STRIP: NEGATIVE
RBC # BLD AUTO: 5.07 10*6/MM3 (ref 4.14–5.8)
SODIUM SERPL-SCNC: 140 MMOL/L (ref 136–145)
SP GR UR STRIP: 1.01 (ref 1–1.03)
UROBILINOGEN UR QL STRIP: ABNORMAL
WBC NRBC COR # BLD: 9.37 10*3/MM3 (ref 3.4–10.8)
WHOLE BLOOD HOLD COAG: NORMAL
WHOLE BLOOD HOLD SPECIMEN: NORMAL

## 2022-11-12 PROCEDURE — 83690 ASSAY OF LIPASE: CPT

## 2022-11-12 PROCEDURE — 25010000002 HYDROMORPHONE 1 MG/ML SOLUTION: Performed by: EMERGENCY MEDICINE

## 2022-11-12 PROCEDURE — 74177 CT ABD & PELVIS W/CONTRAST: CPT

## 2022-11-12 PROCEDURE — 96374 THER/PROPH/DIAG INJ IV PUSH: CPT

## 2022-11-12 PROCEDURE — 80053 COMPREHEN METABOLIC PANEL: CPT

## 2022-11-12 PROCEDURE — 83605 ASSAY OF LACTIC ACID: CPT

## 2022-11-12 PROCEDURE — 36415 COLL VENOUS BLD VENIPUNCTURE: CPT

## 2022-11-12 PROCEDURE — 81003 URINALYSIS AUTO W/O SCOPE: CPT | Performed by: EMERGENCY MEDICINE

## 2022-11-12 PROCEDURE — 99284 EMERGENCY DEPT VISIT MOD MDM: CPT

## 2022-11-12 PROCEDURE — 85025 COMPLETE CBC W/AUTO DIFF WBC: CPT

## 2022-11-12 PROCEDURE — 0 IOPAMIDOL PER 1 ML: Performed by: EMERGENCY MEDICINE

## 2022-11-12 RX ORDER — DICYCLOMINE HYDROCHLORIDE 10 MG/ML
20 INJECTION INTRAMUSCULAR ONCE
Status: DISCONTINUED | OUTPATIENT
Start: 2022-11-12 | End: 2022-11-12

## 2022-11-12 RX ORDER — OXYCODONE HYDROCHLORIDE AND ACETAMINOPHEN 5; 325 MG/1; MG/1
1 TABLET ORAL EVERY 6 HOURS PRN
Qty: 20 TABLET | Refills: 0 | Status: SHIPPED | OUTPATIENT
Start: 2022-11-12

## 2022-11-12 RX ORDER — SODIUM CHLORIDE 0.9 % (FLUSH) 0.9 %
10 SYRINGE (ML) INJECTION AS NEEDED
Status: DISCONTINUED | OUTPATIENT
Start: 2022-11-12 | End: 2022-11-12 | Stop reason: HOSPADM

## 2022-11-12 RX ORDER — CYCLOBENZAPRINE HCL 10 MG
10 TABLET ORAL 3 TIMES DAILY PRN
Qty: 21 TABLET | Refills: 0 | Status: SHIPPED | OUTPATIENT
Start: 2022-11-12

## 2022-11-12 RX ADMIN — SODIUM CHLORIDE 1000 ML: 9 INJECTION, SOLUTION INTRAVENOUS at 19:39

## 2022-11-12 RX ADMIN — HYDROMORPHONE HYDROCHLORIDE 0.5 MG: 1 INJECTION, SOLUTION INTRAMUSCULAR; INTRAVENOUS; SUBCUTANEOUS at 17:28

## 2022-11-12 RX ADMIN — IOPAMIDOL 100 ML: 755 INJECTION, SOLUTION INTRAVENOUS at 18:46

## 2022-11-12 NOTE — ED PROVIDER NOTES
Time: 5:04 PM EST  Chief Complaint: Abdominal pain  Chief Complaint   Patient presents with   • Abdominal Pain   • Back Pain           History of Present Illness:  Patient is a 57 y.o. year old male who presents to the emergency department with complaints of upper abdominal pain.    Patient arrives to ED via private car with family at bedside who helps to report clinical history. Patient has a medical history of GERD, obesity with TYLER on CPAP, mixed hyperlipidemia, Hashimotos thyroiditis, hypogonadism. Patient has no history of smoking, is a current alcohol user, and no history of drug use.     Patient states he initially started having right-sided and mid-back pain symptoms last week and states it is now radiating toward his abdomen Wednesday (11/09/2022). Patient states the back pain migrated toward the front of his right upper quadrant region of his abdomen. Patient states the pain is intermittent with cramping. Patient describes cramping as â€œcontractionsâ€ where his abdominal muscles will spasm intermittently. . Patient states the pain and spasms worsen with movement and eating greasy foods. Patient describes the pain as sharp and stabbing, and rates his pain 3 out of 10 at rest but 7 out of 10 with activity. Patient also confirms symptoms of back pain, but denies fever, chills, cough, congestion, rhinorrhea, sore throat, SOB, chest pain, nausea, vomiting, diarrhea. Patient exercises regularly, but is unsure of if the pain is related to that. Patient tried OTC ibuprofen with no relief to symptoms. Patient has not had any fluid or food intake since ED arrival.      History provided by:  Patient, relative and medical records   used: No            Patient Care Team  Primary Care Provider: Vielka Mauricio MD    Past Medical History:     No Known Allergies  Past Medical History:   Diagnosis Date   • Acid reflux    • Allergies    • Anxiety    • Arthritis    • Back pain    • CPAP use  counseling 11/27/2017   • Deafness    • Fungal toenail infection 03/04/2019   • Heel pain    • Hemorrhoid    • HTN (hypertension) 12/08/2014   • Hyperlipidemia 05/31/2017   • Hypogonadism in male 12/08/2014    testicular failure   • Hypothyroidism    • Low testosterone level in male 05/31/2017   • Night sweats    • Panic disorder 12/09/2014   • Sleep apnea 11/27/2017     Past Surgical History:   Procedure Laterality Date   • ANKLE ARTHROSCOPY W/ OPEN REPAIR      1983, 2017   • COLONOSCOPY      1/2020; [facility], history of colono polyps   • ENDOSCOPY  2020    [facility]   • VASECTOMY  1996     Family History   Problem Relation Age of Onset   • Stroke Mother    • Heart disease Father    • Diabetes Other        Home Medications:  Prior to Admission medications    Medication Sig Start Date End Date Taking? Authorizing Provider   allopurinol (ZYLOPRIM) 100 MG tablet Take 1 tablet by mouth Daily. 1/3/22   Vielka Mauricio MD   Emollient (CeraVe Moisturizing) cream Apply 1 application topically 2 (Two) Times a Day. 11/4/22   Vielka Mauricio MD   escitalopram (Lexapro) 5 MG tablet Take 1 tablet by mouth Daily. 9/19/22   Vielka Mauricio MD   levothyroxine (SYNTHROID, LEVOTHROID) 150 MCG tablet Take 1 tablet by mouth Daily. 1/3/22   Vielka Mauricio MD   olmesartan (Benicar) 40 MG tablet Take 1 tablet by mouth Daily. 7/14/22   Vielka Mauricio MD   pantoprazole (PROTONIX) 40 MG EC tablet take 1 tablet by mouth daily 1/3/22   Vielka Mauricio MD   Testosterone Cypionate (DEPOTESTOTERONE CYPIONATE) 200 MG/ML injection Inject 1 mL into the appropriate muscle as directed by prescriber Every 7 (Seven) Days. 10/11/22   Vielka Mauricio MD   triamcinolone (KENALOG) 0.5 % ointment Apply 1 application topically to the appropriate area as directed 2 (Two) Times a Day. 11/4/22   Vielka Mauricio MD        Social History:   Social History     Tobacco Use   • Smoking status: Never   • Smokeless tobacco: Never  "  Substance Use Topics   • Alcohol use: Yes     Comment: socially   • Drug use: Never         Review of Systems:  Review of Systems   Constitutional: Negative for chills and fever.   HENT: Negative for congestion, ear pain and sore throat.    Eyes: Negative for pain.   Respiratory: Negative for cough, chest tightness and shortness of breath.    Cardiovascular: Negative for chest pain.   Gastrointestinal: Positive for abdominal pain. Negative for diarrhea, nausea and vomiting.   Genitourinary: Negative for flank pain and hematuria.   Musculoskeletal: Positive for back pain. Negative for joint swelling.   Skin: Negative for pallor.   Neurological: Negative for seizures and headaches.   All other systems reviewed and are negative.       Physical Exam:  /75   Pulse 82   Temp 98.3 °F (36.8 °C) (Oral)   Resp 18   Ht 175.3 cm (69\")   Wt 101 kg (221 lb 12.5 oz)   SpO2 95%   BMI 32.75 kg/m²     Physical Exam  Vitals and nursing note reviewed.   Constitutional:       General: He is not in acute distress.     Appearance: Normal appearance. He is not toxic-appearing.   HENT:      Head: Normocephalic and atraumatic.      Mouth/Throat:      Mouth: Mucous membranes are moist.   Eyes:      General: No scleral icterus.     Extraocular Movements: Extraocular movements intact.      Conjunctiva/sclera: Conjunctivae normal.   Cardiovascular:      Rate and Rhythm: Normal rate and regular rhythm.      Pulses: Normal pulses.           Radial pulses are 2+ on the right side and 2+ on the left side.      Heart sounds: Normal heart sounds. No murmur heard.    No friction rub.   Pulmonary:      Effort: Pulmonary effort is normal. No respiratory distress.      Breath sounds: Normal breath sounds. No decreased breath sounds, wheezing, rhonchi or rales.   Abdominal:      General: Abdomen is flat. Bowel sounds are normal. There is no distension.      Palpations: Abdomen is soft. There is no mass.      Tenderness: There is abdominal " tenderness in the right upper quadrant and epigastric area. There is no guarding or rebound.      Comments: Moderate abdominal tenderness localized to the epigastric region.   Musculoskeletal:         General: Normal range of motion.      Cervical back: Normal range of motion and neck supple.      Right lower leg: No edema.      Left lower leg: No edema.   Skin:     General: Skin is warm and dry.      Coloration: Skin is not cyanotic.   Neurological:      Mental Status: He is alert and oriented to person, place, and time. Mental status is at baseline.   Psychiatric:         Attention and Perception: Attention and perception normal.         Mood and Affect: Mood normal.                Medications in the Emergency Department:  Medications   sodium chloride 0.9 % flush 10 mL (has no administration in time range)   sodium chloride 0.9 % bolus 1,000 mL (1,000 mL Intravenous New Bag 11/12/22 1939)   HYDROmorphone (DILAUDID) injection 0.5 mg (0.5 mg Intravenous Given 11/12/22 1728)   iopamidol (ISOVUE-370) 76 % injection 100 mL (100 mL Intravenous Given 11/12/22 1846)        Labs  Lab Results (last 24 hours)     Procedure Component Value Units Date/Time    CBC & Differential [[id number]]  (Abnormal) Collected: 11/12/22 1453    Specimen: Blood Updated: 11/12/22 1512    Narrative:      The following orders were created for panel order CBC & Differential.  Procedure                               Abnormality         Status                     ---------                               -----------         ------                     CBC Auto Differential[[id number]]        Abnormal            Final result                 Please view results for these tests on the individual orders.    Lipase [[id number]]  (Abnormal) Collected: 11/12/22 1453    Specimen: Blood Updated: 11/12/22 1536     Lipase 68 U/L     Lactic Acid, Plasma [[id number]]  (Normal) Collected: 11/12/22 1453    Specimen: Blood Updated: 11/12/22 1528     Lactate 2.0 mmol/L      CBC Auto Differential [[id number]]  (Abnormal) Collected: 11/12/22 1453    Specimen: Blood Updated: 11/12/22 1512     WBC 9.37 10*3/mm3      RBC 5.07 10*6/mm3      Hemoglobin 16.9 g/dL      Hematocrit 49.0 %      MCV 96.6 fL      MCH 33.3 pg      MCHC 34.5 g/dL      RDW 12.7 %      RDW-SD 45.1 fl      MPV 10.1 fL      Platelets 198 10*3/mm3      Neutrophil % 73.6 %      Lymphocyte % 16.2 %      Monocyte % 7.9 %      Eosinophil % 1.6 %      Basophil % 0.3 %      Immature Grans % 0.4 %      Neutrophils, Absolute 6.89 10*3/mm3      Lymphocytes, Absolute 1.52 10*3/mm3      Monocytes, Absolute 0.74 10*3/mm3      Eosinophils, Absolute 0.15 10*3/mm3      Basophils, Absolute 0.03 10*3/mm3      Immature Grans, Absolute 0.04 10*3/mm3      nRBC 0.0 /100 WBC     Comprehensive Metabolic Panel [[id number]]  (Abnormal) Collected: 11/12/22 1550    Specimen: Blood Updated: 11/12/22 1622     Glucose 91 mg/dL      BUN 22 mg/dL      Creatinine 1.35 mg/dL      Sodium 140 mmol/L      Potassium 4.4 mmol/L      Comment: Slight hemolysis detected by analyzer. Results may be affected.        Chloride 102 mmol/L      CO2 23.7 mmol/L      Calcium 9.1 mg/dL      Total Protein 7.2 g/dL      Albumin 4.50 g/dL      ALT (SGPT) 46 U/L      AST (SGOT) 36 U/L      Comment: Slight hemolysis detected by analyzer. Results may be affected.        Alkaline Phosphatase 53 U/L      Total Bilirubin 0.3 mg/dL      Globulin 2.7 gm/dL      A/G Ratio 1.7 g/dL      BUN/Creatinine Ratio 16.3     Anion Gap 14.3 mmol/L      eGFR 61.6 mL/min/1.73      Comment: National Kidney Foundation and American Society of Nephrology (ASN) Task Force recommended calculation based on the Chronic Kidney Disease Epidemiology Collaboration (CKD-EPI) equation refit without adjustment for race.       Narrative:      GFR Normal >60  Chronic Kidney Disease <60  Kidney Failure <15      Urinalysis With Microscopic If Indicated (No Culture) - Urine, Clean Catch [[id number]]  (Abnormal)  Collected: 11/12/22 1740    Specimen: Urine, Clean Catch Updated: 11/12/22 1801     Color, UA Yellow     Appearance, UA Clear     pH, UA 5.5     Specific Gravity, UA 1.014     Glucose, UA Negative     Ketones, UA 15 mg/dL (1+)     Bilirubin, UA Negative     Blood, UA Negative     Protein, UA Negative     Leuk Esterase, UA Negative     Nitrite, UA Negative     Urobilinogen, UA 0.2 E.U./dL    Narrative:      Urine microscopic not indicated.           Imaging:  CT Abdomen Pelvis With Contrast    Result Date: 11/12/2022  PROCEDURE: CT ABDOMEN PELVIS W CONTRAST  COMPARISON: None.  INDICATIONS: 57-YEAR-OLD MALE W/ GENERALIZED ABDOMINAL & BACK PAIN; NO H/O RECENT TRAUMA IS PROVIDED.  TECHNIQUE: After obtaining the patient's consent, 814 CT images were created with non-ionic intravenous contrast material.  Oral contrast agent was administered for the study.  The oral contrast preparation is incomplete.  PROTOCOL:   Standard imaging protocol performed    RADIATION:   Total DLP: 1,109.4 mGy*cm   Automated exposure control was utilized to minimize radiation dose. CONTRAST: 93 mL Isovue 370 I.V. LABS:   eGFR: >60 mL/min/1.73m^2  FINDINGS: There is diffuse hepatic steatosis with hepatomegaly.  No splenomegaly is appreciated.  A tiny hiatal hernia is possible.  Minimal atelectasis and/or fibrosis may involve the lung bases.  No acute infiltrate is seen.  There are coronary artery calcifications.  Atherosclerotic changes involve the aortoiliac arterial system without aneurysmal dilatation.  No acute intraperitoneal or retroperitoneal hemorrhage is identified.  The main portal vein is patent.  Probably no cardiac enlargement.  No adrenal mass.  No acute pancreatitis.  No gallstones or acute cholecystitis.  No pneumoperitoneum or pneumatosis.  Colonic diverticula are seen without acute diverticulitis.  No acute appendicitis.  No mechanical bowel obstruction.  No portal or mesenteric venous gas is suggested.  No enhanced CT evidence  of pelvicaliceal renal/ureteral stones.  No hydronephrosis or obstructive uropathy due to a ureteral calculus.  No acute pyelonephritis.  There is diffuse prostatomegaly with minimal extrinsic deformity of the base of the urinary bladder.  The maximum craniocaudal dimension of the prostate gland is roughly estimated at 5.6 cm.  No urinary bladder wall thickening or urinary bladder calculi.  Degenerative changes involve the imaged spine, especially at L5-S1 and L4-5.  No acute fracture or aggressive osseous lesion is suggested.  Degenerative changes also involve the bilateral sacroiliac joints.  There may be mild pubic osteitis.  No suspicious renal or pancreatic mass.  No definite enlarged lymph nodes are seen.  There are small scattered bilateral inguinal lymph nodes, which are nonspecific.  A tiny fat-containing umbilical hernia is seen.  It does not contain bowel.        No acute findings are appreciated by enhanced CT examination.  Please see above comments for further detail.     Please note that portions of this note were completed with a voice recognition program.  KAVEH COX JR, MD       Electronically Signed and Approved By: KAVEH COX JR, MD on 11/12/2022 at 19:13                Procedures:  Procedures    Progress  ED Course as of 11/12/22 1949   Sat Nov 12, 2022   1709 The patient was seen and examined by Lizy douglas APRN, while in triage. Orders placed. Patient is awaiting disposition.   [AR]      ED Course User Index  [AR] Lizy Manuel APRN                            The patient was initially evaluated in the triage area where orders were placed. The patient was later dispositioned by Patrice Rai DO.      The patient was advised to stay for completion of workup which includes but is not limited to communication of labs and radiological results, reassessment and plan. The patient was advised that leaving prior to disposition by a provider could result in critical findings that are  "not communicated to the patient.     Medical Decision Making:  [facility]   The patient was seen and evaluated the ED by me.  The above history and physical examination was performed as document.  Diagnostic data was obtained peer results reviewed.  Patient's laboratory and radiographic studies were unremarkable.  Patient the patient's history and reproducibility of the pain with certain movements I feel this pain is more likely muscle skeletal.  Upon further questioning the patient I find that he has been doing CrossFit but he denies any known injury.  His wife also later asked him if he had picked up a deer and he actually admits to picking up to tears that \"were not that heavy\".  Patient is safe for discharge home with outpatient treatment follow-up.  I stressed to him the need for minimizing any exertional activities until his pain is resolved.      The following orders were placed after triage and evaluation:  Orders Placed This Encounter   Procedures   • CT Abdomen Pelvis With Contrast   • [place] Draw   • Comprehensive Metabolic Panel   • Lipase   • Urinalysis With Microscopic If Indicated (No Culture) - Urine, Clean Catch   • Lactic Acid, Plasma   • CBC Auto Differential   • NPO Diet NPO Type: Strict NPO   • Undress & Gown   • Insert Peripheral IV   • CBC & Differential   • Green Top (Gel)   • Lavender Top   • Gold Top - SST   • Light Blue Top       Final diagnoses:   Strain of abdominal wall, initial encounter   Dehydration          Disposition:  ED Disposition     ED Disposition   Discharge    Condition   Stable    Comment   --             This medical record created using voice recognition software.    Documentation assistance provided by Kenyetta Rutherford acting as scribe for Patrice Rai DO. Information recorded by the scribe was done at my direction and has been verified and validated by me.        Kenyetta Rutherford  11/12/22 1735       Kenyetta Rutherford  11/12/22 1755       Patrice Rai DO  11/12/22 1949    "

## 2022-11-13 NOTE — DISCHARGE INSTRUCTIONS
Avoid any strenuous activities or heavy lifting.  Do not lift anything heavier than 10 pounds until symptoms have completely resolved.  Take prescriptions as prescribed.  Use caution when taking the hydrocodone and/or Flexeril as this may cause drowsiness.  Do not operate machinery or drive.  Also consider taking a stool softener to avoid constipation when taking the hydrocodone.  You may also take 80 mg of Motrin 3 times a day with food as needed for pain.  Follow your primary care provider in 1 week if symptoms or not improving.  Return to the ER for fever greater than 101, intractable vomiting, diarrhea, or any other concerns issues that may arise.

## 2022-12-12 RX ORDER — OLMESARTAN MEDOXOMIL 40 MG/1
40 TABLET ORAL DAILY
Qty: 90 TABLET | Refills: 1 | Status: SHIPPED | OUTPATIENT
Start: 2022-12-12

## 2022-12-12 RX ORDER — ESCITALOPRAM OXALATE 5 MG/1
5 TABLET ORAL DAILY
Qty: 90 TABLET | Refills: 0 | Status: SHIPPED | OUTPATIENT
Start: 2022-12-12 | End: 2023-03-07 | Stop reason: SDUPTHER

## 2022-12-12 RX ORDER — LEVOTHYROXINE SODIUM 0.15 MG/1
150 TABLET ORAL DAILY
Qty: 90 TABLET | Refills: 3 | Status: SHIPPED | OUTPATIENT
Start: 2022-12-12

## 2023-01-12 ENCOUNTER — PRIOR AUTHORIZATION (OUTPATIENT)
Dept: INTERNAL MEDICINE | Facility: CLINIC | Age: 58
End: 2023-01-12
Payer: COMMERCIAL

## 2023-01-17 NOTE — TELEPHONE ENCOUNTER
I really don't think this should exclude him. Can you look at this.. can you help Marsha push this through or figure out how to switch it.

## 2023-01-19 ENCOUNTER — PATIENT MESSAGE (OUTPATIENT)
Dept: INTERNAL MEDICINE | Facility: CLINIC | Age: 58
End: 2023-01-19
Payer: COMMERCIAL

## 2023-01-21 NOTE — TELEPHONE ENCOUNTER
Marsha,  Is there a way to add the information he added here to his request for medication?  Thanks

## 2023-01-31 ENCOUNTER — OFFICE VISIT (OUTPATIENT)
Dept: ORTHOPEDIC SURGERY | Facility: CLINIC | Age: 58
End: 2023-01-31
Payer: COMMERCIAL

## 2023-01-31 VITALS — BODY MASS INDEX: 31.84 KG/M2 | HEART RATE: 72 BPM | WEIGHT: 215 LBS | HEIGHT: 69 IN | OXYGEN SATURATION: 98 %

## 2023-01-31 DIAGNOSIS — M75.52 BURSITIS OF LEFT SHOULDER: ICD-10-CM

## 2023-01-31 DIAGNOSIS — M25.512 LEFT SHOULDER PAIN, UNSPECIFIED CHRONICITY: Primary | ICD-10-CM

## 2023-01-31 PROCEDURE — 99203 OFFICE O/P NEW LOW 30 MIN: CPT | Performed by: ORTHOPAEDIC SURGERY

## 2023-01-31 PROCEDURE — 20610 DRAIN/INJ JOINT/BURSA W/O US: CPT | Performed by: ORTHOPAEDIC SURGERY

## 2023-01-31 RX ORDER — LIDOCAINE HYDROCHLORIDE 10 MG/ML
5 INJECTION, SOLUTION INFILTRATION; PERINEURAL
Status: COMPLETED | OUTPATIENT
Start: 2023-01-31 | End: 2023-01-31

## 2023-01-31 RX ORDER — TRIAMCINOLONE ACETONIDE 40 MG/ML
40 INJECTION, SUSPENSION INTRA-ARTICULAR; INTRAMUSCULAR
Status: COMPLETED | OUTPATIENT
Start: 2023-01-31 | End: 2023-01-31

## 2023-01-31 RX ORDER — DICLOFENAC SODIUM 75 MG/1
75 TABLET, DELAYED RELEASE ORAL 2 TIMES DAILY
Qty: 60 TABLET | Refills: 1 | Status: SHIPPED | OUTPATIENT
Start: 2023-01-31

## 2023-01-31 RX ADMIN — TRIAMCINOLONE ACETONIDE 40 MG: 40 INJECTION, SUSPENSION INTRA-ARTICULAR; INTRAMUSCULAR at 13:51

## 2023-01-31 RX ADMIN — LIDOCAINE HYDROCHLORIDE 5 ML: 10 INJECTION, SOLUTION INFILTRATION; PERINEURAL at 13:51

## 2023-01-31 NOTE — PROGRESS NOTES
"Chief Complaint  Initial Evaluation of the Left Shoulder     Subjective      Jose Louie presents to [facility] for initial evaluation of the left shoulder. He has had pain for 2-3 weeks especially with rotation and abduction/adduction.  He has had no treatment except Aleve and patches.  He has had no recent injury.     No Known Allergies     Social History     Socioeconomic History   • Marital status:    Tobacco Use   • Smoking status: Never   • Smokeless tobacco: Never   Substance and Sexual Activity   • Alcohol use: Yes     Comment: socially   • Drug use: Never   • Sexual activity: Defer        Review of Systems     Objective   Vital Signs:   Pulse 72   Ht 175.3 cm (69\")   Wt 97.5 kg (215 lb)   SpO2 98%   BMI 31.75 kg/m²       Physical Exam  Constitutional:       Appearance: Normal appearance. Patient is well-developed and normal weight.   HENT:      Head: Normocephalic.      Right Ear: Hearing and external ear normal.      Left Ear: Hearing and external ear normal.      Nose: Nose normal.   Eyes:      Conjunctiva/sclera: Conjunctivae normal.   Cardiovascular:      Rate and Rhythm: Normal rate.   Pulmonary:      Effort: Pulmonary effort is normal.      Breath sounds: No wheezing or rales.   Abdominal:      Palpations: Abdomen is soft.      Tenderness: There is no abdominal tenderness.   Musculoskeletal:      Cervical back: Normal range of motion.   Skin:     Findings: No rash.   Neurological:      Mental Status: Patient is alert and oriented to person, place, and time.   Psychiatric:         Mood and Affect: Mood and affect normal.         Judgment: Judgment normal.       Ortho Exam      LEFT SHOULDER Forward flexion 140 . Abduction 90 with pain. External rotation 50 with pain. Internal rotation to L5. Positive Cross body adduction. Supraspinatus strength 3/5. Infraspinatus Strength 3/5. Infrared subscap 3/5. Positive Agrawal. Positive Neer. Negative " Apprehension. Negative Lift off. (Negative Obriens. Sensation intact to light touch, median, radial, ulnar nerve. Positive AIN, PIN, ulnar nerve motor. Positive pulses. Positive Impingement signs. Good strength in triceps, biceps, deltoid, wrist extensors and wrist flexors.         Large Joint Arthrocentesis  Date/Time: 1/31/2023 1:51 PM  Consent given by: patient  Site marked: site marked  Timeout: Immediately prior to procedure a time out was called to verify the correct patient, procedure, equipment, support staff and site/side marked as required   Supporting Documentation  Indications: pain   Procedure Details  Location: shoulder (LEFT SHOULDER) -   Needle gauge: 21G.  Medications administered: 40 mg triamcinolone acetonide 40 MG/ML; 5 mL lidocaine 1 %  Patient tolerance: patient tolerated the procedure well with no immediate complications            Imaging Results (Most Recent)     Procedure Component Value Units Date/Time    XR Scapula Left [[id number]] Resulted: 01/31/23 1331     Updated: 01/31/23 1332           Result Review    X-Ray Report:  Left scapula X-Ray  Indication: Evaluation of the left scapula  AP/Lateral view(s)  Findings: Mild to moderate arthritis.   Prior studies available for comparison: No               Assessment and Plan     Diagnoses and all orders for this visit:    1. Left shoulder pain, unspecified chronicity (Primary)  -     XR Scapula Left    2. Bursitis of left shoulder        Discussed the treatment plan with the patient. I reviewed the X-rays that were obtained today with the patient. Discussed the risks and benefits of conservative measures.  The patient expressed understanding and wished to proceed with a left shoulder steroid injection.     Call or return if worsening symptoms.    Follow Up     PRN    Patient was given instructions and counseling regarding his condition or for health maintenance advice. Please see specific information pulled into the AVS if appropriate.      Scribed for Elver Richards MD by Maricarmen Zaman MA.  01/31/23   13:34 EST    I have personally performed the services described in this document as scribed by the above individual and it is both accurate and complete. Elver Richards MD 01/31/23

## 2023-03-01 DIAGNOSIS — E29.1 HYPOGONADISM IN MALE: ICD-10-CM

## 2023-03-01 RX ORDER — TESTOSTERONE CYPIONATE 200 MG/ML
200 INJECTION, SOLUTION INTRAMUSCULAR
Qty: 4 ML | Refills: 2 | Status: CANCELLED | OUTPATIENT
Start: 2023-03-01

## 2023-03-01 RX ORDER — ESCITALOPRAM OXALATE 5 MG/1
5 TABLET ORAL DAILY
Qty: 90 TABLET | Refills: 0 | Status: CANCELLED | OUTPATIENT
Start: 2023-03-01

## 2023-03-01 RX ORDER — PANTOPRAZOLE SODIUM 40 MG/1
TABLET, DELAYED RELEASE ORAL
Qty: 270 TABLET | Refills: 2 | Status: CANCELLED | OUTPATIENT
Start: 2023-03-01

## 2023-03-01 RX ORDER — ALLOPURINOL 100 MG/1
100 TABLET ORAL DAILY
Qty: 90 TABLET | Refills: 3 | Status: CANCELLED | OUTPATIENT
Start: 2023-03-01

## 2023-03-03 ENCOUNTER — TELEPHONE (OUTPATIENT)
Dept: ORTHOPEDIC SURGERY | Facility: CLINIC | Age: 58
End: 2023-03-03
Payer: COMMERCIAL

## 2023-03-03 DIAGNOSIS — M25.512 LEFT SHOULDER PAIN, UNSPECIFIED CHRONICITY: Primary | ICD-10-CM

## 2023-03-06 RX ORDER — PANTOPRAZOLE SODIUM 40 MG/1
TABLET, DELAYED RELEASE ORAL
Qty: 270 TABLET | Refills: 2 | Status: SHIPPED | OUTPATIENT
Start: 2023-03-06

## 2023-03-07 ENCOUNTER — HOSPITAL ENCOUNTER (OUTPATIENT)
Dept: CARDIOLOGY | Facility: HOSPITAL | Age: 58
Discharge: HOME | End: 2023-03-07
Admitting: PHYSICIAN ASSISTANT
Payer: COMMERCIAL

## 2023-03-07 ENCOUNTER — OFFICE VISIT (OUTPATIENT)
Dept: INTERNAL MEDICINE | Facility: CLINIC | Age: 58
End: 2023-03-07
Payer: COMMERCIAL

## 2023-03-07 VITALS
DIASTOLIC BLOOD PRESSURE: 68 MMHG | HEART RATE: 66 BPM | BODY MASS INDEX: 32.7 KG/M2 | OXYGEN SATURATION: 95 % | SYSTOLIC BLOOD PRESSURE: 132 MMHG | HEIGHT: 69 IN | WEIGHT: 220.8 LBS | TEMPERATURE: 97.8 F

## 2023-03-07 DIAGNOSIS — I80.9 PHLEBITIS: Primary | ICD-10-CM

## 2023-03-07 LAB
BH CV LOW VAS LEFT GREATER SAPH AK VESSEL: 1
BH CV LOW VAS LEFT GREATER SAPH BK VESSEL: 1
BH CV LOW VAS LEFT SAPHENOFEMORAL JUNCTION SPONT: 1
BH CV LOWER VASCULAR LEFT COMMON FEMORAL AUGMENT: NORMAL
BH CV LOWER VASCULAR LEFT COMMON FEMORAL COMPETENT: NORMAL
BH CV LOWER VASCULAR LEFT COMMON FEMORAL COMPRESS: NORMAL
BH CV LOWER VASCULAR LEFT COMMON FEMORAL PHASIC: NORMAL
BH CV LOWER VASCULAR LEFT COMMON FEMORAL SPONT: NORMAL
BH CV LOWER VASCULAR LEFT DISTAL FEMORAL COMPRESS: NORMAL
BH CV LOWER VASCULAR LEFT GASTRONEMIUS COMPRESS: NORMAL
BH CV LOWER VASCULAR LEFT GREATER SAPH AK COMPRESS: NORMAL
BH CV LOWER VASCULAR LEFT GREATER SAPH AK THROMBUS: NORMAL
BH CV LOWER VASCULAR LEFT GREATER SAPH BK COMPRESS: NORMAL
BH CV LOWER VASCULAR LEFT GREATER SAPH BK THROMBUS: NORMAL
BH CV LOWER VASCULAR LEFT LESSER SAPH COMPRESS: NORMAL
BH CV LOWER VASCULAR LEFT MID FEMORAL AUGMENT: NORMAL
BH CV LOWER VASCULAR LEFT MID FEMORAL COMPETENT: NORMAL
BH CV LOWER VASCULAR LEFT MID FEMORAL COMPRESS: NORMAL
BH CV LOWER VASCULAR LEFT MID FEMORAL PHASIC: NORMAL
BH CV LOWER VASCULAR LEFT MID FEMORAL SPONT: NORMAL
BH CV LOWER VASCULAR LEFT PERONEAL COMPRESS: NORMAL
BH CV LOWER VASCULAR LEFT POPLITEAL AUGMENT: NORMAL
BH CV LOWER VASCULAR LEFT POPLITEAL COMPETENT: NORMAL
BH CV LOWER VASCULAR LEFT POPLITEAL COMPRESS: NORMAL
BH CV LOWER VASCULAR LEFT POPLITEAL PHASIC: NORMAL
BH CV LOWER VASCULAR LEFT POPLITEAL SPONT: NORMAL
BH CV LOWER VASCULAR LEFT POSTERIOR TIBIAL COMPRESS: NORMAL
BH CV LOWER VASCULAR LEFT PROXIMAL FEMORAL COMPRESS: NORMAL
BH CV LOWER VASCULAR LEFT SAPHENOFEMORAL JUNCTION COMPETENT: NORMAL
BH CV LOWER VASCULAR LEFT SAPHENOFEMORAL JUNCTION COMPRESS: NORMAL
BH CV LOWER VASCULAR RIGHT COMMON FEMORAL AUGMENT: NORMAL
BH CV LOWER VASCULAR RIGHT COMMON FEMORAL COMPETENT: NORMAL
BH CV LOWER VASCULAR RIGHT COMMON FEMORAL COMPRESS: NORMAL
BH CV LOWER VASCULAR RIGHT COMMON FEMORAL PHASIC: NORMAL
BH CV LOWER VASCULAR RIGHT COMMON FEMORAL SPONT: NORMAL
BH CV VAS PRELIMINARY FINDINGS SCRIPTING: 1
MAXIMAL PREDICTED HEART RATE: 164 BPM
STRESS TARGET HR: 139 BPM

## 2023-03-07 PROCEDURE — 93971 EXTREMITY STUDY: CPT | Performed by: SURGERY

## 2023-03-07 PROCEDURE — 99213 OFFICE O/P EST LOW 20 MIN: CPT | Performed by: PHYSICIAN ASSISTANT

## 2023-03-07 PROCEDURE — 93971 EXTREMITY STUDY: CPT

## 2023-03-07 RX ORDER — ALLOPURINOL 100 MG/1
100 TABLET ORAL DAILY
Qty: 90 TABLET | Refills: 3 | Status: SHIPPED | OUTPATIENT
Start: 2023-03-07

## 2023-03-07 RX ORDER — CEPHALEXIN 500 MG/1
500 CAPSULE ORAL 3 TIMES DAILY
Qty: 30 CAPSULE | Refills: 0 | Status: SHIPPED | OUTPATIENT
Start: 2023-03-07 | End: 2023-03-23

## 2023-03-07 RX ORDER — ESCITALOPRAM OXALATE 5 MG/1
5 TABLET ORAL DAILY
Qty: 90 TABLET | Refills: 0 | Status: SHIPPED | OUTPATIENT
Start: 2023-03-07

## 2023-03-07 NOTE — ASSESSMENT & PLAN NOTE
Will get venous duplex to check for clotting.  Physical exam findings without concern of infection. Would recommend continuing Voltaren tablets at this time as well as compression stockings.  If symptoms persist or worsen could consider vascular referral.

## 2023-03-07 NOTE — PROGRESS NOTES
"Chief Complaint  Leg Pain (Redness, swelling, hot to touch )    Subjective          Jose Louie presents to [facility]  History of Present Illness  Varicose veins-patient states he has had a chronic varicose vein in his left lower extremity.  It has not bothered him much previously but over the last few days he has noticed the veins seems to be extending and causing him more pain and discomfort her especially as it is moving up his leg on the medial aspect.  Last night he noticed some redness, warmth and tenderness to the touch.  He states he is very active and exercises many times throughout the week.  He has not had any recent travel.  He is a non-smoker.    Patient needing refill of medications as well      Objective   Vital Signs:   /68   Pulse 66   Temp 97.8 °F (36.6 °C)   Ht 175.3 cm (69\")   Wt 100 kg (220 lb 12.8 oz)   SpO2 95%   BMI 32.61 kg/m²     Physical Exam  Vitals reviewed.   Constitutional:       Appearance: Normal appearance. He is well-developed.   HENT:      Head: Normocephalic and atraumatic.   Eyes:      Conjunctiva/sclera: Conjunctivae normal.      Pupils: Pupils are equal, round, and reactive to light.   Cardiovascular:      Rate and Rhythm: Normal rate and regular rhythm.      Heart sounds: No murmur heard.    No friction rub. No gallop.   Pulmonary:      Effort: Pulmonary effort is normal.      Breath sounds: Normal breath sounds. No wheezing or rhonchi.   Musculoskeletal:      Comments: Varicose vein of left medial calf following saphenous vein of left leg   Skin:     General: Skin is warm and dry.   Neurological:      Mental Status: He is alert and oriented to person, place, and time.      Cranial Nerves: No cranial nerve deficit.   Psychiatric:         Mood and Affect: Mood and affect normal.         Behavior: Behavior normal.         Thought Content: Thought content normal.         Judgment: Judgment normal.      "   Result Review :          Procedures      Assessment and Plan    Diagnoses and all orders for this visit:    1. Phlebitis (Primary)  Assessment & Plan:  Will get venous duplex to check for clotting.  Physical exam findings without concern of infection. Would recommend continuing Voltaren tablets at this time as well as compression stockings.  If symptoms persist or worsen could consider vascular referral.     Orders:  -     Cancel: US Venous Doppler Lower Extremity Left (duplex)  -     Duplex Venous Lower Extremity - Left CAR    Other orders  -     allopurinol (ZYLOPRIM) 100 MG tablet; Take 1 tablet by mouth Daily.  Dispense: 90 tablet; Refill: 3  -     escitalopram (Lexapro) 5 MG tablet; Take 1 tablet by mouth Daily.  Dispense: 90 tablet; Refill: 0            Follow Up   No follow-ups on file.  Patient was given instructions and counseling regarding his condition or for health maintenance advice. Please see specific information pulled into the AVS if appropriate.

## 2023-03-17 ENCOUNTER — OFFICE VISIT (OUTPATIENT)
Dept: ORTHOPEDIC SURGERY | Facility: CLINIC | Age: 58
End: 2023-03-17
Payer: COMMERCIAL

## 2023-03-17 VITALS — WEIGHT: 220 LBS | BODY MASS INDEX: 32.58 KG/M2 | HEIGHT: 69 IN

## 2023-03-17 DIAGNOSIS — M75.42 IMPINGEMENT SYNDROME OF LEFT SHOULDER: Primary | ICD-10-CM

## 2023-03-17 PROCEDURE — 99213 OFFICE O/P EST LOW 20 MIN: CPT | Performed by: ORTHOPAEDIC SURGERY

## 2023-03-17 NOTE — TELEPHONE ENCOUNTER
Pa denied    We see that you have another illness (obstructive sleep apnea). Using this drug with this other illness is not advised. Medications that are not medically necessary are an exclusion under your plan benefits and are not covered.    73 yo M w/ h/o DVTs, CAD, ALIYA, type II DM, hypertension, and paroxysmal atrial fibrillation (on Sotalol and eliquis) who presented 3/16/2023 for Afib ablation.    Procedure was uncomplicated. Telemetry from overnight reviewed and uneventful. Bilateral groin sites assessed and found to be free of hematoma, bleeding and swelling. He will continue all medications, as prescribed. He demonstrates a good understanding of discharge instructions. He has a follow up appointment with  75 yo M w/ h/o DVTs, CAD, ALIYA, type II DM, hypertension, and paroxysmal atrial fibrillation (on Sotalol and eliquis) who presented 3/16/2023 for Afib ablation.    Procedure was uncomplicated. Telemetry from overnight reviewed and uneventful. Bilateral groin sites assessed and found to be free of hematoma, bleeding and swelling. He will continue all medications, as prescribed. He demonstrates a good understanding of discharge instructions. He has a follow up appointment with Dr. Forbes in 5/17/23 @1:30pm 73 yo M w/ h/o DVTs, CAD, ALIYA, type II DM, hypertension, and paroxysmal atrial fibrillation (on Sotalol and eliquis) who presented 3/16/2023 for Afib ablation.    Procedure was uncomplicated. Telemetry from overnight reviewed and uneventful. Bilateral groin sites assessed and found to be free of hematoma, bleeding and swelling. He will continue all medications, as prescribed. He demonstrates a good understanding of discharge instructions. He has a follow up appointment with Dr. Forbes in 4/17/23 @1:30pm

## 2023-03-17 NOTE — PROGRESS NOTES
"Chief Complaint  Follow-up of the Left Shoulder     Subjective      Jose Louie presents to [facility] for follow up of the left shoulder. He has had pain since January.  weeks especially with rotation and abduction/adduction.  He has had no treatment except Aleve and patches.  He had an injury 1-2 weeks ago going to the gym.  He had an injection in January and that lasted a couple of weeks.  He is here for MRI results.     No Known Allergies     Social History     Socioeconomic History   • Marital status:    Tobacco Use   • Smoking status: Never   • Smokeless tobacco: Never   Vaping Use   • Vaping Use: Never used   Substance and Sexual Activity   • Alcohol use: Yes     Comment: socially   • Drug use: Never   • Sexual activity: Defer        Review of Systems     Objective   Vital Signs:   Ht 175.3 cm (69\")   Wt 99.8 kg (220 lb)   BMI 32.49 kg/m²       Physical Exam  Constitutional:       Appearance: Normal appearance. Patient is well-developed and normal weight.   HENT:      Head: Normocephalic.      Right Ear: Hearing and external ear normal.      Left Ear: Hearing and external ear normal.      Nose: Nose normal.   Eyes:      Conjunctiva/sclera: Conjunctivae normal.   Cardiovascular:      Rate and Rhythm: Normal rate.   Pulmonary:      Effort: Pulmonary effort is normal.      Breath sounds: No wheezing or rales.   Abdominal:      Palpations: Abdomen is soft.      Tenderness: There is no abdominal tenderness.   Musculoskeletal:      Cervical back: Normal range of motion.   Skin:     Findings: No rash.   Neurological:      Mental Status: Patient is alert and oriented to person, place, and time.   Psychiatric:         Mood and Affect: Mood and affect normal.         Judgment: Judgment normal.       Ortho Exam      LEFT SHOULDER Forward flexion 140 . Abduction 90 with pain. External rotation 50 with pain. Internal rotation to L5. Positive Cross body adduction. " Supraspinatus strength 3/5. Infraspinatus Strength 3/5. Infrared subscap 3/5. Positive Agrawal. Positive Neer. Negative Apprehension. Negative Lift off. (Negative Obriens. Sensation intact to light touch, median, radial, ulnar nerve. Positive AIN, PIN, ulnar nerve motor. Positive pulses. Positive Impingement signs. Good strength in triceps, biceps, deltoid, wrist extensors and wrist flexors.       Procedures      Imaging Results (Most Recent)     None           Result Review :   MRI [facility]   3/13/23  IMPRESSION:  1. Mild to moderate supraspinatus and infraspinatus tendinopathy. No rotator cuff tear. 2. Intact long head biceps tendon and glenoid   labrum. 3. Mild to moderate AC joint arthrosis producing mild mass effect on the supraspinatus outlet. 4. Mild inflammation of the   subacromial/subdeltoid bursa.          Assessment and Plan     Diagnoses and all orders for this visit:    1. Impingement syndrome of left shoulder (Primary)        Discussed the treatment plan with the patient. I reviewed the MRI results with the patient. Discussed the treatment options with the patient, operative vs non-operative. The patient expressed understanding and wished to proceed with conservative measures. Prescribed physical therapy.  Will call back to schedule left shoulder arthroscopy.     Call or return if worsening symptoms.    Follow Up     PRN      Patient was given instructions and counseling regarding his condition or for health maintenance advice. Please see specific information pulled into the AVS if appropriate.     Scribed for Elver Richards MD by Maricarmen Zaman MA.  03/17/23   09:44 EDT    I have personally performed the services described in this document as scribed by the above individual and it is both accurate and complete. Elver Richards MD 03/17/23

## 2023-03-31 DIAGNOSIS — E29.1 HYPOGONADISM IN MALE: ICD-10-CM

## 2023-03-31 RX ORDER — TESTOSTERONE CYPIONATE 200 MG/ML
200 INJECTION, SOLUTION INTRAMUSCULAR
Qty: 4 ML | Refills: 2 | Status: SHIPPED | OUTPATIENT
Start: 2023-03-31

## 2023-03-31 NOTE — TELEPHONE ENCOUNTER
Last follow up visit date: 11/4/22    Last urine drug screen date: 11/4/22    Last consent/contract date: 11/4/22    Last rx: 10/11/22

## 2023-03-31 NOTE — TELEPHONE ENCOUNTER
Caller: Jose Louie    Relationship: Self    Best call back number: [phone]    Requested Prescriptions:   Requested Prescriptions     Pending Prescriptions Disp Refills   • Testosterone Cypionate (DEPOTESTOTERONE CYPIONATE) 200 MG/ML injection 4 mL 2     Sig: Inject 1 mL into the appropriate muscle as directed by prescriber Every 7 (Seven) Days.        Pharmacy where request should be sent: [facility]     Last office visit with prescribing clinician: 11/4/2022   Last telemedicine visit with prescribing clinician: 5/5/2023   Next office visit with prescribing clinician: 5/5/2023       Does the patient have less than a 3 day supply:  [x] Yes  [] No    Would you like a call back once the refill request has been completed: [] Yes [x] No    If the office needs to give you a call back, can they leave a voicemail: [] Yes [x] No    Kumar Singh Rep   03/31/23 11:22 EDT

## 2023-04-06 ENCOUNTER — TELEPHONE (OUTPATIENT)
Dept: INTERNAL MEDICINE | Facility: CLINIC | Age: 58
End: 2023-04-06
Payer: COMMERCIAL

## 2023-04-06 NOTE — TELEPHONE ENCOUNTER
Caller: Jose Louie    Relationship: Self    Best call back number: [phone]    What is the best time to reach you: ANY     Who are you requesting to speak with (clinical staff, provider,  specific staff member): CLINICAL     What was the call regarding: PATIENT WAS CALLING TO LET PCP KNOW Testosterone Cypionate (DEPOTESTOTERONE CYPIONATE) 200 MG/ML injection IS NEEDING A PA BEFORE IT CAN BE FILLED PLEASE ADVISE     Do you require a callback: YES

## 2023-04-07 NOTE — TELEPHONE ENCOUNTER
PA approved for testosterone.    Approvedtoday  PA Case: [id number], Status: Approved, Coverage Starts on: 4/7/2023 12:00:00 AM, Coverage Ends on: 4/6/2024 12:00:00 AM    [employer] message sent to pt.

## 2023-04-18 ENCOUNTER — PREP FOR SURGERY (OUTPATIENT)
Dept: OTHER | Facility: HOSPITAL | Age: 58
End: 2023-04-18
Payer: COMMERCIAL

## 2023-04-18 ENCOUNTER — TELEPHONE (OUTPATIENT)
Dept: ORTHOPEDIC SURGERY | Facility: CLINIC | Age: 58
End: 2023-04-18
Payer: COMMERCIAL

## 2023-04-18 DIAGNOSIS — M75.42 IMPINGEMENT SYNDROME OF LEFT SHOULDER: Primary | ICD-10-CM

## 2023-04-18 RX ORDER — CEFAZOLIN SODIUM 2 G/100ML
2 INJECTION, SOLUTION INTRAVENOUS ONCE
OUTPATIENT
Start: 2023-04-18 | End: 2023-04-18

## 2023-04-18 RX ORDER — CEFAZOLIN SODIUM IN 0.9 % NACL 3 G/100 ML
3 INTRAVENOUS SOLUTION, PIGGYBACK (ML) INTRAVENOUS ONCE
OUTPATIENT
Start: 2023-04-18 | End: 2023-04-18

## 2023-04-18 NOTE — TELEPHONE ENCOUNTER
ATTEMPTED TO CALL PATIENT TO SCHEDULE SURGERY FOR HIS LEFT SHOULDER. NO ANSWER, LVM FOR PATIENT TO RETURN MY CALL ON MY DIRECT LINE.

## 2023-04-20 DIAGNOSIS — M75.42 IMPINGEMENT SYNDROME OF LEFT SHOULDER: Primary | ICD-10-CM

## 2023-04-20 DIAGNOSIS — M25.512 LEFT SHOULDER PAIN, UNSPECIFIED CHRONICITY: ICD-10-CM

## 2023-04-20 RX ORDER — TRAMADOL HYDROCHLORIDE 50 MG/1
50 TABLET ORAL EVERY 6 HOURS PRN
Qty: 30 TABLET | Refills: 0 | Status: SHIPPED | OUTPATIENT
Start: 2023-04-20

## 2023-04-20 NOTE — TELEPHONE ENCOUNTER
Per Dr. Richards said he can take Tramadol since he stopped taking NSAID's    Strong peripheral pulses

## 2023-04-25 NOTE — PRE-PROCEDURE INSTRUCTIONS
PATIENT INSTRUCTED TO BE:    - NPO AFTER MIDNIGHT EXCEPT CAN HAVE CLEAR LIQUIDS 2 HOURS PRIOR TO SURGERY ARRIVAL TIME     - TO HOLD ALL VITAMINS, SUPPLEMENTS, NSAIDS FOR ONE WEEK PRIOR TO THEIR SURGICAL PROCEDURE    - INSTRUCTED PT TO USE SURGICAL SOAP 1 TIME THE NIGHT PRIOR TO SURGERY OR THE AM OF SURGERY.   USE SOAP FROM NECK TO TOES AVOID THEIR FACE, HAIR, AND PRIVATE PARTS. INSTRUCTED NO LOTIONS, JEWELRY, PIERCINGS, OR DEODORANT DAY OF SURGERY    - IF DIABETIC, CHECK BLOOD GLUCOSE IF LESS THAN 70 CALL PREOP AREA -AM OF SURGERY     - INSTRUCTED TO TAKE THE FOLLOWING MEDICATIONS THE DAY OF SURGERY:       ALLOPURNIOL, LEXAPRO, SYNTHROID, PROTONIX, TRAMADOL PRN     PATIENT VERBALIZED UNDERSTANDING

## 2023-04-26 ENCOUNTER — ANESTHESIA (OUTPATIENT)
Dept: PERIOP | Facility: HOSPITAL | Age: 58
End: 2023-04-26
Payer: COMMERCIAL

## 2023-04-26 ENCOUNTER — HOSPITAL ENCOUNTER (OUTPATIENT)
Facility: HOSPITAL | Age: 58
Setting detail: HOSPITAL OUTPATIENT SURGERY
Discharge: HOME | End: 2023-04-26
Attending: ORTHOPAEDIC SURGERY | Admitting: ORTHOPAEDIC SURGERY
Payer: COMMERCIAL

## 2023-04-26 ENCOUNTER — APPOINTMENT (OUTPATIENT)
Dept: GENERAL RADIOLOGY | Facility: HOSPITAL | Age: 58
End: 2023-04-26
Payer: COMMERCIAL

## 2023-04-26 ENCOUNTER — ANESTHESIA EVENT (OUTPATIENT)
Dept: PERIOP | Facility: HOSPITAL | Age: 58
End: 2023-04-26
Payer: COMMERCIAL

## 2023-04-26 VITALS
DIASTOLIC BLOOD PRESSURE: 68 MMHG | RESPIRATION RATE: 18 BRPM | WEIGHT: 218.26 LBS | HEIGHT: 69 IN | HEART RATE: 99 BPM | SYSTOLIC BLOOD PRESSURE: 125 MMHG | TEMPERATURE: 97 F | BODY MASS INDEX: 32.33 KG/M2 | OXYGEN SATURATION: 92 %

## 2023-04-26 DIAGNOSIS — M75.42 IMPINGEMENT SYNDROME OF LEFT SHOULDER: ICD-10-CM

## 2023-04-26 PROCEDURE — 71045 X-RAY EXAM CHEST 1 VIEW: CPT

## 2023-04-26 PROCEDURE — C1713 ANCHOR/SCREW BN/BN,TIS/BN: HCPCS | Performed by: ORTHOPAEDIC SURGERY

## 2023-04-26 PROCEDURE — 25010000002 FENTANYL CITRATE (PF) 50 MCG/ML SOLUTION: Performed by: NURSE ANESTHETIST, CERTIFIED REGISTERED

## 2023-04-26 PROCEDURE — 25010000002 CEFAZOLIN IN DEXTROSE 2-4 GM/100ML-% SOLUTION: Performed by: ORTHOPAEDIC SURGERY

## 2023-04-26 PROCEDURE — 25010000002 ATROPINE SULFATE 1 MG/10ML SOLUTION PREFILLED SYRINGE: Performed by: NURSE ANESTHETIST, CERTIFIED REGISTERED

## 2023-04-26 PROCEDURE — 25010000002 SUGAMMADEX 200 MG/2ML SOLUTION: Performed by: NURSE ANESTHETIST, CERTIFIED REGISTERED

## 2023-04-26 PROCEDURE — 25010000002 DEXAMETHASONE PER 1 MG: Performed by: ANESTHESIOLOGY

## 2023-04-26 PROCEDURE — 25010000002 ROPIVACAINE PER 1 MG: Performed by: ANESTHESIOLOGY

## 2023-04-26 PROCEDURE — 25010000002 PROPOFOL 10 MG/ML EMULSION: Performed by: NURSE ANESTHETIST, CERTIFIED REGISTERED

## 2023-04-26 PROCEDURE — 25010000002 ONDANSETRON PER 1 MG: Performed by: NURSE ANESTHETIST, CERTIFIED REGISTERED

## 2023-04-26 PROCEDURE — 25010000002 MIDAZOLAM PER 1MG: Performed by: ANESTHESIOLOGY

## 2023-04-26 DEVICE — IMPLANTABLE DEVICE
Type: IMPLANTABLE DEVICE | Site: SHOULDER | Status: FUNCTIONAL
Brand: QUATTRO® LINK SP KNOTLESS ANCHOR

## 2023-04-26 DEVICE — IMPLANTABLE DEVICE
Type: IMPLANTABLE DEVICE | Site: SHOULDER | Status: FUNCTIONAL
Brand: QUATTRO® X3 TRIPLE LOADED SUTURE ANCHOR

## 2023-04-26 RX ORDER — OXYCODONE HYDROCHLORIDE 5 MG/1
5 TABLET ORAL
Status: DISCONTINUED | OUTPATIENT
Start: 2023-04-26 | End: 2023-04-26 | Stop reason: HOSPADM

## 2023-04-26 RX ORDER — PROPOFOL 10 MG/ML
VIAL (ML) INTRAVENOUS AS NEEDED
Status: DISCONTINUED | OUTPATIENT
Start: 2023-04-26 | End: 2023-04-26 | Stop reason: SURG

## 2023-04-26 RX ORDER — ESMOLOL HYDROCHLORIDE 10 MG/ML
INJECTION INTRAVENOUS AS NEEDED
Status: DISCONTINUED | OUTPATIENT
Start: 2023-04-26 | End: 2023-04-26 | Stop reason: SURG

## 2023-04-26 RX ORDER — GLYCOPYRROLATE 0.2 MG/ML
INJECTION INTRAMUSCULAR; INTRAVENOUS AS NEEDED
Status: DISCONTINUED | OUTPATIENT
Start: 2023-04-26 | End: 2023-04-26 | Stop reason: SURG

## 2023-04-26 RX ORDER — SODIUM CHLORIDE 9 MG/ML
40 INJECTION, SOLUTION INTRAVENOUS AS NEEDED
Status: DISCONTINUED | OUTPATIENT
Start: 2023-04-26 | End: 2023-04-26 | Stop reason: HOSPADM

## 2023-04-26 RX ORDER — MIDAZOLAM HYDROCHLORIDE 2 MG/2ML
2 INJECTION, SOLUTION INTRAMUSCULAR; INTRAVENOUS
Status: DISCONTINUED | OUTPATIENT
Start: 2023-04-26 | End: 2023-04-26 | Stop reason: HOSPADM

## 2023-04-26 RX ORDER — SODIUM CHLORIDE 0.9 % (FLUSH) 0.9 %
10 SYRINGE (ML) INJECTION EVERY 12 HOURS SCHEDULED
Status: DISCONTINUED | OUTPATIENT
Start: 2023-04-26 | End: 2023-04-26 | Stop reason: HOSPADM

## 2023-04-26 RX ORDER — DEXAMETHASONE SODIUM PHOSPHATE 4 MG/ML
INJECTION, SOLUTION INTRA-ARTICULAR; INTRALESIONAL; INTRAMUSCULAR; INTRAVENOUS; SOFT TISSUE AS NEEDED
Status: DISCONTINUED | OUTPATIENT
Start: 2023-04-26 | End: 2023-04-26 | Stop reason: SURG

## 2023-04-26 RX ORDER — SODIUM CHLORIDE 0.9 % (FLUSH) 0.9 %
10 SYRINGE (ML) INJECTION AS NEEDED
Status: DISCONTINUED | OUTPATIENT
Start: 2023-04-26 | End: 2023-04-26 | Stop reason: HOSPADM

## 2023-04-26 RX ORDER — ROPIVACAINE HYDROCHLORIDE 5 MG/ML
INJECTION, SOLUTION EPIDURAL; INFILTRATION; PERINEURAL AS NEEDED
Status: DISCONTINUED | OUTPATIENT
Start: 2023-04-26 | End: 2023-04-26 | Stop reason: SURG

## 2023-04-26 RX ORDER — ATROPINE SULFATE 0.1 MG/ML
INJECTION INTRAVENOUS AS NEEDED
Status: DISCONTINUED | OUTPATIENT
Start: 2023-04-26 | End: 2023-04-26 | Stop reason: SURG

## 2023-04-26 RX ORDER — ONDANSETRON 2 MG/ML
INJECTION INTRAMUSCULAR; INTRAVENOUS AS NEEDED
Status: DISCONTINUED | OUTPATIENT
Start: 2023-04-26 | End: 2023-04-26 | Stop reason: SURG

## 2023-04-26 RX ORDER — EPHEDRINE SULFATE 50 MG/ML
INJECTION, SOLUTION INTRAVENOUS AS NEEDED
Status: DISCONTINUED | OUTPATIENT
Start: 2023-04-26 | End: 2023-04-26 | Stop reason: SURG

## 2023-04-26 RX ORDER — ROCURONIUM BROMIDE 10 MG/ML
INJECTION, SOLUTION INTRAVENOUS AS NEEDED
Status: DISCONTINUED | OUTPATIENT
Start: 2023-04-26 | End: 2023-04-26 | Stop reason: SURG

## 2023-04-26 RX ORDER — MEPERIDINE HYDROCHLORIDE 25 MG/ML
12.5 INJECTION INTRAMUSCULAR; INTRAVENOUS; SUBCUTANEOUS
Status: DISCONTINUED | OUTPATIENT
Start: 2023-04-26 | End: 2023-04-26 | Stop reason: HOSPADM

## 2023-04-26 RX ORDER — HYDROCODONE BITARTRATE AND ACETAMINOPHEN 7.5; 325 MG/1; MG/1
1 TABLET ORAL ONCE AS NEEDED
Status: DISCONTINUED | OUTPATIENT
Start: 2023-04-26 | End: 2023-04-26 | Stop reason: HOSPADM

## 2023-04-26 RX ORDER — CEFAZOLIN SODIUM IN 0.9 % NACL 3 G/100 ML
3 INTRAVENOUS SOLUTION, PIGGYBACK (ML) INTRAVENOUS ONCE
Status: DISCONTINUED | OUTPATIENT
Start: 2023-04-26 | End: 2023-04-26 | Stop reason: DRUGHIGH

## 2023-04-26 RX ORDER — ACETAMINOPHEN 500 MG
1000 TABLET ORAL ONCE
Status: COMPLETED | OUTPATIENT
Start: 2023-04-26 | End: 2023-04-26

## 2023-04-26 RX ORDER — CEFAZOLIN SODIUM 2 G/100ML
2 INJECTION, SOLUTION INTRAVENOUS ONCE
Status: COMPLETED | OUTPATIENT
Start: 2023-04-26 | End: 2023-04-26

## 2023-04-26 RX ORDER — IPRATROPIUM BROMIDE AND ALBUTEROL SULFATE 2.5; .5 MG/3ML; MG/3ML
3 SOLUTION RESPIRATORY (INHALATION) ONCE
Status: COMPLETED | OUTPATIENT
Start: 2023-04-26 | End: 2023-04-26

## 2023-04-26 RX ORDER — SUCCINYLCHOLINE/SOD CL,ISO/PF 100 MG/5ML
SYRINGE (ML) INTRAVENOUS AS NEEDED
Status: DISCONTINUED | OUTPATIENT
Start: 2023-04-26 | End: 2023-04-26 | Stop reason: SURG

## 2023-04-26 RX ORDER — FENTANYL CITRATE 50 UG/ML
INJECTION, SOLUTION INTRAMUSCULAR; INTRAVENOUS AS NEEDED
Status: DISCONTINUED | OUTPATIENT
Start: 2023-04-26 | End: 2023-04-26 | Stop reason: SURG

## 2023-04-26 RX ORDER — HYDROCODONE BITARTRATE AND ACETAMINOPHEN 7.5; 325 MG/1; MG/1
1-2 TABLET ORAL EVERY 4 HOURS PRN
Qty: 40 TABLET | Refills: 0 | Status: SHIPPED | OUTPATIENT
Start: 2023-04-26

## 2023-04-26 RX ORDER — LIDOCAINE HYDROCHLORIDE 20 MG/ML
INJECTION, SOLUTION EPIDURAL; INFILTRATION; INTRACAUDAL; PERINEURAL AS NEEDED
Status: DISCONTINUED | OUTPATIENT
Start: 2023-04-26 | End: 2023-04-26 | Stop reason: SURG

## 2023-04-26 RX ORDER — SODIUM CHLORIDE, SODIUM LACTATE, POTASSIUM CHLORIDE, CALCIUM CHLORIDE 600; 310; 30; 20 MG/100ML; MG/100ML; MG/100ML; MG/100ML
9 INJECTION, SOLUTION INTRAVENOUS CONTINUOUS PRN
Status: DISCONTINUED | OUTPATIENT
Start: 2023-04-26 | End: 2023-04-26 | Stop reason: HOSPADM

## 2023-04-26 RX ORDER — PHENYLEPHRINE HCL IN 0.9% NACL 1 MG/10 ML
SYRINGE (ML) INTRAVENOUS AS NEEDED
Status: DISCONTINUED | OUTPATIENT
Start: 2023-04-26 | End: 2023-04-26 | Stop reason: SURG

## 2023-04-26 RX ORDER — PROMETHAZINE HYDROCHLORIDE 12.5 MG/1
25 TABLET ORAL ONCE AS NEEDED
Status: DISCONTINUED | OUTPATIENT
Start: 2023-04-26 | End: 2023-04-26 | Stop reason: HOSPADM

## 2023-04-26 RX ORDER — ONDANSETRON 2 MG/ML
4 INJECTION INTRAMUSCULAR; INTRAVENOUS ONCE AS NEEDED
Status: DISCONTINUED | OUTPATIENT
Start: 2023-04-26 | End: 2023-04-26 | Stop reason: HOSPADM

## 2023-04-26 RX ORDER — PROMETHAZINE HYDROCHLORIDE 25 MG/1
25 SUPPOSITORY RECTAL ONCE AS NEEDED
Status: DISCONTINUED | OUTPATIENT
Start: 2023-04-26 | End: 2023-04-26 | Stop reason: HOSPADM

## 2023-04-26 RX ADMIN — ROCURONIUM BROMIDE 10 MG: 10 INJECTION, SOLUTION INTRAVENOUS at 09:09

## 2023-04-26 RX ADMIN — PROPOFOL 40 MG: 10 INJECTION, EMULSION INTRAVENOUS at 09:56

## 2023-04-26 RX ADMIN — SODIUM CHLORIDE, POTASSIUM CHLORIDE, SODIUM LACTATE AND CALCIUM CHLORIDE: 600; 310; 30; 20 INJECTION, SOLUTION INTRAVENOUS at 09:56

## 2023-04-26 RX ADMIN — SODIUM CHLORIDE, POTASSIUM CHLORIDE, SODIUM LACTATE AND CALCIUM CHLORIDE 9 ML/HR: 600; 310; 30; 20 INJECTION, SOLUTION INTRAVENOUS at 08:13

## 2023-04-26 RX ADMIN — ONDANSETRON 4 MG: 2 INJECTION INTRAMUSCULAR; INTRAVENOUS at 09:45

## 2023-04-26 RX ADMIN — EPHEDRINE SULFATE 20 MG: 50 INJECTION INTRAVENOUS at 09:49

## 2023-04-26 RX ADMIN — CEFAZOLIN SODIUM 2 G: 2 INJECTION, SOLUTION INTRAVENOUS at 09:04

## 2023-04-26 RX ADMIN — Medication 200 MCG: at 09:45

## 2023-04-26 RX ADMIN — FENTANYL CITRATE 50 MCG: 50 INJECTION, SOLUTION INTRAMUSCULAR; INTRAVENOUS at 09:04

## 2023-04-26 RX ADMIN — Medication 100 MG: at 09:09

## 2023-04-26 RX ADMIN — ATROPINE SULFATE 0.2 MG: 0.1 INJECTION INTRAVENOUS at 09:49

## 2023-04-26 RX ADMIN — FENTANYL CITRATE 50 MCG: 50 INJECTION, SOLUTION INTRAMUSCULAR; INTRAVENOUS at 09:09

## 2023-04-26 RX ADMIN — DEXAMETHASONE SODIUM PHOSPHATE 4 MG: 4 INJECTION INTRA-ARTICULAR; INTRALESIONAL; INTRAMUSCULAR; INTRAVENOUS; SOFT TISSUE at 08:28

## 2023-04-26 RX ADMIN — ROPIVACAINE HYDROCHLORIDE 20 ML: 5 INJECTION, SOLUTION EPIDURAL; INFILTRATION; PERINEURAL at 08:28

## 2023-04-26 RX ADMIN — ROCURONIUM BROMIDE 40 MG: 10 INJECTION, SOLUTION INTRAVENOUS at 09:23

## 2023-04-26 RX ADMIN — GLYCOPYRROLATE 0.2 MG: 0.2 INJECTION INTRAMUSCULAR; INTRAVENOUS at 09:47

## 2023-04-26 RX ADMIN — PROPOFOL 40 MG: 10 INJECTION, EMULSION INTRAVENOUS at 09:51

## 2023-04-26 RX ADMIN — PROPOFOL 300 MG: 10 INJECTION, EMULSION INTRAVENOUS at 09:09

## 2023-04-26 RX ADMIN — MIDAZOLAM HYDROCHLORIDE 2 MG: 1 INJECTION, SOLUTION INTRAMUSCULAR; INTRAVENOUS at 08:20

## 2023-04-26 RX ADMIN — LIDOCAINE HYDROCHLORIDE 100 MG: 20 INJECTION, SOLUTION EPIDURAL; INFILTRATION; INTRACAUDAL; PERINEURAL at 09:09

## 2023-04-26 RX ADMIN — IPRATROPIUM BROMIDE AND ALBUTEROL SULFATE 3 ML: 2.5; .5 SOLUTION RESPIRATORY (INHALATION) at 12:16

## 2023-04-26 RX ADMIN — ESMOLOL HYDROCHLORIDE 15 MG: 10 INJECTION INTRAVENOUS at 10:02

## 2023-04-26 RX ADMIN — SUGAMMADEX 200 MG: 100 INJECTION, SOLUTION INTRAVENOUS at 10:21

## 2023-04-26 RX ADMIN — EPHEDRINE SULFATE 20 MG: 50 INJECTION INTRAVENOUS at 09:48

## 2023-04-26 RX ADMIN — ACETAMINOPHEN 1000 MG: 500 TABLET ORAL at 08:13

## 2023-04-26 NOTE — ADDENDUM NOTE
Addendum  created 04/26/23 1232 by Quintin Saba MD    Order list changed, Pharmacy for encounter modified

## 2023-04-26 NOTE — ANESTHESIA POSTPROCEDURE EVALUATION
Patient: Jose Louie    Procedure Summary     Date: 04/26/23 Room / Location: [facility] OR  / [facility]    Anesthesia Start: 0903 Anesthesia Stop: 1046    Procedure: SHOULDER ARTHROSCOPY WITH ROTATOR CUFF REPAIR SUBACROMIAL DECOMPRESSION AND DISTAL CLAVICULECTOMY (Left: Shoulder) Diagnosis:       Impingement syndrome of left shoulder      (Impingement syndrome of left shoulder [M75.42])    Surgeons: Elver Richards MD Provider: Quintin Saba MD    Anesthesia Type: general ASA Status: 2          Anesthesia Type: general    Vitals  Vitals Value Taken Time   /68 04/26/23 1137   Temp 36.1 °C (97 °F) 04/26/23 1042   Pulse 102 04/26/23 1159   Resp 18 04/26/23 1135   SpO2 88 % 04/26/23 1159   Vitals shown include unvalidated device data.        Post Anesthesia Care and Evaluation    Patient location during evaluation: PACU  Patient participation: complete - patient participated  Level of consciousness: awake and alert  Pain management: adequate    Airway patency: patent  Anesthetic complications: No anesthetic complications  PONV Status: none  Cardiovascular status: acceptable  Respiratory status: acceptable  Hydration status: acceptable    Comments: Pt with low SpO2 in PACU - 87-90%. Pt has no SOB, and is comfortable.  He was placed on incentive spirometry, and was given a Duo-neb nebulizer breathing treatment, without any significant improvement.  CXR was obtained, and reviewed by me. 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  The patient was counseled and reassured. Anticipate resolution with block resolution.     I or my partners:   - Performed the pre-anesthetic examination and evaluation  - Formulated and discussed the anesthetic plan  - Personally participated in the most demanding procedures of the anesthesia plan, including vascular access, placement of neuraxial  and nerve blocks, induction, airway management, positioning, and emergence  - Ensured that all procedures of the anesthetic plan were performed by qualified personnel  - Monitored the course of the anesthetic at frequent intervals  - Remained physically present and available throughout the anesthetic for immediate diagnosis and treatment of emergencies  - Provided the necessary post-anesthetic care

## 2023-04-26 NOTE — H&P
"Chief Complaint  No chief complaint on file.     Subjective      Jose Louie presents to [facility] for follow up of the left shoulder. He has had pain since January.  weeks especially with rotation and abduction/adduction.  He has had no treatment except Aleve and patches.  He had an injury 1-2 weeks ago going to the gym.  He had an injection in January and that lasted a couple of weeks.  He is here for MRI results.     No Known Allergies     Social History     Socioeconomic History   • Marital status:    Tobacco Use   • Smoking status: Never   • Smokeless tobacco: Never   Vaping Use   • Vaping Use: Never used   Substance and Sexual Activity   • Alcohol use: Yes     Comment: socially   • Drug use: Never   • Sexual activity: Defer        Review of Systems     Objective   Vital Signs:   Ht 175.3 cm (69\")   Wt 95.3 kg (210 lb)   BMI 31.01 kg/m²       Physical Exam  Constitutional:       Appearance: Normal appearance. Patient is well-developed and normal weight.   HENT:      Head: Normocephalic.      Right Ear: Hearing and external ear normal.      Left Ear: Hearing and external ear normal.      Nose: Nose normal.   Eyes:      Conjunctiva/sclera: Conjunctivae normal.   Cardiovascular:      Rate and Rhythm: Normal rate.   Pulmonary:      Effort: Pulmonary effort is normal.      Breath sounds: No wheezing or rales.   Abdominal:      Palpations: Abdomen is soft.      Tenderness: There is no abdominal tenderness.   Musculoskeletal:      Cervical back: Normal range of motion.   Skin:     Findings: No rash.   Neurological:      Mental Status: Patient is alert and oriented to person, place, and time.   Psychiatric:         Mood and Affect: Mood and affect normal.         Judgment: Judgment normal.       Ortho Exam      LEFT SHOULDER Forward flexion 140 . Abduction 90 with pain. External rotation 50 with pain. Internal rotation to L5. Positive Cross body adduction. Supraspinatus strength " 3/5. Infraspinatus Strength 3/5. Infrared subscap 3/5. Positive Agrawal. Positive Neer. Negative Apprehension. Negative Lift off. (Negative Obriens. Sensation intact to light touch, median, radial, ulnar nerve. Positive AIN, PIN, ulnar nerve motor. Positive pulses. Positive Impingement signs. Good strength in triceps, biceps, deltoid, wrist extensors and wrist flexors.       Procedures      Imaging Results (Most Recent)     None           Result Review :   MRI [facility]   3/13/23  IMPRESSION:  1. Mild to moderate supraspinatus and infraspinatus tendinopathy. No rotator cuff tear. 2. Intact long head biceps tendon and glenoid   labrum. 3. Mild to moderate AC joint arthrosis producing mild mass effect on the supraspinatus outlet. 4. Mild inflammation of the   subacromial/subdeltoid bursa.          Assessment and Plan     There are no diagnoses linked to this encounter.    Discussed the treatment plan with the patient. I reviewed the MRI results with the patient. Discussed the treatment options with the patient, operative vs non-operative. Risks and benefits of left shoulder arthroscopy were explained.  HE understands and wishes to proceed.     Call or return if worsening symptoms.    Follow Up     Post op    I have personally performed the services described in this document as scribed by the above individual and it is both accurate and complete. Elver Richards MD 04/26/23

## 2023-04-26 NOTE — DISCHARGE INSTRUCTIONS
DISCHARGE INSTRUCTIONS  Post-Operative  Arthroscopic Shoulder Surgery      For your surgery you had:  General anesthesia (you may have a sore throat for the first 24 hours)  IV sedation.  Local anesthesia  Monitored anesthesia care  You may experience dizziness, drowsiness, or light-headedness for several hours following surgery/procedure.  Do not stay alone today or tonight.  Limit your activity for 24 hours.  Resume your diet slowly.  Follow whatever special dietary instructions you may have been given by your doctor.  You should not drive or operate machinery or drink alcohol for 24 hours or while you are taking pain medication.  You should not sign legally binding documents.  Post-Operative Day #1 is counted as the 1st day after surgery.  [x] If you had a regional block, you will not have control of your arm for up to 12 hours.  Protect the arm with a sling or follow your physician's specific instructions.  Post-Operative Day #3- SATURDAY  Remove your dressing.  Under the dressing you will either have sutures or staples.  Change dressing once or twice daily.  Place a dry dressing or band-aids over the small incisions.  Prior to dressing change, wash your hands.  You may shower.  During the shower, you may let the water hit the incision and roll down but do not scrub or place any soap on the incision.  Do not soak it.  Pat it dry and do not put any ointments on the incision unless directed by surgeon. Then replace the dry dressing.    General Instructions  [x] Use ice pack to shoulder for 72 hours.  This can help with reducing swelling and pain relief.  Apply 20 minutes on and 20 minutes off.  Never place ice directly on skin.  Avoid getting dressing wet.  Keep arm elevated with sling to decrease swelling and minimize throbbing pain.  The sling will provide support for your shoulder.  It is important to remove the sling 3-5 times daily to work on range-of-motion of the elbow, wrist and hand.  You will receive a  prescription for physical therapy, unless otherwise instructed by physician.  After most shoulder surgeries, it is permissible to start exercises: You may lean over and let the arm and hand do small or large circles or write the alphabet with your hanging arm to keep it loose.  It is normal to have some pain with these gentle exercises.  The exercises help reduce swelling and pain overall and help to avoid a stiff shoulder post-operatively.  It is okay to come out of the sling for showering or for certain activities of daily living but avoid any lifting or carrying with the affected arm until instructed by the physician especially if you have had a repair of the rotator cuff or some type of reconstructive procedure.  It is okay to come out of the sling and support the arm with a pillow if you remain sedentary.  In general, sling use is preferred following a repair or reconstruction for 4 weeks.  If you have had a SAD (sub acromial decompression), continue sling use more liberally because there was not a repair or reconstruction that could be harmed.          DISCHARGE INSTRUCTIONS  Post-Operative   Arthroscopic Shoulder Surgery      Exercise fingers for 10 minutes every hour while awake.  The physician will typically inform the family and the patient whether or not a repair or reconstruction could be harmed.  If you have had a rotator cuff repair or reconstructive procedure, do not let the arm drop down suddenly from an elevated position down to your side despite improvements made in pain and over the 3 months following repair and reconstruction.  It generally takes 8-12 weeks before the repair or reconstruction does not rely on sutures and anchors that are placed for the repair.  You are generally given a prescription for a narcotic medication.  Take as prescribed.  You may use over-the-counter anti-inflammatory medications such as Motrin or Aleve for additional pain or fever reduction if not allergic.  If you are  taking the pain medication prescribed, do not take Tylenol too unless you consult with the pharmacist. The pain medications generally have Tylenol in them and too much Tylenol can be harmful.  Sometimes it is recommended to take an anti-inflammatory in between doses of prescription pain medication if additional pain relief is needed.  Consult with your physician or your pharmacist before taking over-the-counter pain medications/anti-inflammatories.  It is not uncommon to have a fever post-operatively especially in the first 24-48 hours.  Anti-inflammatories can be used for fever reduction also.  It is normal to have some redness or irritation around skin sutures or staples, however, if you have any expanding areas of redness with a persistent fever and increasing pain notify the physician as soon as possible.  The incidence of blood clots is low following arthroscopic procedures, however, if you notice any increasing pain or swelling in your calves or legs, contact the physician as soon as possible.   It is difficult to sleep in the customary fashion following a shoulder surgery.  It is usually necessary to sleep with the shoulder above the level of the heart such as in a recliner, couch or with the head of the bed elevated.  This should slowly improve over the weeks following shoulder surgery.  If unable to urinate 6 to 8 hours after surgery or urinating frequently in small amounts, notify the physician or go to the nearest Emergency Room.  SPECIAL INSTRUCTIONS:    NOTIFY YOUR PHYSICIAN IF YOU EXPERIENCE:  Numbness of fingers.  Inability to move fingers.  Extreme coldness, paleness or blue dis-coloration of fingers.  Any pain other than from the incision, such as swelling of the arm that blocks circulation of fingers.  Follow verbal instructions from your doctor.  Medications per physician instructions as indicated on Discharge Medication Information Sheet.    Missing your appointment/follow-up could lead to  serious complications  If you have an emergency and cannot reach your doctor, go to an Emergency Room nearest your home.

## 2023-04-26 NOTE — OP NOTE
SHOULDER ARTHROSCOPY WITH ROTATOR CUFF REPAIR  Procedure Report    Patient Name:  Jose Louie  YOB: 1965    Date of Surgery:  4/26/2023     Indications:  shoulder pain/injury, failed conservative care    Pre-op Diagnosis:   Impingement syndrome of left shoulder [M75.42]       Post-Op Diagnosis Codes:     * Impingement syndrome of left shoulder [M75.42] with full-thickness rotator cuff tear and primary AC joint arthritis    Procedure/CPT® Codes:      Procedure(s):  Left SHOULDER ARTHROSCOPY WITH MINI OPEN ROTATOR CUFF REPAIR, ARTHROSCOPIC EXTENSIVE SUBACROMIAL DECOMPRESSION AND, ARTHROSCOPIC DISTAL CLAVICULECTOMY    Staff:  Surgeon(s):  Elver Richards MD    Assistant: Tennille Johnson    Anesthesia: General    Estimated Blood Loss: minimal    Implants:    Implant Name Type Inv. Item Serial No.  Lot No. LRB No. Used Action   SUT/ANCH QUATTRO X3 PRELD W/3/SZ2/FORCEFIBER/SUT 5.5MM - [id number] Implant SUT/ANCH QUATTRO X3 PRELD W/3/SZ2/FORCEFIBER/SUT 5.5MM  [facility] [id number] Left 1 Implanted   SUT/ANCH QUATTRO LINK KNOTLSS 4.5 MTL TP - [id number] Implant SUT/ANCH QUATTRO LINK KNOTLSS 4.5 MTL TP  [facility] [id number] Left 1 Implanted       Specimen:          None        Findings: + rotator cuff tear    Complications: none    Description of Procedure: The patient was brought to the operating room and had a preoperative antibiotic and preoperative block. The patient was placed in a modified beach chair position and was prepped and draped in sterile fashion. A posterior portal was made. The scope was inserted to the glenohumeral joint. There was no labral tear, no biceps tendon tear, and there was a full-thickness rotator cuff tear. Attention was then turned to the subacromial space where an extensive subacromial decompression was then performed using a VAPR, shaver and a michelle. This included an acromioplasty and bursectomy, inspection of the rotator cuff.  Attention was then  turned to the distal clavicle where a distal claviculectomy was then performed using a VAPR and a michelle. Following this, a mini open incision was made. The deltoid was split and retractors placed. The rotator cuff tear was identified and then this was repaired using a single medial row anchor and  lateral row anchor. A good repair was achieved. This was then thoroughly irrigated, closed using #1 Vicryl, 2-0 Vicryl and 3-0 Monocryl, and 3-0 nylon was used for the portals. Sterile dressing was applied followed by application of an ice pack and UltraSling. The patient was then extubated and taken to the recovery room in stable condition. No complications.    Assistant: Tennille Johnson  was responsible for performing the following activities: Retraction, Suction, Irrigation, Closing and Placing Dressing and their skilled assistance was necessary for the success of this case.    Elver Richards MD     Date: 4/26/2023  Time: 10:31 EDT

## 2023-04-26 NOTE — ANESTHESIA PREPROCEDURE EVALUATION
Anesthesia Evaluation     no history of anesthetic complications:  NPO Solid Status: > 6 hours  NPO Liquid Status: > 2 hours           Airway   Mallampati: II  TM distance: >3 FB  Neck ROM: full  Dental - normal exam     Pulmonary - normal exam   (+) sleep apnea on CPAP,   Cardiovascular - normal exam    (+) hypertension, hyperlipidemia,       Neuro/Psych  (+) psychiatric history Anxiety and Depression,    GI/Hepatic/Renal/Endo    (+) obesity,  GERD well controlled,  thyroid problem hypothyroidism    Musculoskeletal (-) negative ROS    Abdominal  - normal exam   Substance History - negative use     OB/GYN negative ob/gyn ROS         Other - negative ROS                     Anesthesia Plan    ASA 2     general with block     intravenous induction     Anesthetic plan, risks, benefits, and alternatives have been provided, discussed and informed consent has been obtained with: patient.    Plan discussed with CRNA.        CODE STATUS:

## 2023-04-26 NOTE — ANESTHESIA PROCEDURE NOTES
Peripheral Block    Pre-sedation assessment completed: 4/26/2023 8:19 AM    Patient reassessed immediately prior to procedure    Patient location during procedure: pre-op  Start time: 4/26/2023 8:19 AM  Stop time: 4/26/2023 8:28 AM  Reason for block: at surgeon's request and post-op pain management  Performed by  Anesthesiologist: Quintin Saba MD  Preanesthetic Checklist  Completed: patient identified, IV checked, site marked, risks and benefits discussed, surgical consent, monitors and equipment checked, pre-op evaluation and timeout performed  Prep:  Pt Position: sitting  Prep: ChloraPrep  Patient monitoring: blood pressure monitoring, continuous pulse oximetry and EKG  Procedure    Sedation: yes  Performed under: local infiltration  Guidance:ultrasound guided    ULTRASOUND INTERPRETATION.  Using ultrasound guidance a 20 G gauge needle was placed in close proximity to the nerve, at which point, under ultrasound guidance anesthetic was injected in the area of the nerve and spread of the anesthesia was seen on ultrasound in close proximity thereto.  There were no abnormalities seen on ultrasound; a digital image was taken; and the patient tolerated the procedure with no complications. Images:still images obtained, printed/placed on chart    Laterality:left  Block Type:interscalene  Injection Technique:single-shot  Needle Type:echogenic  Resistance on Injection: none          Post Assessment  Injection Assessment: negative aspiration for heme, no paresthesia on injection and incremental injection  Patient Tolerance:comfortable throughout block  Complications:no

## 2023-05-02 ENCOUNTER — TELEPHONE (OUTPATIENT)
Dept: ORTHOPEDIC SURGERY | Facility: CLINIC | Age: 58
End: 2023-05-02
Payer: COMMERCIAL

## 2023-05-02 DIAGNOSIS — M75.42 IMPINGEMENT SYNDROME OF LEFT SHOULDER: ICD-10-CM

## 2023-05-02 DIAGNOSIS — M75.52 BURSITIS OF LEFT SHOULDER: Primary | ICD-10-CM

## 2023-05-02 RX ORDER — CEPHALEXIN 500 MG/1
500 CAPSULE ORAL 4 TIMES DAILY
Qty: 40 CAPSULE | Refills: 0 | Status: SHIPPED | OUTPATIENT
Start: 2023-05-02 | End: 2023-05-12

## 2023-05-05 ENCOUNTER — OFFICE VISIT (OUTPATIENT)
Dept: ORTHOPEDIC SURGERY | Facility: CLINIC | Age: 58
End: 2023-05-05
Payer: COMMERCIAL

## 2023-05-05 ENCOUNTER — OFFICE VISIT (OUTPATIENT)
Dept: INTERNAL MEDICINE | Facility: CLINIC | Age: 58
End: 2023-05-05
Payer: COMMERCIAL

## 2023-05-05 VITALS — WEIGHT: 218 LBS | BODY MASS INDEX: 32.29 KG/M2 | HEIGHT: 69 IN

## 2023-05-05 VITALS
SYSTOLIC BLOOD PRESSURE: 124 MMHG | HEART RATE: 68 BPM | OXYGEN SATURATION: 97 % | BODY MASS INDEX: 33.03 KG/M2 | TEMPERATURE: 98.2 F | HEIGHT: 69 IN | RESPIRATION RATE: 18 BRPM | DIASTOLIC BLOOD PRESSURE: 70 MMHG | WEIGHT: 223 LBS

## 2023-05-05 DIAGNOSIS — Z12.5 SCREENING PSA (PROSTATE SPECIFIC ANTIGEN): ICD-10-CM

## 2023-05-05 DIAGNOSIS — F41.9 ANXIETY: ICD-10-CM

## 2023-05-05 DIAGNOSIS — Z98.890 STATUS POST LEFT ROTATOR CUFF REPAIR: Primary | ICD-10-CM

## 2023-05-05 DIAGNOSIS — I10 PRIMARY HYPERTENSION: ICD-10-CM

## 2023-05-05 DIAGNOSIS — M1A.9XX0 CHRONIC GOUT WITHOUT TOPHUS, UNSPECIFIED CAUSE, UNSPECIFIED SITE: ICD-10-CM

## 2023-05-05 DIAGNOSIS — E78.2 MIXED HYPERLIPIDEMIA: ICD-10-CM

## 2023-05-05 DIAGNOSIS — K21.9 GASTROESOPHAGEAL REFLUX DISEASE, UNSPECIFIED WHETHER ESOPHAGITIS PRESENT: ICD-10-CM

## 2023-05-05 DIAGNOSIS — E29.1 HYPOGONADISM IN MALE: Primary | ICD-10-CM

## 2023-05-05 LAB
ALBUMIN SERPL-MCNC: 4 G/DL (ref 3.5–5.2)
ALBUMIN/GLOB SERPL: 1.3 G/DL
ALP SERPL-CCNC: 66 U/L (ref 39–117)
ALT SERPL W P-5'-P-CCNC: 26 U/L (ref 1–41)
ANION GAP SERPL CALCULATED.3IONS-SCNC: 8 MMOL/L (ref 5–15)
AST SERPL-CCNC: 26 U/L (ref 1–40)
BASOPHILS # BLD AUTO: 0.03 10*3/MM3 (ref 0–0.2)
BASOPHILS NFR BLD AUTO: 0.3 % (ref 0–1.5)
BILIRUB SERPL-MCNC: 0.2 MG/DL (ref 0–1.2)
BUN SERPL-MCNC: 17 MG/DL (ref 6–20)
BUN/CREAT SERPL: 15.6 (ref 7–25)
CALCIUM SPEC-SCNC: 9.3 MG/DL (ref 8.6–10.5)
CHLORIDE SERPL-SCNC: 102 MMOL/L (ref 98–107)
CHOLEST SERPL-MCNC: 219 MG/DL (ref 0–200)
CO2 SERPL-SCNC: 28 MMOL/L (ref 22–29)
CREAT SERPL-MCNC: 1.09 MG/DL (ref 0.76–1.27)
DEPRECATED RDW RBC AUTO: 40.3 FL (ref 37–54)
EGFRCR SERPLBLD CKD-EPI 2021: 79.7 ML/MIN/1.73
EOSINOPHIL # BLD AUTO: 0.33 10*3/MM3 (ref 0–0.4)
EOSINOPHIL NFR BLD AUTO: 3.7 % (ref 0.3–6.2)
ERYTHROCYTE [DISTWIDTH] IN BLOOD BY AUTOMATED COUNT: 11.7 % (ref 12.3–15.4)
GLOBULIN UR ELPH-MCNC: 3 GM/DL
GLUCOSE SERPL-MCNC: 74 MG/DL (ref 65–99)
HCT VFR BLD AUTO: 43.9 % (ref 37.5–51)
HDLC SERPL-MCNC: 39 MG/DL (ref 40–60)
HGB BLD-MCNC: 14.7 G/DL (ref 13–17.7)
IMM GRANULOCYTES # BLD AUTO: 0.06 10*3/MM3 (ref 0–0.05)
IMM GRANULOCYTES NFR BLD AUTO: 0.7 % (ref 0–0.5)
LDLC SERPL CALC-MCNC: 158 MG/DL (ref 0–100)
LDLC/HDLC SERPL: 3.99 {RATIO}
LYMPHOCYTES # BLD AUTO: 1.27 10*3/MM3 (ref 0.7–3.1)
LYMPHOCYTES NFR BLD AUTO: 14.2 % (ref 19.6–45.3)
MCH RBC QN AUTO: 31.8 PG (ref 26.6–33)
MCHC RBC AUTO-ENTMCNC: 33.5 G/DL (ref 31.5–35.7)
MCV RBC AUTO: 95 FL (ref 79–97)
MONOCYTES # BLD AUTO: 1.05 10*3/MM3 (ref 0.1–0.9)
MONOCYTES NFR BLD AUTO: 11.7 % (ref 5–12)
NEUTROPHILS NFR BLD AUTO: 6.23 10*3/MM3 (ref 1.7–7)
NEUTROPHILS NFR BLD AUTO: 69.4 % (ref 42.7–76)
NRBC BLD AUTO-RTO: 0 /100 WBC (ref 0–0.2)
PLATELET # BLD AUTO: 313 10*3/MM3 (ref 140–450)
PMV BLD AUTO: 11.4 FL (ref 6–12)
POTASSIUM SERPL-SCNC: 4.8 MMOL/L (ref 3.5–5.2)
PROT SERPL-MCNC: 7 G/DL (ref 6–8.5)
RBC # BLD AUTO: 4.62 10*6/MM3 (ref 4.14–5.8)
SODIUM SERPL-SCNC: 138 MMOL/L (ref 136–145)
TRIGL SERPL-MCNC: 121 MG/DL (ref 0–150)
TSH SERPL DL<=0.05 MIU/L-ACNC: 2.2 UIU/ML (ref 0.27–4.2)
VLDLC SERPL-MCNC: 22 MG/DL (ref 5–40)
WBC NRBC COR # BLD: 8.97 10*3/MM3 (ref 3.4–10.8)

## 2023-05-05 PROCEDURE — 80061 LIPID PANEL: CPT | Performed by: INTERNAL MEDICINE

## 2023-05-05 PROCEDURE — 80050 GENERAL HEALTH PANEL: CPT | Performed by: INTERNAL MEDICINE

## 2023-05-05 RX ORDER — TESTOSTERONE CYPIONATE 200 MG/ML
200 INJECTION, SOLUTION INTRAMUSCULAR
Qty: 4 ML | Refills: 2 | Status: SHIPPED | OUTPATIENT
Start: 2023-05-05

## 2023-05-05 NOTE — PROGRESS NOTES
Chief Complaint  Hypertension (6 month follow up ), Hypothyroidism, Hyperlipidemia, Hypogonadism in male, and Gout (Discuss uric acid lab to be done and medication )    Subjective          Jose Louie presents to [facility]  History of Present Illness     Left elbow erythema and swelling   The patient reports his rotator cuff surgery went well. He states 1 week after surgery he had to take the sling off due to elbow problems. The patient reports it was hot for about 5 days. He states he saw his orthopedic surgeon this morning. The patient reports he was put on Keflex. He states he is having night sweats. The patient reports he is taking pain medication and Motrin. He states he does not feel bad. The patient reports he gets worn out by the end of the day. The patient reports that Dr. Richards did his rotator cuff surgery.     Hypertension   The patient denies any issues with his blood pressure. He states his systolic number is usually not that low. The patient reports he checks it at home. He states it is typically in the 130s.     Hypogonadism   The patient reports he is sleeping 10 hours a day. He states he has gone to the 200 mg injection every 2 weeks. The patient reports he cannot tell any difference when he takes the shot. He states before this he stayed active 4 to 5 times a week, exercising and changing his diet. The patient reports he has been losing weight.     Anxiety   The patient reports he is down to 5 mg of Lexapro. He says he wants to get off it. The patient reports he is cranky about some things he should be.     Gout   The patient reports he wants to leave the allopurinol. He states his ankle got really “flared” before. The patient reports his ankle swelled. He states it is super tender, red, and hot.     Medical history    The patient reports he had a brachial plexus block. He states it paralyzed his diaphragm. The patient reports it took a  "while to get out of postop. He states when he woke up, he felt fine, however; his oxygen levels were low. The patient reports they did a chest x-ray.      Objective   Vital Signs:   /70 (BP Location: Right arm, Patient Position: Sitting, Cuff Size: Large Adult)   Pulse 68   Temp 98.2 °F (36.8 °C)   Resp 18   Ht 175.3 cm (69\")   Wt 101 kg (223 lb)   SpO2 97%   BMI 32.93 kg/m²     Physical Exam  Vitals reviewed.   Constitutional:       Appearance: Normal appearance. He is well-developed.   HENT:      Head: Normocephalic and atraumatic.      Right Ear: External ear normal.      Left Ear: External ear normal.   Eyes:      Conjunctiva/sclera: Conjunctivae normal.      Pupils: Pupils are equal, round, and reactive to light.   Cardiovascular:      Rate and Rhythm: Normal rate and regular rhythm.      Heart sounds: No murmur heard.    No friction rub. No gallop.   Pulmonary:      Effort: Pulmonary effort is normal.      Breath sounds: Normal breath sounds. No wheezing or rhonchi.   Musculoskeletal:      Comments: Left elbow with erythema, swelling and warmth   Skin:     General: Skin is warm and dry.   Neurological:      Mental Status: He is alert and oriented to person, place, and time.   Psychiatric:         Mood and Affect: Affect normal.         Behavior: Behavior normal.         Thought Content: Thought content normal.        Result Review :       Common labs        11/4/2022    11:36 11/12/2022    14:53 11/12/2022    15:50   Common Labs   Glucose 82    91     BUN 15    22     Creatinine 1.11    1.35     Sodium 134    140     Potassium 4.6    4.4     Chloride 99    102     Calcium 9.6    9.1     Albumin 4.20    4.50     Total Bilirubin 0.5    0.3     Alkaline Phosphatase 50    53     AST (SGOT) 39    36     ALT (SGPT) 43    46     WBC 7.76   9.37      Hemoglobin 15.8   16.9      Hematocrit 45.8   49.0      Platelets 187   198      Total Cholesterol 233       Triglycerides 181       HDL Cholesterol 50     "   LDL Cholesterol  150       PSA 1.590           Results for orders placed or performed in visit on 03/07/23   Duplex Venous Lower Extremity - Left CAR   Result Value Ref Range    Target HR (85%) 139 bpm    Max. Pred. HR (100%) 164 bpm    Left Saphenofemoral Junction Spont 1.0     Left Greater Saph AK Vessel 1.0     Left Greater Saph BK Vessel 1.0     Right Common Femoral Spont Y     Right Common Femoral Competent Y     Right Common Femoral Phasic Y     Right Common Femoral Compress C     Right Common Femoral Augment Y     Left Common Femoral Spont Y     Left Common Femoral Competent Y     Left Common Femoral Phasic Y     Left Common Femoral Compress C     Left Common Femoral Augment Y     Left Saphenofemoral Junction Competent N     Left Saphenofemoral Junction Compress C     Left Proximal Femoral Compress C     Left Mid Femoral Spont Y     Left Mid Femoral Competent Y     Left Mid Femoral Phasic Y     Left Mid Femoral Compress C     Left Mid Femoral Augment Y     Left Distal Femoral Compress C     Left Popliteal Spont Y     Left Popliteal Competent Y     Left Popliteal Phasic Y     Left Popliteal Compress C     Left Popliteal Augment Y     Left Posterior Tibial Compress C     Left Peroneal Compress C     Left Gastronemius Compress C     Left Greater Saph AK Compress N     Left Greater Saph AK Thrombus A     Left Greater Saph BK Compress N     Left Greater Saph BK Thrombus A     Left Lesser Saph Compress C     BH CV VAS PRELIMINARY FINDINGS SCRIPTING 1.0             Procedures        Assessment and Plan    Diagnoses and all orders for this visit:    1. Hypogonadism in male (Primary)  Comments:  doing well on current dose, check labs and adjust as needed  Orders:  -     Testosterone Cypionate (DEPOTESTOTERONE CYPIONATE) 200 MG/ML injection; Inject 1 mL into the appropriate muscle as directed by prescriber Every 7 (Seven) Days.  Dispense: 4 mL; Refill: 2  -     Testosterone; Future    2. Screening PSA (prostate  specific antigen)  -     PSA SCREENING; Future    3. Primary hypertension  Comments:  His blood pressure looks great today.  No medication changes.  We will monitor  Orders:  -     Comprehensive Metabolic Panel  -     CBC & Differential  -     TSH  -     Lipid Panel    4. Mixed hyperlipidemia  Comments:  We will check his cholesterol today   Orders:  -     Comprehensive Metabolic Panel  -     CBC & Differential  -     TSH  -     Lipid Panel    5. Anxiety    6. Chronic gout without tophus, unspecified cause, unspecified site  Comments:    he will stay on the allopurinol, but we will check uric acid level.  we may give him some colchicine or some type of acute treatment if his level are high  Orders:  -     Uric acid; Future    7. Gastroesophageal reflux disease, unspecified whether esophagitis present        {BMI is >= 30 and <35. (Class 1 Obesity). The following options were offered after discussion;: exercise counseling/recommendations and nutrition counseling/recommendations            Follow Up   Return in about 3 months (around 8/5/2023).  Patient was given instructions and counseling regarding his condition or for health maintenance advice. Please see specific information pulled into the AVS if appropriate.     Transcribed from ambient dictation for Vielka Mauricio MD by Rosemarie Carrasquillo.  05/05/23   13:11 EDT    Patient or patient representative verbalized consent to the visit recording.

## 2023-05-07 NOTE — PROGRESS NOTES
"Chief Complaint  Follow-up of the Left Elbow and Follow-up of the Left Shoulder     Subjective      Jose Louie presents to [facility] for follow-up of left shoulder and elbow. He is here for follow-up after his rotator cuff repair. He said the other day it was swelling a lot and today it has improved. This is resolving and he is on keflex.     No Known Allergies     Social History     Socioeconomic History   • Marital status:    Tobacco Use   • Smoking status: Never   • Smokeless tobacco: Never   Vaping Use   • Vaping Use: Never used   Substance and Sexual Activity   • Alcohol use: Yes     Comment: socially   • Drug use: Never   • Sexual activity: Defer        Review of Systems     Objective   Vital Signs:   Ht 175.3 cm (69\")   Wt 98.9 kg (218 lb)   BMI 32.19 kg/m²       Physical Exam  Constitutional:       Appearance: Normal appearance. Patient is well-developed and normal weight.   HENT:      Head: Normocephalic.      Right Ear: Hearing and external ear normal.      Left Ear: Hearing and external ear normal.      Nose: Nose normal.   Eyes:      Conjunctiva/sclera: Conjunctivae normal.   Cardiovascular:      Rate and Rhythm: Normal rate.   Pulmonary:      Effort: Pulmonary effort is normal.      Breath sounds: No wheezing or rales.   Abdominal:      Palpations: Abdomen is soft.      Tenderness: There is no abdominal tenderness.   Musculoskeletal:      Cervical back: Normal range of motion.   Skin:     Findings: No rash.   Neurological:      Mental Status: Patient is alert and oriented to person, place, and time.   Psychiatric:         Mood and Affect: Mood and affect normal.         Judgment: Judgment normal.       Ortho Exam      Sensation intact. Pulses normal. Minimal ROM secondary to repair.       Procedures      Imaging Results (Most Recent)     None           Result Review :         XR Chest 1 View    Result Date: 4/26/2023  Narrative: PROCEDURE: XR CHEST 1 VW "  COMPARISON: [facility], , CHEST AP/PA 1 VIEW, 4/22/2013, 14:48.  INDICATIONS: LOW O2 SATS POST LEFT SHOULDER BLOCK/SURGERY  FINDINGS:  The cardiac silhouette is within normal limits.  Pulmonary vascularity appears normal.  There is no focal airspace consolidation, pleural effusion, or pneumothorax.  There are no acute osseous findings of the chest.      Impression:   1. No acute cardiopulmonary abnormality.        MICHA OLVERA MD       Electronically Signed and Approved By: MICHA OLVERA MD on 4/26/2023 at 13:24             Peripheral Block    Result Date: 4/26/2023  Narrative: Quintin Saba MD     4/26/2023  8:32 AM Peripheral Block Pre-sedation assessment completed: 4/26/2023 8:19 AM Patient reassessed immediately prior to procedure Patient location during procedure: pre-op Start time: 4/26/2023 8:19 AM Stop time: 4/26/2023 8:28 AM Reason for block: at surgeon's request and post-op pain management Performed by Anesthesiologist: Quintin Saba MD Preanesthetic Checklist Completed: patient identified, IV checked, site marked, risks and benefits discussed, surgical consent, monitors and equipment checked, pre-op evaluation and timeout performed Prep: Pt Position: sitting Prep: ChloraPrep Patient monitoring: blood pressure monitoring, continuous pulse oximetry and EKG Procedure Sedation: yes Performed under: local infiltration Guidance:ultrasound guided ULTRASOUND INTERPRETATION.  Using ultrasound guidance a 20 G gauge needle was placed in close proximity to the nerve, at which point, under ultrasound guidance anesthetic was injected in the area of the nerve and spread of the anesthesia was seen on ultrasound in close proximity thereto.  There were no abnormalities seen on ultrasound; a digital image was taken; and the patient tolerated the procedure with no complications. Images:still images obtained, printed/placed on chart Laterality:left Block Type:interscalene Injection  Technique:single-shot Needle Type:echogenic Resistance on Injection: none Post Assessment Injection Assessment: negative aspiration for heme, no paresthesia on injection and incremental injection Patient Tolerance:comfortable throughout block Complications:no              Assessment and Plan     Diagnoses and all orders for this visit:    1. Status post left rotator cuff repair (Primary)        Patient will continue with Keflex prescription  And follow-up for his two week appointment scheduled next week for removal of sutures.     Call or return if worsening symptoms.    Follow Up     Next week      Patient was given instructions and counseling regarding his condition or for health maintenance advice. Please see specific information pulled into the AVS if appropriate.     Scribed for Elver Richards MD by Shonda Martinez.  05/07/23   13:43 EDT    I have personally performed the services described in this document as scribed by the above individual and it is both accurate and complete. Elver Richards MD 05/08/23

## 2023-05-09 ENCOUNTER — OFFICE VISIT (OUTPATIENT)
Dept: ORTHOPEDIC SURGERY | Facility: CLINIC | Age: 58
End: 2023-05-09
Payer: COMMERCIAL

## 2023-05-09 VITALS — HEART RATE: 91 BPM | HEIGHT: 69 IN | BODY MASS INDEX: 33.03 KG/M2 | WEIGHT: 223 LBS | OXYGEN SATURATION: 95 %

## 2023-05-09 DIAGNOSIS — Z98.890 S/P ARTHROSCOPY OF LEFT SHOULDER: Primary | ICD-10-CM

## 2023-05-09 NOTE — PATIENT INSTRUCTIONS
Sutures removed in office today.  Patient educated on incision care. May shower, but do not submerge in water until fully healed. Do not apply creams or lotions.      Educated that if his olecranon bursitis resolves and he has no issues to continue sling for the next 4 weeks. May remove for hygiene.     Advised on pendulums, gentle range of motion exercises to the elbow, wrist, and hand/fingers. No active range of motion of the shoulder. No lifting, pushing, or pulling.     Order sent for PT/OT to start passive stretching/gentle range of motion only.     Follow-up in 4 weeks. No x-rays needed.   Call with any changes or concerns.

## 2023-05-17 ENCOUNTER — LAB (OUTPATIENT)
Dept: LAB | Facility: HOSPITAL | Age: 58
End: 2023-05-17
Payer: COMMERCIAL

## 2023-05-17 DIAGNOSIS — M1A.9XX0 CHRONIC GOUT WITHOUT TOPHUS, UNSPECIFIED CAUSE, UNSPECIFIED SITE: ICD-10-CM

## 2023-05-17 DIAGNOSIS — E29.1 HYPOGONADISM IN MALE: ICD-10-CM

## 2023-05-17 DIAGNOSIS — Z12.5 SCREENING PSA (PROSTATE SPECIFIC ANTIGEN): ICD-10-CM

## 2023-05-17 DIAGNOSIS — I10 PRIMARY HYPERTENSION: ICD-10-CM

## 2023-05-17 DIAGNOSIS — M1A.0710 CHRONIC GOUT OF RIGHT ANKLE, UNSPECIFIED CAUSE: ICD-10-CM

## 2023-05-17 LAB
PSA SERPL-MCNC: 1.65 NG/ML (ref 0–4)
TESTOST SERPL-MCNC: 331 NG/DL (ref 193–740)
URATE SERPL-MCNC: 7.5 MG/DL (ref 3.4–7)

## 2023-05-17 PROCEDURE — 84550 ASSAY OF BLOOD/URIC ACID: CPT

## 2023-05-17 PROCEDURE — G0103 PSA SCREENING: HCPCS

## 2023-05-17 PROCEDURE — 36415 COLL VENOUS BLD VENIPUNCTURE: CPT

## 2023-05-17 PROCEDURE — 84403 ASSAY OF TOTAL TESTOSTERONE: CPT

## 2023-05-18 DIAGNOSIS — E29.1 HYPOGONADISM IN MALE: Primary | ICD-10-CM

## 2023-05-18 RX ORDER — ALLOPURINOL 100 MG/1
200 TABLET ORAL DAILY
Qty: 180 TABLET | Refills: 1 | Status: SHIPPED | OUTPATIENT
Start: 2023-05-18

## 2023-05-18 RX ORDER — COLCHICINE 0.6 MG/1
0.6 TABLET ORAL DAILY
Qty: 10 TABLET | Refills: 1 | Status: SHIPPED | OUTPATIENT
Start: 2023-05-18

## 2023-05-22 RX ORDER — ESCITALOPRAM OXALATE 5 MG/1
5 TABLET ORAL DAILY
Qty: 90 TABLET | Refills: 0 | Status: SHIPPED | OUTPATIENT
Start: 2023-05-22

## 2023-05-22 RX ORDER — OLMESARTAN MEDOXOMIL 40 MG/1
40 TABLET ORAL DAILY
Qty: 90 TABLET | Refills: 1 | Status: SHIPPED | OUTPATIENT
Start: 2023-05-22

## 2023-06-02 ENCOUNTER — OFFICE VISIT (OUTPATIENT)
Dept: ORTHOPEDIC SURGERY | Facility: CLINIC | Age: 58
End: 2023-06-02

## 2023-06-02 VITALS — WEIGHT: 223 LBS | OXYGEN SATURATION: 95 % | HEART RATE: 79 BPM | HEIGHT: 69 IN | BODY MASS INDEX: 33.03 KG/M2

## 2023-06-02 DIAGNOSIS — Z98.890 S/P ARTHROSCOPY OF LEFT SHOULDER: Primary | ICD-10-CM

## 2023-06-02 PROCEDURE — 99024 POSTOP FOLLOW-UP VISIT: CPT

## 2023-06-02 NOTE — PROGRESS NOTES
"Chief Complaint  Follow-up of the Left Shoulder    Subjective      Jose Louie presents to [facility] for 6-week follow-up of left shoulder arthroscopy with rotator cuff repair, subacromial decompression and distal claviculectomy with Dr. Richards on 4/26/2023.  Patient presents today without use of his sling.  He has discontinued the sling at approximately 1 to 2 weeks after surgery due to elbow pain.  He has been doing very well.  He is in physical therapy  at [facility] and is working on active assisted range of motion.  He is not having any pain.    Objective   No Known Allergies    Vital Signs:   Pulse 79   Ht 175.3 cm (69\")   Wt 101 kg (223 lb)   SpO2 95%   BMI 32.93 kg/m²       Physical Exam    Constitutional: Awake, alert. Well nourished appearance.    Integumentary: Warm, dry, intact. No obvious rashes.    HENT: Atraumatic, normocephalic.   Respiratory: Non labored respirations .   Cardiovascular: Intact peripheral pulses.    Psychiatric: Normal mood and affect. A&O X3    Ortho Exam  Left shoulder: Incisions are completely healed and well approximated.  There is no redness, drainage or tenderness to the touch.  Active assisted range of motion forward flexion to 110 degrees, abduction 95 degrees, internal rotation to beltline, external rotation 40 degrees.  Full range of motion of the elbow and wrist.  Sensation is intact.    Imaging Results (Most Recent)     None                    Assessment and Plan   Problem List Items Addressed This Visit    None  Visit Diagnoses     S/P arthroscopy of left shoulder with rotator cuff repair, subacromial decompression and distal claviculectomy    -  Primary    Relevant Orders    Ambulatory Referral to Physical Therapy Evaluate and treat, POST OP; Stretching, ROM, Strengthening (Completed)          Follow Up   Return in about 6 weeks (around 7/14/2023).    Patient is a non-smoker, did not discuss options for smoking cessation.     Social " History     Socioeconomic History   • Marital status:    Tobacco Use   • Smoking status: Never   • Smokeless tobacco: Never   • Tobacco comments:     History of dip tobacco   Vaping Use   • Vaping Use: Never used   Substance and Sexual Activity   • Alcohol use: Yes     Comment: socially   • Drug use: Never   • Sexual activity: Yes     Partners: Female     Birth control/protection: Vasectomy, Same-sex partner       Patient Instructions   Patient may discontinue sling use. Advised still no active ROM of the shoulder, until directed by PT. continue physical therapy and patient may start active assist ROM/exercises. No lifting, pushing, or pulling. Nothing heavier than 1 lb in affected arm.    Continue icing shoulder up to 3-4 times daily for no longer than 15 to 20 minutes at a time as needed for pain or swelling.    Follow-up in 6 weeks.  Call with changes or concerns.  No imaging.    Patient was given instructions and counseling regarding his condition or for health maintenance advice. Please see specific information pulled into the AVS if appropriate.

## 2023-06-02 NOTE — PATIENT INSTRUCTIONS
Patient may discontinue sling use. Advised still no active ROM of the shoulder, until directed by PT. continue physical therapy and patient may start active assist ROM/exercises. No lifting, pushing, or pulling. Nothing heavier than 1 lb in affected arm.    Continue icing shoulder up to 3-4 times daily for no longer than 15 to 20 minutes at a time as needed for pain or swelling.    Follow-up in 6 weeks.  Call with changes or concerns.  No imaging.

## 2023-08-13 DIAGNOSIS — E29.1 HYPOGONADISM IN MALE: ICD-10-CM

## 2023-08-14 RX ORDER — TESTOSTERONE CYPIONATE 200 MG/ML
200 INJECTION, SOLUTION INTRAMUSCULAR
Qty: 4 ML | Refills: 2 | Status: SHIPPED | OUTPATIENT
Start: 2023-08-14

## 2023-08-14 NOTE — TELEPHONE ENCOUNTER
Last follow up visit date: 5/5/23    Last urine drug screen date: 11/4/22    Last consent/contract date:11/08/22    Does patient utilize [facility] pharmacy (yes or no)? NO

## 2023-08-21 ENCOUNTER — OFFICE VISIT (OUTPATIENT)
Dept: INTERNAL MEDICINE | Facility: CLINIC | Age: 58
End: 2023-08-21
Payer: COMMERCIAL

## 2023-08-21 VITALS
TEMPERATURE: 98.5 F | HEART RATE: 82 BPM | SYSTOLIC BLOOD PRESSURE: 140 MMHG | DIASTOLIC BLOOD PRESSURE: 60 MMHG | HEIGHT: 69 IN | WEIGHT: 223.8 LBS | BODY MASS INDEX: 33.15 KG/M2 | OXYGEN SATURATION: 97 % | RESPIRATION RATE: 14 BRPM

## 2023-08-21 DIAGNOSIS — E06.3 HYPOTHYROIDISM DUE TO HASHIMOTO'S THYROIDITIS: ICD-10-CM

## 2023-08-21 DIAGNOSIS — E29.1 HYPOGONADISM IN MALE: ICD-10-CM

## 2023-08-21 DIAGNOSIS — E03.8 HYPOTHYROIDISM DUE TO HASHIMOTO'S THYROIDITIS: ICD-10-CM

## 2023-08-21 DIAGNOSIS — E78.2 MIXED HYPERLIPIDEMIA: ICD-10-CM

## 2023-08-21 DIAGNOSIS — I10 PRIMARY HYPERTENSION: Primary | ICD-10-CM

## 2023-08-21 DIAGNOSIS — F41.9 ANXIETY: ICD-10-CM

## 2023-08-21 DIAGNOSIS — M1A.0710 CHRONIC GOUT OF RIGHT ANKLE, UNSPECIFIED CAUSE: ICD-10-CM

## 2023-08-21 DIAGNOSIS — R00.2 PALPITATION: ICD-10-CM

## 2023-08-21 LAB
ALBUMIN SERPL-MCNC: 4.3 G/DL (ref 3.5–5.2)
ALBUMIN/GLOB SERPL: 1.6 G/DL
ALP SERPL-CCNC: 67 U/L (ref 39–117)
ALT SERPL W P-5'-P-CCNC: 40 U/L (ref 1–41)
ANION GAP SERPL CALCULATED.3IONS-SCNC: 9.3 MMOL/L (ref 5–15)
AST SERPL-CCNC: 39 U/L (ref 1–40)
BASOPHILS # BLD AUTO: 0.03 10*3/MM3 (ref 0–0.2)
BASOPHILS NFR BLD AUTO: 0.4 % (ref 0–1.5)
BILIRUB SERPL-MCNC: 0.6 MG/DL (ref 0–1.2)
BUN SERPL-MCNC: 16 MG/DL (ref 6–20)
BUN/CREAT SERPL: 11.8 (ref 7–25)
CALCIUM SPEC-SCNC: 9.7 MG/DL (ref 8.6–10.5)
CHLORIDE SERPL-SCNC: 102 MMOL/L (ref 98–107)
CHOLEST SERPL-MCNC: 221 MG/DL (ref 0–200)
CO2 SERPL-SCNC: 26.7 MMOL/L (ref 22–29)
CREAT SERPL-MCNC: 1.36 MG/DL (ref 0.76–1.27)
DEPRECATED RDW RBC AUTO: 43.8 FL (ref 37–54)
EGFRCR SERPLBLD CKD-EPI 2021: 60.7 ML/MIN/1.73
EOSINOPHIL # BLD AUTO: 0.11 10*3/MM3 (ref 0–0.4)
EOSINOPHIL NFR BLD AUTO: 1.6 % (ref 0.3–6.2)
ERYTHROCYTE [DISTWIDTH] IN BLOOD BY AUTOMATED COUNT: 13 % (ref 12.3–15.4)
GLOBULIN UR ELPH-MCNC: 2.7 GM/DL
GLUCOSE SERPL-MCNC: 98 MG/DL (ref 65–99)
HCT VFR BLD AUTO: 48.5 % (ref 37.5–51)
HDLC SERPL-MCNC: 37 MG/DL (ref 40–60)
HGB BLD-MCNC: 16.4 G/DL (ref 13–17.7)
IMM GRANULOCYTES # BLD AUTO: 0.03 10*3/MM3 (ref 0–0.05)
IMM GRANULOCYTES NFR BLD AUTO: 0.4 % (ref 0–0.5)
LDLC SERPL CALC-MCNC: 145 MG/DL (ref 0–100)
LDLC/HDLC SERPL: 3.82 {RATIO}
LYMPHOCYTES # BLD AUTO: 0.98 10*3/MM3 (ref 0.7–3.1)
LYMPHOCYTES NFR BLD AUTO: 14.5 % (ref 19.6–45.3)
MCH RBC QN AUTO: 31.2 PG (ref 26.6–33)
MCHC RBC AUTO-ENTMCNC: 33.8 G/DL (ref 31.5–35.7)
MCV RBC AUTO: 92.4 FL (ref 79–97)
MONOCYTES # BLD AUTO: 0.58 10*3/MM3 (ref 0.1–0.9)
MONOCYTES NFR BLD AUTO: 8.6 % (ref 5–12)
NEUTROPHILS NFR BLD AUTO: 5.05 10*3/MM3 (ref 1.7–7)
NEUTROPHILS NFR BLD AUTO: 74.5 % (ref 42.7–76)
NRBC BLD AUTO-RTO: 0 /100 WBC (ref 0–0.2)
PLATELET # BLD AUTO: 205 10*3/MM3 (ref 140–450)
PMV BLD AUTO: 12.1 FL (ref 6–12)
POTASSIUM SERPL-SCNC: 4.2 MMOL/L (ref 3.5–5.2)
PROT SERPL-MCNC: 7 G/DL (ref 6–8.5)
PSA SERPL-MCNC: 1.35 NG/ML (ref 0–4)
RBC # BLD AUTO: 5.25 10*6/MM3 (ref 4.14–5.8)
SODIUM SERPL-SCNC: 138 MMOL/L (ref 136–145)
T4 FREE SERPL-MCNC: 1.07 NG/DL (ref 0.93–1.7)
TRIGL SERPL-MCNC: 213 MG/DL (ref 0–150)
TSH SERPL DL<=0.05 MIU/L-ACNC: 2.34 UIU/ML (ref 0.27–4.2)
URATE SERPL-MCNC: 5.8 MG/DL (ref 3.4–7)
VLDLC SERPL-MCNC: 39 MG/DL (ref 5–40)
WBC NRBC COR # BLD: 6.78 10*3/MM3 (ref 3.4–10.8)

## 2023-08-21 PROCEDURE — 84439 ASSAY OF FREE THYROXINE: CPT | Performed by: INTERNAL MEDICINE

## 2023-08-21 PROCEDURE — 82172 ASSAY OF APOLIPOPROTEIN: CPT | Performed by: INTERNAL MEDICINE

## 2023-08-21 PROCEDURE — 84402 ASSAY OF FREE TESTOSTERONE: CPT | Performed by: INTERNAL MEDICINE

## 2023-08-21 PROCEDURE — 84550 ASSAY OF BLOOD/URIC ACID: CPT | Performed by: INTERNAL MEDICINE

## 2023-08-21 PROCEDURE — 80061 LIPID PANEL: CPT | Performed by: INTERNAL MEDICINE

## 2023-08-21 PROCEDURE — 80050 GENERAL HEALTH PANEL: CPT | Performed by: INTERNAL MEDICINE

## 2023-08-21 PROCEDURE — 99214 OFFICE O/P EST MOD 30 MIN: CPT | Performed by: INTERNAL MEDICINE

## 2023-08-21 PROCEDURE — 84403 ASSAY OF TOTAL TESTOSTERONE: CPT | Performed by: INTERNAL MEDICINE

## 2023-08-21 PROCEDURE — 84153 ASSAY OF PSA TOTAL: CPT | Performed by: INTERNAL MEDICINE

## 2023-08-21 RX ORDER — ESCITALOPRAM OXALATE 10 MG/1
10 TABLET ORAL DAILY
Qty: 90 TABLET | Refills: 1 | Status: SHIPPED | OUTPATIENT
Start: 2023-08-21

## 2023-08-21 NOTE — PROGRESS NOTES
"Chief Complaint  Follow-up (3 month follow up)    Subjective      Jose Louie presents to [facility]  History of Present Illness    States that he is doing well since his shoulder surgery  He is trying to be more active  His started back at [employer] and doing well    Noticing a little more irritability  Therefore increase Lexapro to 10 mg  He has done this for about 2 weeks now feels like it may be helping a little he is trying to pay attention to behaviors    HTN-  140 at the most and bottom number is usually around 60's  No chest pain  Breathing well    Reviewed ultrasound which showed superficial thrombophlebitis  No symptoms of this any longer  Taking aspirin for this      Objective   Vital Signs:   /60   Pulse 82   Temp 98.5 øF (36.9 øC) (Temporal)   Resp 14   Ht 175.3 cm (69.02\")   Wt 102 kg (223 lb 12.8 oz)   SpO2 97%   BMI 33.03 kg/mý     Wt Readings from Last 3 Encounters:   08/21/23 102 kg (223 lb 12.8 oz)   07/18/23 101 kg (223 lb)   06/02/23 101 kg (223 lb)     BP Readings from Last 3 Encounters:   08/21/23 140/60   07/18/23 138/80   05/05/23 124/70         Physical Exam  Vitals reviewed.   Constitutional:       Appearance: Normal appearance. He is well-developed.   HENT:      Head: Normocephalic and atraumatic.      Right Ear: External ear normal.      Left Ear: External ear normal.   Eyes:      Conjunctiva/sclera: Conjunctivae normal.      Pupils: Pupils are equal, round, and reactive to light.   Cardiovascular:      Rate and Rhythm: Normal rate and regular rhythm.      Heart sounds: No murmur heard.    No friction rub. No gallop.   Pulmonary:      Effort: Pulmonary effort is normal.      Breath sounds: Normal breath sounds. No wheezing or rhonchi.   Skin:     General: Skin is warm and dry.   Neurological:      Mental Status: He is alert and oriented to person, place, and time.   Psychiatric:         Mood and Affect: Affect normal. "         Behavior: Behavior normal.         Thought Content: Thought content normal.        Result Review :  The following data was reviewed by: Vielka Mauricio MD on 08/21/2023:    LABS      Common labs          11/12/2022    14:53 11/12/2022    15:50 5/5/2023    10:50 5/17/2023    06:47   Common Labs   Glucose  91  74     BUN  22  17     Creatinine  1.35  1.09     Sodium  140  138     Potassium  4.4  4.8     Chloride  102  102     Calcium  9.1  9.3     Albumin  4.50  4.0     Total Bilirubin  0.3  0.2     Alkaline Phosphatase  53  66     AST (SGOT)  36  26     ALT (SGPT)  46  26     WBC 9.37   8.97     Hemoglobin 16.9   14.7     Hematocrit 49.0   43.9     Platelets 198   313     Total Cholesterol   219     Triglycerides   121     HDL Cholesterol   39     LDL Cholesterol    158     PSA    1.650    Uric Acid    7.5        No visits with results within 1 Month(s) from this visit.   Latest known visit with results is:   Lab on 05/17/2023   Component Date Value    Testosterone, Total 05/17/2023 331.00     Uric Acid 05/17/2023 7.5 (H)     PSA 05/17/2023 1.650        Lab Results   Component Value Date    COVID19 NOT DETECTED 01/30/2021    BILIRUBINUR Negative 11/12/2022        IMAGING  XR Chest 1 View    Result Date: 4/26/2023    1. No acute cardiopulmonary abnormality.        MICHA OLVERA MD       Electronically Signed and Approved By: MICHA OLVERA MD on 4/26/2023 at 13:24               Procedures         HEALTH MAINTENANCE            Social History     Tobacco Use   Smoking Status Never   Smokeless Tobacco Never   Tobacco Comments    History of dip tobacco              Assessment and Plan   Diagnoses and all orders for this visit:    1. Primary hypertension (Primary)  Comments:  On the higher side will monitor closely  Orders:  -     Comprehensive Metabolic Panel  -     Cancel: CBC & Differential  -     TSH    2. Mixed hyperlipidemia  Comments:  He is interested in trying Crestor again we will check labs and  prescribe if needed  Orders:  -     Lipid Panel  -     Apolipoprotein B; Future  -     Apolipoprotein B    3. Hypogonadism in male  Comments:  Will check testosterone levels and PSA  Orders:  -     Cancel: Testosterone, Free, Total  -     PSA DIAGNOSTIC ONLY; Future  -     Testosterone, Free, Total  -     CBC & Differential  -     PSA DIAGNOSTIC ONLY    4. Anxiety  Comments:  Doing well on increase Lexapro continue for now    5. Chronic gout of right ankle, unspecified cause  Comments:  Will check uric acid level and adjust as needed  Orders:  -     Uric acid; Future  -     Uric acid    6. Palpitation  Comments:  We will get echo especially because of slightly widened pulse pressure  Orders:  -     Adult Transthoracic Echo Complete w/ Color, Spectral and Contrast if necessary per protocol; Future    7. Hypothyroidism due to Hashimoto's thyroiditis  Comments:  We will check levels and adjust as needed  Orders:  -     T4, Free    Other orders  -     escitalopram (Lexapro) 10 MG tablet; Take 1 tablet by mouth Daily.  Dispense: 90 tablet; Refill: 1              FOLLOW UP  No follow-ups on file.  Patient was given instructions and counseling regarding his condition or for health maintenance advice. Please see specific information pulled into the AVS if appropriate.       Vielka Mauricio MD  08/21/23  10:00 EDT    CURRENT & DISCONTINUED MEDICATIONS  Current Outpatient Medications   Medication Instructions    allopurinol (ZYLOPRIM) 200 mg, Oral, Daily    econazole nitrate (SPECTAZOLE) 1 % cream Apply to affected area on arms everyday as needed    escitalopram (LEXAPRO) 10 mg, Oral, Daily    levothyroxine (SYNTHROID, LEVOTHROID) 150 mcg, Oral, Daily    olmesartan (BENICAR) 40 mg, Oral, Daily    pantoprazole (PROTONIX) 40 MG EC tablet take 1 tablet by mouth daily    Testosterone Cypionate (DEPOTESTOTERONE CYPIONATE) 200 mg, Intramuscular, Every 7 Days       Medications Discontinued During This Encounter   Medication  Reason    colchicine 0.6 MG tablet     terbinafine (lamiSIL) 250 MG tablet     escitalopram (Lexapro) 5 MG tablet Reorder

## 2023-08-24 LAB — APO B SERPL-MCNC: 123 MG/DL

## 2023-08-25 RX ORDER — ROSUVASTATIN CALCIUM 5 MG/1
5 TABLET, COATED ORAL DAILY
Qty: 90 TABLET | Refills: 1 | Status: SHIPPED | OUTPATIENT
Start: 2023-08-25

## 2023-08-29 LAB
TESTOST FREE SERPL-MCNC: 19.4 PG/ML (ref 7.2–24)
TESTOST SERPL-MCNC: 662 NG/DL (ref 264–916)

## 2023-09-21 ENCOUNTER — TELEPHONE (OUTPATIENT)
Dept: INTERNAL MEDICINE | Facility: CLINIC | Age: 58
End: 2023-09-21

## 2023-09-21 NOTE — TELEPHONE ENCOUNTER
Caller: [facility]    Relationship: Other    Best call back number: [phone]     What form or medical record are you requesting: COPY OF ECHO     Who is requesting this form or medical record from you: RELEASEPOINT    How would you like to receive the form or medical records (pick-up, mail, fax):     If fax, what is the fax number:[phone]    Timeframe paperwork needed: ASAP     Additional notes: RELEASEPOINT IS REQUESTING A COPY OF ECHO

## 2023-11-13 DIAGNOSIS — E29.1 HYPOGONADISM IN MALE: ICD-10-CM

## 2023-11-13 NOTE — TELEPHONE ENCOUNTER
Last follow up visit date: 8/21/23    Last urine drug screen date: 11/4/22    Last consent/contract date: 11/8/22    Does patient utilize [facility] pharmacy (yes or no)? [facility] 8/14/23

## 2023-11-14 RX ORDER — TESTOSTERONE CYPIONATE 200 MG/ML
200 INJECTION, SOLUTION INTRAMUSCULAR
Qty: 4 ML | Refills: 2 | Status: SHIPPED | OUTPATIENT
Start: 2023-11-14

## 2023-11-27 RX ORDER — LEVOTHYROXINE SODIUM 0.15 MG/1
150 TABLET ORAL DAILY
Qty: 90 TABLET | Refills: 3 | Status: SHIPPED | OUTPATIENT
Start: 2023-11-27

## 2023-11-27 RX ORDER — OLMESARTAN MEDOXOMIL 40 MG/1
40 TABLET ORAL DAILY
Qty: 90 TABLET | Refills: 1 | Status: SHIPPED | OUTPATIENT
Start: 2023-11-27

## 2023-12-15 ENCOUNTER — OFFICE VISIT (OUTPATIENT)
Dept: INTERNAL MEDICINE | Facility: CLINIC | Age: 58
End: 2023-12-15
Payer: COMMERCIAL

## 2023-12-15 VITALS
BODY MASS INDEX: 33.62 KG/M2 | HEART RATE: 80 BPM | WEIGHT: 227 LBS | TEMPERATURE: 97.6 F | SYSTOLIC BLOOD PRESSURE: 132 MMHG | OXYGEN SATURATION: 96 % | DIASTOLIC BLOOD PRESSURE: 84 MMHG | HEIGHT: 69 IN

## 2023-12-15 DIAGNOSIS — I10 PRIMARY HYPERTENSION: Primary | ICD-10-CM

## 2023-12-15 DIAGNOSIS — M1A.0710 CHRONIC GOUT OF RIGHT ANKLE, UNSPECIFIED CAUSE: ICD-10-CM

## 2023-12-15 DIAGNOSIS — E29.1 HYPOGONADISM IN MALE: ICD-10-CM

## 2023-12-15 DIAGNOSIS — E03.8 HYPOTHYROIDISM DUE TO HASHIMOTO'S THYROIDITIS: ICD-10-CM

## 2023-12-15 DIAGNOSIS — E06.3 HYPOTHYROIDISM DUE TO HASHIMOTO'S THYROIDITIS: ICD-10-CM

## 2023-12-15 DIAGNOSIS — E78.2 MIXED HYPERLIPIDEMIA: ICD-10-CM

## 2023-12-15 LAB
ALBUMIN SERPL-MCNC: 4.4 G/DL (ref 3.5–5.2)
ALBUMIN/GLOB SERPL: 3.4 G/DL
ALP SERPL-CCNC: 76 U/L (ref 39–117)
ALT SERPL W P-5'-P-CCNC: 38 U/L (ref 1–41)
ANION GAP SERPL CALCULATED.3IONS-SCNC: 11.6 MMOL/L (ref 5–15)
AST SERPL-CCNC: 31 U/L (ref 1–40)
BASOPHILS # BLD AUTO: 0.02 10*3/MM3 (ref 0–0.2)
BASOPHILS NFR BLD AUTO: 0.3 % (ref 0–1.5)
BILIRUB SERPL-MCNC: 0.3 MG/DL (ref 0–1.2)
BUN SERPL-MCNC: 19 MG/DL (ref 6–20)
BUN/CREAT SERPL: 11.9 (ref 7–25)
CALCIUM SPEC-SCNC: 9.6 MG/DL (ref 8.6–10.5)
CHLORIDE SERPL-SCNC: 103 MMOL/L (ref 98–107)
CHOLEST SERPL-MCNC: 228 MG/DL (ref 0–200)
CO2 SERPL-SCNC: 25.4 MMOL/L (ref 22–29)
CREAT SERPL-MCNC: 1.59 MG/DL (ref 0.76–1.27)
DEPRECATED RDW RBC AUTO: 44.1 FL (ref 37–54)
EGFRCR SERPLBLD CKD-EPI 2021: 50.3 ML/MIN/1.73
EOSINOPHIL # BLD AUTO: 0.14 10*3/MM3 (ref 0–0.4)
EOSINOPHIL NFR BLD AUTO: 1.9 % (ref 0.3–6.2)
ERYTHROCYTE [DISTWIDTH] IN BLOOD BY AUTOMATED COUNT: 13 % (ref 12.3–15.4)
GLOBULIN UR ELPH-MCNC: 1.3 GM/DL
GLUCOSE SERPL-MCNC: 80 MG/DL (ref 65–99)
HCT VFR BLD AUTO: 46.7 % (ref 37.5–51)
HDLC SERPL-MCNC: 34 MG/DL (ref 40–60)
HGB BLD-MCNC: 16.1 G/DL (ref 13–17.7)
IMM GRANULOCYTES # BLD AUTO: 0.03 10*3/MM3 (ref 0–0.05)
IMM GRANULOCYTES NFR BLD AUTO: 0.4 % (ref 0–0.5)
LDLC SERPL CALC-MCNC: 141 MG/DL (ref 0–100)
LDLC/HDLC SERPL: 4 {RATIO}
LYMPHOCYTES # BLD AUTO: 1.59 10*3/MM3 (ref 0.7–3.1)
LYMPHOCYTES NFR BLD AUTO: 21.8 % (ref 19.6–45.3)
MCH RBC QN AUTO: 32.4 PG (ref 26.6–33)
MCHC RBC AUTO-ENTMCNC: 34.5 G/DL (ref 31.5–35.7)
MCV RBC AUTO: 94 FL (ref 79–97)
MONOCYTES # BLD AUTO: 0.66 10*3/MM3 (ref 0.1–0.9)
MONOCYTES NFR BLD AUTO: 9.1 % (ref 5–12)
NEUTROPHILS NFR BLD AUTO: 4.85 10*3/MM3 (ref 1.7–7)
NEUTROPHILS NFR BLD AUTO: 66.5 % (ref 42.7–76)
NRBC BLD AUTO-RTO: 0 /100 WBC (ref 0–0.2)
PLATELET # BLD AUTO: 206 10*3/MM3 (ref 140–450)
PMV BLD AUTO: 12.5 FL (ref 6–12)
POTASSIUM SERPL-SCNC: 3.9 MMOL/L (ref 3.5–5.2)
PROT SERPL-MCNC: 5.7 G/DL (ref 6–8.5)
RBC # BLD AUTO: 4.97 10*6/MM3 (ref 4.14–5.8)
SODIUM SERPL-SCNC: 140 MMOL/L (ref 136–145)
T4 FREE SERPL-MCNC: 0.96 NG/DL (ref 0.93–1.7)
TRIGL SERPL-MCNC: 290 MG/DL (ref 0–150)
TSH SERPL DL<=0.05 MIU/L-ACNC: 2.68 UIU/ML (ref 0.27–4.2)
VLDLC SERPL-MCNC: 53 MG/DL (ref 5–40)
WBC NRBC COR # BLD AUTO: 7.29 10*3/MM3 (ref 3.4–10.8)

## 2023-12-15 PROCEDURE — 80061 LIPID PANEL: CPT | Performed by: INTERNAL MEDICINE

## 2023-12-15 PROCEDURE — 80050 GENERAL HEALTH PANEL: CPT | Performed by: INTERNAL MEDICINE

## 2023-12-15 PROCEDURE — 84439 ASSAY OF FREE THYROXINE: CPT | Performed by: INTERNAL MEDICINE

## 2023-12-15 PROCEDURE — 84402 ASSAY OF FREE TESTOSTERONE: CPT | Performed by: INTERNAL MEDICINE

## 2023-12-15 PROCEDURE — 84403 ASSAY OF TOTAL TESTOSTERONE: CPT | Performed by: INTERNAL MEDICINE

## 2023-12-15 RX ORDER — AZELAIC ACID 0.15 G/G
1 GEL TOPICAL 2 TIMES DAILY
Qty: 50 G | Refills: 1 | Status: SHIPPED | OUTPATIENT
Start: 2023-12-15

## 2023-12-15 RX ORDER — COLCHICINE 0.6 MG/1
TABLET ORAL
Qty: 90 TABLET | Refills: 1 | Status: SHIPPED | OUTPATIENT
Start: 2023-12-15

## 2023-12-15 NOTE — PROGRESS NOTES
"Chief Complaint  Primary hypertension (3 MONTH FOLLOW UP. Wants to get off allopurinol )    Subjective      History of Present Illness  Jose Louie is a 58 y.o. male who presents to [facility] with a past medical history of  Past Medical History:   Diagnosis Date    Acid reflux     Allergies     Anxiety     Arthritis     Back pain     CPAP use counseling 11/27/2017    Deafness     Frozen shoulder     Fungal toenail infection 03/04/2019    Heel pain     Hemorrhoid     HTN (hypertension) 12/08/2014    Hyperlipidemia 05/31/2017    Hypogonadism in male 12/08/2014    testicular failure    Hypothyroidism     Left shoulder pain     Low testosterone level in male 05/31/2017    Night sweats     Panic disorder 12/09/2014    Periarthritis of shoulder 1-4-23    Rotator cuff syndrome 1-4-23    Sleep apnea 11/27/2017     Usually notices gout flare in the ankle  Very interested in trying to go off of allopurinol    Did great with shoulder surgery    Did tolerate cholesterol meds well    Still exercising regularly  Blood pressure slightly elevated today        Objective   Vital Signs:   Vitals:    12/15/23 1124   BP: 132/84   Pulse: 80   Temp: 97.6 °F (36.4 °C)   TempSrc: Temporal   SpO2: 96%   Weight: 103 kg (227 lb)   Height: 175.3 cm (69.02\")     Body mass index is 33.51 kg/m².    Wt Readings from Last 3 Encounters:   12/15/23 103 kg (227 lb)   08/21/23 102 kg (223 lb 12.8 oz)   07/18/23 101 kg (223 lb)     BP Readings from Last 3 Encounters:   12/15/23 132/84   08/21/23 140/60   07/18/23 138/80       Health Maintenance   Topic Date Due    ZOSTER VACCINE (1 of 2) Never done    ANNUAL PHYSICAL  Never done    INFLUENZA VACCINE  08/01/2023    COVID-19 Vaccine (5 - 2023-24 season) 09/01/2023    BMI FOLLOWUP  06/02/2024    LIPID PANEL  12/15/2024    TDAP/TD VACCINES (3 - Td or Tdap) 07/21/2027    COLORECTAL CANCER SCREENING  01/20/2030    HEPATITIS C SCREENING  Completed    " Pneumococcal Vaccine 0-64  Aged Out       Physical Exam  Vitals reviewed.   Constitutional:       Appearance: Normal appearance. He is well-developed.   HENT:      Head: Normocephalic and atraumatic.      Right Ear: External ear normal.      Left Ear: External ear normal.   Eyes:      Conjunctiva/sclera: Conjunctivae normal.      Pupils: Pupils are equal, round, and reactive to light.   Cardiovascular:      Rate and Rhythm: Normal rate and regular rhythm.      Heart sounds: No murmur heard.     No friction rub. No gallop.   Pulmonary:      Effort: Pulmonary effort is normal.      Breath sounds: Normal breath sounds. No wheezing or rhonchi.   Skin:     General: Skin is warm and dry.   Neurological:      Mental Status: He is alert and oriented to person, place, and time.   Psychiatric:         Mood and Affect: Affect normal.         Behavior: Behavior normal.         Thought Content: Thought content normal.            Result Review :  The following data was reviewed by: Vielka Mauricio MD on 12/15/2023:      Procedures        Assessment and Plan   Diagnoses and all orders for this visit:    1. Primary hypertension (Primary)  Comments:  Slightly elevated he will continue to monitor  Orders:  -     Comprehensive Metabolic Panel  -     CBC & Differential    2. Mixed hyperlipidemia  Comments:  Doing well on current meds continue to monitor will check labs and adjust as needed  Orders:  -     Lipid Panel    3. Hypothyroidism due to Hashimoto's thyroiditis  Comments:  Check labs and adjust meds as needed  Orders:  -     TSH  -     T4, Free    4. Hypogonadism in male  Comments:  Doing well on current medicine  Orders:  -     Testosterone, Free, Total    5. Chronic gout of right ankle, unspecified cause  Comments:  Discussed he will likely need allopurinol however gave colchicine discussed that he could try symptomatic treatment if you wish    Other orders  -     colchicine 0.6 MG tablet; Take 1 tablet by mouth daily for 3  days in a row for gout flare  Dispense: 90 tablet; Refill: 1  -     azelaic acid (AZELEX) 15 % gel; Apply 1 application  topically to the appropriate area as directed 2 (Two) Times a Day.  Dispense: 50 g; Refill: 1                  FOLLOW UP  No follow-ups on file.  Patient was given instructions and counseling regarding his condition or for health maintenance advice. Please see specific information pulled into the AVS if appropriate.       Vielka Mauricio MD  12/22/23  16:23 EST    CURRENT & DISCONTINUED MEDICATIONS  Current Outpatient Medications   Medication Instructions    azelaic acid (AZELEX) 15 % gel 1 application , Topical, 2 Times Daily    colchicine 0.6 MG tablet Take 1 tablet by mouth daily for 3 days in a row for gout flare    escitalopram (LEXAPRO) 10 mg, Oral, Daily    levothyroxine (SYNTHROID, LEVOTHROID) 150 mcg, Oral, Daily    olmesartan (BENICAR) 40 mg, Oral, Daily    pantoprazole (PROTONIX) 40 MG EC tablet take 1 tablet by mouth daily    Testosterone Cypionate (DEPOTESTOTERONE CYPIONATE) 200 mg, Intramuscular, Every 7 Days       Medications Discontinued During This Encounter   Medication Reason    brompheniramine-pseudoephedrine-DM 30-2-10 MG/5ML syrup *Therapy completed    econazole nitrate (SPECTAZOLE) 1 % cream *Therapy completed    rosuvastatin (Crestor) 5 MG tablet *Therapy completed    allopurinol (ZYLOPRIM) 100 MG tablet

## 2023-12-18 DIAGNOSIS — I10 PRIMARY HYPERTENSION: Primary | ICD-10-CM

## 2023-12-18 DIAGNOSIS — N17.9 AKI (ACUTE KIDNEY INJURY): ICD-10-CM

## 2023-12-21 LAB
TESTOST FREE SERPL-MCNC: 32 PG/ML (ref 7.2–24)
TESTOST SERPL-MCNC: 1247 NG/DL (ref 264–916)

## 2024-01-10 ENCOUNTER — PATIENT MESSAGE (OUTPATIENT)
Dept: INTERNAL MEDICINE | Facility: CLINIC | Age: 59
End: 2024-01-10
Payer: COMMERCIAL

## 2024-01-10 DIAGNOSIS — E29.1 HYPOGONADISM IN MALE: Primary | ICD-10-CM

## 2024-01-10 NOTE — TELEPHONE ENCOUNTER
From: Jose Louie  To: Vielka Mauricio  Sent: 1/10/2024 11:31 AM EST  Subject: Labs    Can I get my labs checked for creatinine levels since I’ve stopped taking it , also anything else you want checked thyroid cholesterol testosterone? Let me know if order for labs in and I can go by hospital to get drawn   Also do they need to be fasting labs?

## 2024-01-11 ENCOUNTER — LAB (OUTPATIENT)
Dept: LAB | Facility: HOSPITAL | Age: 59
End: 2024-01-11
Payer: COMMERCIAL

## 2024-01-11 DIAGNOSIS — E29.1 HYPOGONADISM IN MALE: ICD-10-CM

## 2024-01-11 LAB
ANION GAP SERPL CALCULATED.3IONS-SCNC: 9 MMOL/L (ref 5–15)
BUN SERPL-MCNC: 16 MG/DL (ref 6–20)
BUN/CREAT SERPL: 12.9 (ref 7–25)
CALCIUM SPEC-SCNC: 8.5 MG/DL (ref 8.6–10.5)
CHLORIDE SERPL-SCNC: 106 MMOL/L (ref 98–107)
CO2 SERPL-SCNC: 23 MMOL/L (ref 22–29)
CREAT SERPL-MCNC: 1.24 MG/DL (ref 0.76–1.27)
EGFRCR SERPLBLD CKD-EPI 2021: 67.8 ML/MIN/1.73
GLUCOSE SERPL-MCNC: 91 MG/DL (ref 65–99)
POTASSIUM SERPL-SCNC: 4.1 MMOL/L (ref 3.5–5.2)
SODIUM SERPL-SCNC: 138 MMOL/L (ref 136–145)

## 2024-01-11 PROCEDURE — 36415 COLL VENOUS BLD VENIPUNCTURE: CPT

## 2024-01-11 PROCEDURE — 84403 ASSAY OF TOTAL TESTOSTERONE: CPT

## 2024-01-11 PROCEDURE — 84402 ASSAY OF FREE TESTOSTERONE: CPT

## 2024-01-11 PROCEDURE — 80048 BASIC METABOLIC PNL TOTAL CA: CPT | Performed by: INTERNAL MEDICINE

## 2024-01-19 LAB
TESTOST FREE SERPL-MCNC: 14.9 PG/ML (ref 7.2–24)
TESTOST SERPL-MCNC: 554 NG/DL (ref 264–916)

## 2024-02-20 DIAGNOSIS — E29.1 HYPOGONADISM IN MALE: ICD-10-CM

## 2024-02-21 RX ORDER — TESTOSTERONE CYPIONATE 200 MG/ML
200 INJECTION, SOLUTION INTRAMUSCULAR
Qty: 4 ML | Refills: 2 | Status: SHIPPED | OUTPATIENT
Start: 2024-02-21

## 2024-02-21 RX ORDER — ESCITALOPRAM OXALATE 10 MG/1
10 TABLET ORAL DAILY
Qty: 90 TABLET | Refills: 1 | Status: SHIPPED | OUTPATIENT
Start: 2024-02-21

## 2024-02-21 NOTE — TELEPHONE ENCOUNTER
Last follow up visit date: 12/15/2023    Last urine drug screen date: 11/4/2022    Last consent/contract date: 11/8/2022    Does patient utilize [facility] pharmacy (yes or no)?    No

## 2024-03-12 ENCOUNTER — PRIOR AUTHORIZATION (OUTPATIENT)
Dept: INTERNAL MEDICINE | Facility: CLINIC | Age: 59
End: 2024-03-12
Payer: COMMERCIAL

## 2024-03-12 NOTE — TELEPHONE ENCOUNTER
PA approved for testosterone cypionate 200mg/ml.    Your request has been approved  PA Case: [id number], Status: Approved, Coverage Starts on: 3/12/2024 12:00:00 AM, Coverage Ends on: 3/12/2025 12:00:00 AM.

## 2024-03-13 ENCOUNTER — TELEPHONE (OUTPATIENT)
Dept: INTERNAL MEDICINE | Facility: CLINIC | Age: 59
End: 2024-03-13

## 2024-03-13 NOTE — TELEPHONE ENCOUNTER
Caller: Jose Louie    Relationship: Self    Best call back number: [phone]     What is the best time to reach you: ANYTIME, OK TO LEAVE VOICEMAIL    Who are you requesting to speak with (clinical staff, provider,  specific staff member): CLINICAL/PROVIDER    What was the call regarding: PATIENT IS WANTING TO DISCUSS WHETHER HE SHOULD INCREASE HIS LEXAPRO. PATIENT IS ALSO WANTING TO LET PROVIDER KNOW THAT HE IS TAKING HIS GOUT MEDICATION AGAIN.     Is it okay if the provider responds through MyChart: PHONE CALL PLEASE

## 2024-03-15 ENCOUNTER — PATIENT MESSAGE (OUTPATIENT)
Dept: INTERNAL MEDICINE | Facility: CLINIC | Age: 59
End: 2024-03-15

## 2024-03-15 ENCOUNTER — OFFICE VISIT (OUTPATIENT)
Dept: INTERNAL MEDICINE | Facility: CLINIC | Age: 59
End: 2024-03-15
Payer: COMMERCIAL

## 2024-03-15 VITALS
TEMPERATURE: 97.9 F | HEIGHT: 69 IN | OXYGEN SATURATION: 95 % | WEIGHT: 226.6 LBS | DIASTOLIC BLOOD PRESSURE: 70 MMHG | BODY MASS INDEX: 33.56 KG/M2 | SYSTOLIC BLOOD PRESSURE: 132 MMHG | HEART RATE: 84 BPM

## 2024-03-15 DIAGNOSIS — E29.1 HYPOGONADISM IN MALE: ICD-10-CM

## 2024-03-15 DIAGNOSIS — M25.561 CHRONIC PAIN OF RIGHT KNEE: Primary | ICD-10-CM

## 2024-03-15 DIAGNOSIS — R41.840 CONCENTRATION DEFICIT: ICD-10-CM

## 2024-03-15 DIAGNOSIS — E06.3 HYPOTHYROIDISM DUE TO HASHIMOTO'S THYROIDITIS: ICD-10-CM

## 2024-03-15 DIAGNOSIS — I10 PRIMARY HYPERTENSION: ICD-10-CM

## 2024-03-15 DIAGNOSIS — Z79.899 ENCOUNTER FOR LONG-TERM (CURRENT) DRUG USE: ICD-10-CM

## 2024-03-15 DIAGNOSIS — F42.8 COMPULSIVE BUYING: ICD-10-CM

## 2024-03-15 DIAGNOSIS — Z79.02 ENCOUNTER FOR CURRENT LONG TERM USE OF ANTIPLATELET DRUG: ICD-10-CM

## 2024-03-15 DIAGNOSIS — G89.29 CHRONIC PAIN OF RIGHT KNEE: Primary | ICD-10-CM

## 2024-03-15 DIAGNOSIS — E78.2 MIXED HYPERLIPIDEMIA: ICD-10-CM

## 2024-03-15 DIAGNOSIS — E03.8 HYPOTHYROIDISM DUE TO HASHIMOTO'S THYROIDITIS: ICD-10-CM

## 2024-03-15 DIAGNOSIS — R41.0 DISORIENTATION: ICD-10-CM

## 2024-03-15 DIAGNOSIS — M1A.0710 CHRONIC GOUT OF RIGHT ANKLE, UNSPECIFIED CAUSE: ICD-10-CM

## 2024-03-15 DIAGNOSIS — S06.0XAD CONCUSSION WITH UNKNOWN LOSS OF CONSCIOUSNESS STATUS, SUBSEQUENT ENCOUNTER: ICD-10-CM

## 2024-03-15 DIAGNOSIS — F43.9 STRESS: ICD-10-CM

## 2024-03-15 PROBLEM — H25.10 NUCLEAR SENILE CATARACT: Status: ACTIVE | Noted: 2023-12-14

## 2024-03-15 LAB
ALBUMIN SERPL-MCNC: 4.3 G/DL (ref 3.5–5.2)
ALBUMIN/GLOB SERPL: 1.7 G/DL
ALP SERPL-CCNC: 74 U/L (ref 39–117)
ALT SERPL W P-5'-P-CCNC: 40 U/L (ref 1–41)
AMPHET+METHAMPHET UR QL: NEGATIVE
AMPHETAMINE INTERNAL CONTROL: NORMAL
AMPHETAMINES UR QL: NEGATIVE
ANION GAP SERPL CALCULATED.3IONS-SCNC: 9.8 MMOL/L (ref 5–15)
AST SERPL-CCNC: 30 U/L (ref 1–40)
BARBITURATE INTERNAL CONTROL: NORMAL
BARBITURATES UR QL SCN: NEGATIVE
BASOPHILS # BLD AUTO: 0.02 10*3/MM3 (ref 0–0.2)
BASOPHILS NFR BLD AUTO: 0.3 % (ref 0–1.5)
BENZODIAZ UR QL SCN: NEGATIVE
BENZODIAZEPINE INTERNAL CONTROL: NORMAL
BILIRUB SERPL-MCNC: 0.4 MG/DL (ref 0–1.2)
BUN SERPL-MCNC: 19 MG/DL (ref 6–20)
BUN/CREAT SERPL: 14.4 (ref 7–25)
BUPRENORPHINE INTERNAL CONTROL: NORMAL
BUPRENORPHINE SERPL-MCNC: NEGATIVE NG/ML
CALCIUM SPEC-SCNC: 9.1 MG/DL (ref 8.6–10.5)
CANNABINOIDS SERPL QL: NEGATIVE
CHLORIDE SERPL-SCNC: 105 MMOL/L (ref 98–107)
CHOLEST SERPL-MCNC: 227 MG/DL (ref 0–200)
CO2 SERPL-SCNC: 26.2 MMOL/L (ref 22–29)
COCAINE INTERNAL CONTROL: NORMAL
COCAINE UR QL: NEGATIVE
CREAT SERPL-MCNC: 1.32 MG/DL (ref 0.76–1.27)
DEPRECATED RDW RBC AUTO: 40.6 FL (ref 37–54)
EGFRCR SERPLBLD CKD-EPI 2021: 62.9 ML/MIN/1.73
EOSINOPHIL # BLD AUTO: 0.13 10*3/MM3 (ref 0–0.4)
EOSINOPHIL NFR BLD AUTO: 1.9 % (ref 0.3–6.2)
ERYTHROCYTE [DISTWIDTH] IN BLOOD BY AUTOMATED COUNT: 12.4 % (ref 12.3–15.4)
EXPIRATION DATE: NORMAL
GLOBULIN UR ELPH-MCNC: 2.5 GM/DL
GLUCOSE SERPL-MCNC: 70 MG/DL (ref 65–99)
HCT VFR BLD AUTO: 49.6 % (ref 37.5–51)
HDLC SERPL-MCNC: 37 MG/DL (ref 40–60)
HGB BLD-MCNC: 16.7 G/DL (ref 13–17.7)
IMM GRANULOCYTES # BLD AUTO: 0.04 10*3/MM3 (ref 0–0.05)
IMM GRANULOCYTES NFR BLD AUTO: 0.6 % (ref 0–0.5)
LDLC SERPL CALC-MCNC: 137 MG/DL (ref 0–100)
LDLC/HDLC SERPL: 3.56 {RATIO}
LYMPHOCYTES # BLD AUTO: 1.26 10*3/MM3 (ref 0.7–3.1)
LYMPHOCYTES NFR BLD AUTO: 18.5 % (ref 19.6–45.3)
Lab: NORMAL
MCH RBC QN AUTO: 30.7 PG (ref 26.6–33)
MCHC RBC AUTO-ENTMCNC: 33.7 G/DL (ref 31.5–35.7)
MCV RBC AUTO: 91.2 FL (ref 79–97)
MDMA (ECSTASY) INTERNAL CONTROL: NORMAL
MDMA UR QL SCN: NEGATIVE
METHADONE INTERNAL CONTROL: NORMAL
METHADONE UR QL SCN: NEGATIVE
METHAMPHETAMINE INTERNAL CONTROL: NORMAL
MONOCYTES # BLD AUTO: 0.72 10*3/MM3 (ref 0.1–0.9)
MONOCYTES NFR BLD AUTO: 10.6 % (ref 5–12)
NEUTROPHILS NFR BLD AUTO: 4.64 10*3/MM3 (ref 1.7–7)
NEUTROPHILS NFR BLD AUTO: 68.1 % (ref 42.7–76)
NRBC BLD AUTO-RTO: 0 /100 WBC (ref 0–0.2)
OPIATES INTERNAL CONTROL: NORMAL
OPIATES UR QL: NEGATIVE
OXYCODONE INTERNAL CONTROL: NORMAL
OXYCODONE UR QL SCN: NEGATIVE
PCP UR QL SCN: NEGATIVE
PHENCYCLIDINE INTERNAL CONTROL: NORMAL
PLATELET # BLD AUTO: 215 10*3/MM3 (ref 140–450)
PMV BLD AUTO: 12.4 FL (ref 6–12)
POTASSIUM SERPL-SCNC: 4.6 MMOL/L (ref 3.5–5.2)
PROT SERPL-MCNC: 6.8 G/DL (ref 6–8.5)
RBC # BLD AUTO: 5.44 10*6/MM3 (ref 4.14–5.8)
SODIUM SERPL-SCNC: 141 MMOL/L (ref 136–145)
THC INTERNAL CONTROL: NORMAL
TRIGL SERPL-MCNC: 292 MG/DL (ref 0–150)
TSH SERPL DL<=0.05 MIU/L-ACNC: 2.62 UIU/ML (ref 0.27–4.2)
URATE SERPL-MCNC: 6.5 MG/DL (ref 3.4–7)
VLDLC SERPL-MCNC: 53 MG/DL (ref 5–40)
WBC NRBC COR # BLD AUTO: 6.81 10*3/MM3 (ref 3.4–10.8)

## 2024-03-15 PROCEDURE — 84402 ASSAY OF FREE TESTOSTERONE: CPT | Performed by: INTERNAL MEDICINE

## 2024-03-15 PROCEDURE — 80061 LIPID PANEL: CPT | Performed by: INTERNAL MEDICINE

## 2024-03-15 PROCEDURE — 84403 ASSAY OF TOTAL TESTOSTERONE: CPT | Performed by: INTERNAL MEDICINE

## 2024-03-15 PROCEDURE — 80050 GENERAL HEALTH PANEL: CPT | Performed by: INTERNAL MEDICINE

## 2024-03-15 PROCEDURE — 84550 ASSAY OF BLOOD/URIC ACID: CPT | Performed by: INTERNAL MEDICINE

## 2024-03-15 RX ORDER — TESTOSTERONE CYPIONATE 200 MG/ML
200 INJECTION, SOLUTION INTRAMUSCULAR
Qty: 4 ML | Refills: 2 | Status: SHIPPED | OUTPATIENT
Start: 2024-03-15

## 2024-03-15 RX ORDER — PREDNISOLONE ACETATE 10 MG/ML
1 SUSPENSION/ DROPS OPHTHALMIC 4 TIMES DAILY
COMMUNITY
Start: 2024-02-23

## 2024-03-15 RX ORDER — OLMESARTAN MEDOXOMIL 40 MG/1
40 TABLET ORAL DAILY
Qty: 90 TABLET | Refills: 1 | Status: SHIPPED | OUTPATIENT
Start: 2024-03-15

## 2024-03-15 RX ORDER — DESVENLAFAXINE 25 MG/1
25 TABLET, EXTENDED RELEASE ORAL DAILY
Qty: 90 TABLET | Refills: 1 | Status: SHIPPED | OUTPATIENT
Start: 2024-03-15

## 2024-03-15 NOTE — TELEPHONE ENCOUNTER
From: Jose Louie  To: Vielka Mauricio  Sent: 3/15/2024 3:04 PM EDT  Subject: New antidepressant     My eye dr cyndi das is fine w my issue
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
## 2024-03-18 ENCOUNTER — TELEPHONE (OUTPATIENT)
Dept: INTERNAL MEDICINE | Facility: CLINIC | Age: 59
End: 2024-03-18

## 2024-03-18 NOTE — TELEPHONE ENCOUNTER
Faxed order to [facility] per patient request. Patient will keep current appointment if there is nothing sooner at [facility].

## 2024-03-18 NOTE — TELEPHONE ENCOUNTER
Caller: JENNY JACOBS    Relationship: Emergency Contact    Best call back number: [phone]       What specialty or service is being requested: MRI FOR KNEE AND BRAIN    What is the provider, practice or medical service name: [facility]    What is the office location: ANY PLACE      Any additional details: PATIENTS WIFE CALLED IN AND SAID THAT THE PATIENT WAS SCHEDULED FOR HIS MRI'S FOR 4.17.24 AND THEY WOULD LIKE TO KNOW IF THERE ARE ANY OTHER TRUSTED IMAGING PLACES THAT COULD AT LEAST DO HIS KNEE MRI SOONER.    PATIENTS WIFE IS ASKING FOR A CALL BACK TO [facility].

## 2024-03-19 LAB
TESTOST FREE SERPL-MCNC: 22.5 PG/ML (ref 7.2–24)
TESTOST SERPL-MCNC: 713 NG/DL (ref 264–916)

## 2024-03-28 ENCOUNTER — TELEPHONE (OUTPATIENT)
Dept: INTERNAL MEDICINE | Facility: CLINIC | Age: 59
End: 2024-03-28

## 2024-03-28 NOTE — TELEPHONE ENCOUNTER
Caller: JENNY JACOBS    Relationship: Emergency Contact    Best call back number: [phone]     What is the medical concern/diagnosis: KNEE PAIN    What specialty or service is being requested: ORTHOPEDIC    What is the provider, practice or medical service name: DR STEWART    What is the office location: [place]    What is the office phone number: [phone]

## 2024-03-29 ENCOUNTER — TELEPHONE (OUTPATIENT)
Dept: INTERNAL MEDICINE | Facility: CLINIC | Age: 59
End: 2024-03-29
Payer: COMMERCIAL

## 2024-03-29 NOTE — TELEPHONE ENCOUNTER
Patient called into office stating that he received MRI Knee results (results scanned into chart). Stated that he would like a referral for Ortho to Dr. Noyola    Please place referral

## 2024-04-15 ENCOUNTER — PREP FOR SURGERY (OUTPATIENT)
Dept: OTHER | Facility: HOSPITAL | Age: 59
End: 2024-04-15
Payer: COMMERCIAL

## 2024-04-15 ENCOUNTER — OFFICE VISIT (OUTPATIENT)
Dept: ORTHOPEDIC SURGERY | Facility: CLINIC | Age: 59
End: 2024-04-15
Payer: COMMERCIAL

## 2024-04-15 VITALS
HEIGHT: 69 IN | DIASTOLIC BLOOD PRESSURE: 70 MMHG | HEART RATE: 72 BPM | SYSTOLIC BLOOD PRESSURE: 147 MMHG | BODY MASS INDEX: 32.58 KG/M2 | OXYGEN SATURATION: 96 % | WEIGHT: 220 LBS

## 2024-04-15 DIAGNOSIS — S83.241A ACUTE MEDIAL MENISCUS TEAR OF RIGHT KNEE, INITIAL ENCOUNTER: ICD-10-CM

## 2024-04-15 DIAGNOSIS — S83.241A ACUTE MEDIAL MENISCUS TEAR OF RIGHT KNEE, INITIAL ENCOUNTER: Primary | ICD-10-CM

## 2024-04-15 DIAGNOSIS — M25.561 RIGHT KNEE PAIN, UNSPECIFIED CHRONICITY: Primary | ICD-10-CM

## 2024-04-15 PROBLEM — M23.321 MEDIAL MENISCUS, POSTERIOR HORN DERANGEMENT, RIGHT: Status: ACTIVE | Noted: 2024-04-15

## 2024-04-15 PROCEDURE — 99203 OFFICE O/P NEW LOW 30 MIN: CPT | Performed by: ORTHOPAEDIC SURGERY

## 2024-04-15 RX ORDER — CEFAZOLIN SODIUM 2 G/100ML
2 INJECTION, SOLUTION INTRAVENOUS ONCE
OUTPATIENT
Start: 2024-04-15 | End: 2024-04-15

## 2024-04-15 RX ORDER — CEFAZOLIN SODIUM IN 0.9 % NACL 3 G/100 ML
3 INTRAVENOUS SOLUTION, PIGGYBACK (ML) INTRAVENOUS ONCE
OUTPATIENT
Start: 2024-04-15 | End: 2024-04-15

## 2024-04-15 NOTE — PROGRESS NOTES
"Chief Complaint  Initial Evaluation of the Right Knee     Subjective      Jose Louie presents to [facility] for initial evaluation of the right knee. He has pain for about 3 months on the medial aspect of the knee.  He exercises, but has had no injury or fall.  He has pain with twisting and turning of the knee.  He has had milt swelling.  He notes weakness in the right knee.  He has difficulty with stooping, squatting and stairs.      No Known Allergies     Social History     Socioeconomic History    Marital status:    Tobacco Use    Smoking status: Never    Smokeless tobacco: Never    Tobacco comments:     History of dip tobacco   Vaping Use    Vaping status: Never Used   Substance and Sexual Activity    Alcohol use: Yes     Comment: socially    Drug use: Never    Sexual activity: Yes     Partners: Female     Birth control/protection: Vasectomy, Same-sex partner        I reviewed the patient's chief complaint, history of present illness, review of systems, past medical history, surgical history, family history, social history, medications, and allergy list.     Review of Systems     Constitutional: Denies fevers, chills, weight loss  Cardiovascular: Denies chest pain, shortness of breath  Skin: Denies rashes, acute skin changes  Neurologic: Denies headache, loss of consciousness        Vital Signs:   /70   Pulse 72   Ht 175.3 cm (69\")   Wt 99.8 kg (220 lb)   SpO2 96%   BMI 32.49 kg/m²          Physical Exam  General: Alert. No acute distress    Ortho Exam        RIGHT KNEE Flexion 125. Extension 0. Stable to varus/valgus stress. Stable to anterior/posterior drawer. Neurovascularly intact. Positive Alexsandra. Negative Lachman. Positive EHL, FHL, HS and TA. Sensation intact to light touch all 5 nerves of the foot. Ambulates with Antalgic gait. Patella is well tracking. Calf supple, non-tender. Positive tenderness to the medial joint line. Negative tenderness " to the lateral joint line. Negative Crepitus. Good strength to hamstrings, quadriceps, dorsiflexors, and plantar flexors.  Knee Extensor Mechanism intact       Procedures        Imaging Results (Most Recent)       None             Result Review :     Narrative & Impression   PROCEDURE:XR KNEE 3 VW RIGHT     COMPARISON:None     INDICATIONS:right knee pain     FINDINGS:          There is marginal osteophyte formation along the intercondylar notch and along the medial joint   line.  There is mild chondrocalcinosis.  There is mild patellofemoral joint disease.  There is no   joint effusion.     IMPRESSION:                 1. Early radiographic changes of osteoarthritis.          MRI 3/27/24  PROCEDURE: MRI of the right knee without contrast  TECHNIQUE: Multiplanar multisequence imaging through the region of interest  COMPARISON: None  FINDINGS: There is a tear involving the posterior horn of the medial meniscus towards the tibial surface on series 5, image 22.  Degenerative type signal noted in the anterior horn of the lateral meniscus.  Posterior cruciate ligament remains intact. The anterior cruciate ligament remains intact.  The quadriceps tendon remains intact. There is a 2-3 mm partial-thickness tearing in the proximal patellar tendon. There is  moderate degree of edema in the prepatellar soft tissues.  Medial collateral ligament remains intact. Lateral collateral ligament remains intact. There is tendinosis of the popliteus.  Biceps femoris and iliotibial band remains intact.  There is trace tenosynovitis. Sartorius, gracilis and semitendinosis remains intact.  Tibiofibular ligament, lateral patellofemoral ligament and medial patellofemoral ligament remains intact.  There is a fissure in the patellar cartilage extending to the cortex in the central aspect of the patella on series 5, image 12. Several  fissures noted in the lateral patellar facet cartilage on series 5, image 7 extending to the cortex. There is mild  thinning, fraying and  fissuring of the cartilage. There is moderate thinning, fraying and fissuring of the cartilage over the trochlea with a 4 mm fullthickness defect on series 2, image 12 extending to the cortex. Underlying subchondral cystic changes and reactive marrow edema  noted. There is minimal thinning of the cartilage over the posterior aspect of the medial femoral condyle. The medial  compartmental cartilage demonstrates mild fraying and fissuring. The lateral compartmental cartilage demonstrates mild thinning,  fraying and fissuring. Tricompartmental mild degree of marginal osteophytosis noted.  IMPRESSION:  Tear involving the posterior horn of the medial meniscus  Degenerative type signal versus contusion of the anterior horn of the lateral meniscus  Cruciates remain intact  Partial-thickness tearing of the proximal patellar tendon  Collaterals remain intact  Patellofemoral compartmental cartilage disease            Assessment and Plan     Diagnoses and all orders for this visit:    1. Right knee pain, unspecified chronicity (Primary)    2. Acute medial meniscus tear of right knee, initial encounter        Discussed the treatment plan with the patient. I reviewed the X-rays that were obtained today with the patient.     Discussed the treatment options with the patient, operative vs non-operative. The patient is a candidate for a right knee arthroscopy whenever he is ready.     The patient expressed understanding and wished to proceed with a Right knee arthroscopy, partial medial meniscectomy, possible chondroplasty     Discussed surgery., Risks/benefits discussed with patient including, but not limited to: infection, bleeding, neurovascular damage, re-rupture, aesthetic deformity, need for further surgery, and death., Surgery pamphlet given., and Call or return if worsening symptoms.    Follow Up     2 weeks postoperatively       Patient was given instructions and counseling regarding his condition or  for health maintenance advice. Please see specific information pulled into the AVS if appropriate.     Scribed for Raúl Noyola MD by Maricarmen Zaman MA.  04/15/24   13:23 EDT    I have personally performed the services described in this document as scribed by the above individual and it is both accurate and complete. Raúl Noyola MD 04/15/24

## 2024-04-17 ENCOUNTER — HOSPITAL ENCOUNTER (OUTPATIENT)
Dept: MRI IMAGING | Facility: HOSPITAL | Age: 59
Discharge: HOME | End: 2024-04-17
Admitting: INTERNAL MEDICINE
Payer: COMMERCIAL

## 2024-04-17 ENCOUNTER — APPOINTMENT (OUTPATIENT)
Dept: MRI IMAGING | Facility: HOSPITAL | Age: 59
End: 2024-04-17
Payer: COMMERCIAL

## 2024-04-17 DIAGNOSIS — R41.840 CONCENTRATION DEFICIT: ICD-10-CM

## 2024-04-17 DIAGNOSIS — R41.0 DISORIENTATION: ICD-10-CM

## 2024-04-17 DIAGNOSIS — S06.0XAD CONCUSSION WITH UNKNOWN LOSS OF CONSCIOUSNESS STATUS, SUBSEQUENT ENCOUNTER: ICD-10-CM

## 2024-04-17 LAB
CREAT BLDA-MCNC: 1.4 MG/DL (ref 0.6–1.3)
EGFRCR SERPLBLD CKD-EPI 2021: 58.6 ML/MIN/1.73

## 2024-04-17 PROCEDURE — 0 GADOBENATE DIMEGLUMINE 529 MG/ML SOLUTION: Performed by: INTERNAL MEDICINE

## 2024-04-17 PROCEDURE — 82565 ASSAY OF CREATININE: CPT

## 2024-04-17 PROCEDURE — 70553 MRI BRAIN STEM W/O & W/DYE: CPT

## 2024-04-17 PROCEDURE — A9577 INJ MULTIHANCE: HCPCS | Performed by: INTERNAL MEDICINE

## 2024-04-17 RX ADMIN — GADOBENATE DIMEGLUMINE 20 ML: 529 INJECTION, SOLUTION INTRAVENOUS at 18:21

## 2024-04-22 ENCOUNTER — OFFICE VISIT (OUTPATIENT)
Dept: INTERNAL MEDICINE | Facility: CLINIC | Age: 59
End: 2024-04-22
Payer: COMMERCIAL

## 2024-04-22 VITALS
WEIGHT: 229.4 LBS | SYSTOLIC BLOOD PRESSURE: 134 MMHG | TEMPERATURE: 98.6 F | HEIGHT: 69 IN | RESPIRATION RATE: 18 BRPM | OXYGEN SATURATION: 98 % | BODY MASS INDEX: 33.98 KG/M2 | HEART RATE: 73 BPM | DIASTOLIC BLOOD PRESSURE: 68 MMHG

## 2024-04-22 DIAGNOSIS — I10 PRIMARY HYPERTENSION: ICD-10-CM

## 2024-04-22 DIAGNOSIS — E29.1 HYPOGONADISM IN MALE: ICD-10-CM

## 2024-04-22 DIAGNOSIS — F41.9 ANXIETY: Primary | ICD-10-CM

## 2024-04-22 PROCEDURE — 99214 OFFICE O/P EST MOD 30 MIN: CPT | Performed by: INTERNAL MEDICINE

## 2024-04-22 RX ORDER — DESVENLAFAXINE SUCCINATE 50 MG/1
50 TABLET, EXTENDED RELEASE ORAL DAILY
Start: 2024-04-22
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
## 2024-04-29 NOTE — PRE-PROCEDURE INSTRUCTIONS
IMPORTANT INSTRUCTIONS - PRE-ADMISSION TESTING  DO NOT EAT OR CHEW anything after midnight the night before your procedure.    You may have CLEAR liquids up to ______ hours prior to ARRIVAL time.   Take the following medications the morning of your procedure with JUST A SIP OF WATER:  _______________________________________________________________________________________________________________________________________________________________________________________    DO NOT BRING your medications to the hospital with you, UNLESS something has changed since your PRE-Admission Testing appointment.  Hold all vitamins, supplements, and NSAIDS (Non- steroidal anti-inflammatory meds) for one week prior to surgery (you MAY take Tylenol or Acetaminophen).  If you are diabetic, check your blood sugar the morning of your procedure. If it is less than 70 or if you are feeling symptomatic, call the following number for further instructions: [phone]  Use your inhalers/nebulizers as usual, the morning of your procedure. BRING YOUR INHALERS with you.   Bring your CPAP or BIPAP to hospital, ONLY IF YOU WILL BE SPENDING THE NIGHT.   Make sure you have a ride home and have someone who will stay with you the day of your procedure after you go home.  If you have any questions, please call your Pre-Admission Testing Nurse, ________________ at [phone]_.   Per anesthesia request, do not smoke for 24 hours before your procedure or as instructed by your surgeon.    Clear Liquid Diet        Find out when you need to start a clear liquid diet.   Think of “clear liquids” as anything you could read a newspaper through. This includes things like water, broth, sports drinks, or tea WITHOUT any kind of milk or cream.           Once you are told to start a clear liquid diet, only drink these things until 2 hours before arrival to the hospital or when the hospital says to stop. Total volume limitation: 8 oz.       Clear  liquids you CAN drink:   Water   Clear broth: beef, chicken, vegetable, or bone broth with nothing in it   Gatorade   Lemonade or Devin-aid   Soda   Tea, coffee (NO cream or honey)   Jell-O (without fruit)   Popsicles (without fruit or cream)   Italian ices   Juice without pulp: apple, white, grape   You may use salt, pepper, and sugar    Do NOT drink:   Milk or cream   Soy milk, almond milk, coconut milk, or other non-dairy drinks and   creamers   Milkshakes or smoothies   Tomato juice   Orange juice   Grapefruit juice   Cream soups or any other than broth         Clear Liquid Diet:  Do NOT eat any solid food.  Do NOT eat or suck on mints or candy.  Do NOT chew gum.  Do NOT drink thick liquids like milk or juice with pulp in it.  Do NOT add milk, cream, or anything like soy milk or almond milk to coffee or tea.       Clear Liquid Diet        Find out when you need to start a clear liquid diet.   Think of “clear liquids” as anything you could read a newspaper through. This includes things like water, broth, sports drinks, or tea WITHOUT any kind of milk or cream.           Once you are told to start a clear liquid diet, only drink these things until 2 hours before arrival to the hospital or when the hospital says to stop. Total volume limitation: 8 oz.       Clear liquids you CAN drink:   Water   Clear broth: beef, chicken, vegetable, or bone broth with nothing in it   Gatorade   Lemonade or Devin-aid   Soda   Tea, coffee (NO cream or honey)   Jell-O (without fruit)   Popsicles (without fruit or cream)   Italian ices   Juice without pulp: apple, white, grape   You may use salt, pepper, and sugar    Do NOT drink:   Milk or cream   Soy milk, almond milk, coconut milk, or other non-dairy drinks and   creamers   Milkshakes or smoothies   Tomato juice   Orange juice   Grapefruit juice   Cream soups or any other than broth         Clear Liquid Diet:  Do NOT eat any solid food.  Do NOT eat or suck on mints or candy.  Do NOT  chew gum.  Do NOT drink thick liquids like milk or juice with pulp in it.  Do NOT add milk, cream, or anything like soy milk or almond milk to coffee or tea.   Clear Liquid Diet        Find out when you need to start a clear liquid diet.   Think of “clear liquids” as anything you could read a newspaper through. This includes things like water, broth, sports drinks, or tea WITHOUT any kind of milk or cream.           Once you are told to start a clear liquid diet, only drink these things until 2 hours before arrival to the hospital or when the hospital says to stop. Total volume limitation: 8 oz.       Clear liquids you CAN drink:   Water   Clear broth: beef, chicken, vegetable, or bone broth with nothing in it   Gatorade   Lemonade or Devin-aid   Soda   Tea, coffee (NO cream or honey)   Jell-O (without fruit)   Popsicles (without fruit or cream)   Italian ices   Juice without pulp: apple, white, grape   You may use salt, pepper, and sugar    Do NOT drink:   Milk or cream   Soy milk, almond milk, coconut milk, or other non-dairy drinks and   creamers   Milkshakes or smoothies   Tomato juice   Orange juice   Grapefruit juice   Cream soups or any other than broth         Clear Liquid Diet:  Do NOT eat any solid food.  Do NOT eat or suck on mints or candy.  Do NOT chew gum.  Do NOT drink thick liquids like milk or juice with pulp in it.  Do NOT add milk, cream, or anything like soy milk or almond milk to coffee or tea.   PREOPERATIVE (BEFORE SURGERY)              BATHING INSTRUCTIONS  Instructions:    You will need to shower 1  times utilizing the soap provided; at the times indicated   below:     05/2/24     Wash your hair and face with normal shampoo and soap, rinse it well before using the surgical soap.      In the shower, wet the skin completely with water from your neck to your feet. Apply the cleanser to your   body ONLY FROM THE NECK TO YOUR FEET.     Do NOT USE THE CLEANSER ON YOUR FACE, HEAD, OR GENITAL  (PRIVATE) AREAS.   Keep it out of your eyes, ears, and mouth because of the risk of injury to those areas.      Scrub with a clean washcloth for each bath utilizing the soap provided from the top of your body to the   bottom starting at the neck area.      Pay close attention to your armpits, groin area, and the site of surgery.      Wash your body gently for 5 minutes. Stand outside the stream or turn off the water while scrubbing your   body. Do NOT wash with your regular soap after the surgical cleanser is used.      RINSE THE CLEANSER OFF COMPLETELY with plenty of water. Rinse the area again thoroughly.      Dry off with a clean towel. The surgical soap can cause dryness; however do NOT APPLY LOTION,   CREAM, POWDER, and/or DEODORANT AFTER SHOWERING.     Be sure to where clean clothes after showering.      Ensure CLEAN BED LINENS AFTER FIRST wash with the surgical soap.      NO PETS ALLOWED IN THE BED with you after utilizing the surgical soap.

## 2024-05-01 ENCOUNTER — ANESTHESIA EVENT (OUTPATIENT)
Dept: PERIOP | Facility: HOSPITAL | Age: 59
End: 2024-05-01
Payer: COMMERCIAL

## 2024-05-02 ENCOUNTER — ANESTHESIA (OUTPATIENT)
Dept: PERIOP | Facility: HOSPITAL | Age: 59
End: 2024-05-02
Payer: COMMERCIAL

## 2024-05-02 ENCOUNTER — HOSPITAL ENCOUNTER (OUTPATIENT)
Facility: HOSPITAL | Age: 59
Discharge: HOME | End: 2024-05-02
Attending: ORTHOPAEDIC SURGERY | Admitting: ORTHOPAEDIC SURGERY
Payer: COMMERCIAL

## 2024-05-02 VITALS
SYSTOLIC BLOOD PRESSURE: 107 MMHG | HEART RATE: 73 BPM | HEIGHT: 69 IN | WEIGHT: 225.09 LBS | OXYGEN SATURATION: 96 % | DIASTOLIC BLOOD PRESSURE: 76 MMHG | RESPIRATION RATE: 16 BRPM | TEMPERATURE: 97 F | BODY MASS INDEX: 33.34 KG/M2

## 2024-05-02 DIAGNOSIS — S83.241A ACUTE MEDIAL MENISCUS TEAR OF RIGHT KNEE, INITIAL ENCOUNTER: ICD-10-CM

## 2024-05-02 DIAGNOSIS — S83.241D ACUTE MEDIAL MENISCUS TEAR OF RIGHT KNEE, SUBSEQUENT ENCOUNTER: Primary | ICD-10-CM

## 2024-05-02 LAB
QT INTERVAL: 370 MS
QTC INTERVAL: 406 MS

## 2024-05-02 PROCEDURE — 25010000002 MIDAZOLAM PER 1MG: Performed by: ANESTHESIOLOGY

## 2024-05-02 PROCEDURE — 25010000002 DEXAMETHASONE PER 1 MG: Performed by: NURSE ANESTHETIST, CERTIFIED REGISTERED

## 2024-05-02 PROCEDURE — 25010000002 FENTANYL CITRATE (PF) 50 MCG/ML SOLUTION: Performed by: NURSE ANESTHETIST, CERTIFIED REGISTERED

## 2024-05-02 PROCEDURE — 25010000002 BUPIVACAINE (PF) 0.5 % SOLUTION: Performed by: ORTHOPAEDIC SURGERY

## 2024-05-02 PROCEDURE — 25010000002 ONDANSETRON PER 1 MG: Performed by: NURSE ANESTHETIST, CERTIFIED REGISTERED

## 2024-05-02 PROCEDURE — 93005 ELECTROCARDIOGRAM TRACING: CPT | Performed by: ANESTHESIOLOGY

## 2024-05-02 PROCEDURE — 29881 ARTHRS KNE SRG MNISECTMY M/L: CPT | Performed by: ORTHOPAEDIC SURGERY

## 2024-05-02 PROCEDURE — 25010000002 CEFAZOLIN PER 500 MG: Performed by: ORTHOPAEDIC SURGERY

## 2024-05-02 PROCEDURE — 25810000003 LACTATED RINGERS PER 1000 ML: Performed by: ANESTHESIOLOGY

## 2024-05-02 PROCEDURE — S0260 H&P FOR SURGERY: HCPCS | Performed by: ORTHOPAEDIC SURGERY

## 2024-05-02 PROCEDURE — 25010000002 PROPOFOL 10 MG/ML EMULSION: Performed by: NURSE ANESTHETIST, CERTIFIED REGISTERED

## 2024-05-02 RX ORDER — CEFAZOLIN SODIUM IN 0.9 % NACL 3 G/100 ML
3 INTRAVENOUS SOLUTION, PIGGYBACK (ML) INTRAVENOUS ONCE
Status: DISCONTINUED | OUTPATIENT
Start: 2024-05-02 | End: 2024-05-02

## 2024-05-02 RX ORDER — MIDAZOLAM HYDROCHLORIDE 2 MG/2ML
2 INJECTION, SOLUTION INTRAMUSCULAR; INTRAVENOUS ONCE
Status: COMPLETED | OUTPATIENT
Start: 2024-05-02 | End: 2024-05-02

## 2024-05-02 RX ORDER — HYDROCODONE BITARTRATE AND ACETAMINOPHEN 7.5; 325 MG/1; MG/1
1 TABLET ORAL EVERY 4 HOURS PRN
Qty: 25 TABLET | Refills: 0 | Status: SHIPPED | OUTPATIENT
Start: 2024-05-02

## 2024-05-02 RX ORDER — FENTANYL CITRATE 50 UG/ML
INJECTION, SOLUTION INTRAMUSCULAR; INTRAVENOUS AS NEEDED
Status: DISCONTINUED | OUTPATIENT
Start: 2024-05-02 | End: 2024-05-02 | Stop reason: SURG

## 2024-05-02 RX ORDER — PROPOFOL 10 MG/ML
VIAL (ML) INTRAVENOUS AS NEEDED
Status: DISCONTINUED | OUTPATIENT
Start: 2024-05-02 | End: 2024-05-02 | Stop reason: SURG

## 2024-05-02 RX ORDER — ONDANSETRON 2 MG/ML
INJECTION INTRAMUSCULAR; INTRAVENOUS AS NEEDED
Status: DISCONTINUED | OUTPATIENT
Start: 2024-05-02 | End: 2024-05-02 | Stop reason: SURG

## 2024-05-02 RX ORDER — LIDOCAINE HYDROCHLORIDE 20 MG/ML
INJECTION, SOLUTION EPIDURAL; INFILTRATION; INTRACAUDAL; PERINEURAL AS NEEDED
Status: DISCONTINUED | OUTPATIENT
Start: 2024-05-02 | End: 2024-05-02 | Stop reason: SURG

## 2024-05-02 RX ORDER — DESVENLAFAXINE SUCCINATE 50 MG/1
50 TABLET, EXTENDED RELEASE ORAL DAILY
Start: 2024-05-02

## 2024-05-02 RX ORDER — MEPERIDINE HYDROCHLORIDE 25 MG/ML
12.5 INJECTION INTRAMUSCULAR; INTRAVENOUS; SUBCUTANEOUS
Status: DISCONTINUED | OUTPATIENT
Start: 2024-05-02 | End: 2024-05-02 | Stop reason: HOSPADM

## 2024-05-02 RX ORDER — SODIUM CHLORIDE, SODIUM LACTATE, POTASSIUM CHLORIDE, CALCIUM CHLORIDE 600; 310; 30; 20 MG/100ML; MG/100ML; MG/100ML; MG/100ML
9 INJECTION, SOLUTION INTRAVENOUS CONTINUOUS PRN
Status: DISCONTINUED | OUTPATIENT
Start: 2024-05-02 | End: 2024-05-02 | Stop reason: HOSPADM

## 2024-05-02 RX ORDER — ACETAMINOPHEN 500 MG
1000 TABLET ORAL ONCE
Status: COMPLETED | OUTPATIENT
Start: 2024-05-02 | End: 2024-05-02

## 2024-05-02 RX ORDER — BUPIVACAINE HYDROCHLORIDE 5 MG/ML
INJECTION, SOLUTION EPIDURAL; INTRACAUDAL AS NEEDED
Status: DISCONTINUED | OUTPATIENT
Start: 2024-05-02 | End: 2024-05-02 | Stop reason: HOSPADM

## 2024-05-02 RX ORDER — PHENYLEPHRINE HCL IN 0.9% NACL 1 MG/10 ML
SYRINGE (ML) INTRAVENOUS AS NEEDED
Status: DISCONTINUED | OUTPATIENT
Start: 2024-05-02 | End: 2024-05-02 | Stop reason: SURG

## 2024-05-02 RX ORDER — DEXMEDETOMIDINE HYDROCHLORIDE 100 UG/ML
INJECTION, SOLUTION INTRAVENOUS AS NEEDED
Status: DISCONTINUED | OUTPATIENT
Start: 2024-05-02 | End: 2024-05-02 | Stop reason: SURG

## 2024-05-02 RX ORDER — DEXAMETHASONE SODIUM PHOSPHATE 4 MG/ML
INJECTION, SOLUTION INTRA-ARTICULAR; INTRALESIONAL; INTRAMUSCULAR; INTRAVENOUS; SOFT TISSUE AS NEEDED
Status: DISCONTINUED | OUTPATIENT
Start: 2024-05-02 | End: 2024-05-02 | Stop reason: SURG

## 2024-05-02 RX ORDER — ONDANSETRON 2 MG/ML
4 INJECTION INTRAMUSCULAR; INTRAVENOUS ONCE AS NEEDED
Status: DISCONTINUED | OUTPATIENT
Start: 2024-05-02 | End: 2024-05-02 | Stop reason: HOSPADM

## 2024-05-02 RX ORDER — PROMETHAZINE HYDROCHLORIDE 25 MG/1
25 SUPPOSITORY RECTAL ONCE AS NEEDED
Status: DISCONTINUED | OUTPATIENT
Start: 2024-05-02 | End: 2024-05-02 | Stop reason: HOSPADM

## 2024-05-02 RX ORDER — PROMETHAZINE HYDROCHLORIDE 12.5 MG/1
25 TABLET ORAL ONCE AS NEEDED
Status: DISCONTINUED | OUTPATIENT
Start: 2024-05-02 | End: 2024-05-02 | Stop reason: HOSPADM

## 2024-05-02 RX ORDER — BUPIVACAINE HCL/0.9 % NACL/PF 0.1 %
2 PLASTIC BAG, INJECTION (ML) EPIDURAL ONCE
Status: COMPLETED | OUTPATIENT
Start: 2024-05-02 | End: 2024-05-02

## 2024-05-02 RX ORDER — OXYCODONE HYDROCHLORIDE 5 MG/1
5 TABLET ORAL
Status: DISCONTINUED | OUTPATIENT
Start: 2024-05-02 | End: 2024-05-02 | Stop reason: HOSPADM

## 2024-05-02 RX ADMIN — LIDOCAINE HYDROCHLORIDE 100 MG: 20 INJECTION, SOLUTION INTRAVENOUS at 08:33

## 2024-05-02 RX ADMIN — DEXMEDETOMIDINE 10 MCG: 100 INJECTION, SOLUTION INTRAVENOUS at 08:31

## 2024-05-02 RX ADMIN — ACETAMINOPHEN 1000 MG: 500 TABLET ORAL at 07:39

## 2024-05-02 RX ADMIN — FENTANYL CITRATE 50 MCG: 50 INJECTION, SOLUTION INTRAMUSCULAR; INTRAVENOUS at 08:33

## 2024-05-02 RX ADMIN — PROPOFOL 200 MG: 10 INJECTION, EMULSION INTRAVENOUS at 08:33

## 2024-05-02 RX ADMIN — Medication 100 MCG: at 08:38

## 2024-05-02 RX ADMIN — DEXAMETHASONE SODIUM PHOSPHATE 4 MG: 4 INJECTION, SOLUTION INTRAMUSCULAR; INTRAVENOUS at 08:38

## 2024-05-02 RX ADMIN — MIDAZOLAM HYDROCHLORIDE 2 MG: 1 INJECTION, SOLUTION INTRAMUSCULAR; INTRAVENOUS at 08:18

## 2024-05-02 RX ADMIN — Medication 100 MCG: at 08:46

## 2024-05-02 RX ADMIN — ONDANSETRON HYDROCHLORIDE 4 MG: 2 SOLUTION INTRAMUSCULAR; INTRAVENOUS at 08:38

## 2024-05-02 RX ADMIN — DEXMEDETOMIDINE 10 MCG: 100 INJECTION, SOLUTION INTRAVENOUS at 08:54

## 2024-05-02 RX ADMIN — FENTANYL CITRATE 50 MCG: 50 INJECTION, SOLUTION INTRAMUSCULAR; INTRAVENOUS at 08:54

## 2024-05-02 RX ADMIN — DEXMEDETOMIDINE 10 MCG: 100 INJECTION, SOLUTION INTRAVENOUS at 08:36

## 2024-05-02 RX ADMIN — SODIUM CHLORIDE, POTASSIUM CHLORIDE, SODIUM LACTATE AND CALCIUM CHLORIDE 9 ML/HR: 600; 310; 30; 20 INJECTION, SOLUTION INTRAVENOUS at 07:39

## 2024-05-02 RX ADMIN — DEXMEDETOMIDINE 10 MCG: 100 INJECTION, SOLUTION INTRAVENOUS at 08:33

## 2024-05-02 RX ADMIN — DEXMEDETOMIDINE 10 MCG: 100 INJECTION, SOLUTION INTRAVENOUS at 08:29

## 2024-05-02 RX ADMIN — Medication 2 G: at 08:27

## 2024-05-02 NOTE — OP NOTE
KNEE ARTHROSCOPY WITH MENISCECTOMY  Procedure Report    Patient Name:  Jose Louie  YOB: 1965    Date of Surgery:  5/2/2024     Indications: See H&P  Pre-op Diagnosis:   Acute medial meniscus tear of right knee, initial encounter [S83.241A]       Post-Op Diagnosis Codes:     * Acute medial meniscus tear of right knee, initial encounter [S83.241A]    Procedure/CPT® Codes:      Procedure(s):  Right KNEE ARTHROSCOPY WITH PARTIAL MEDIAL MENISCECTOMY    Staff:  Surgeon(s):  Raúl Noyola MD    Assistant: Tennille Johnson    Anesthesia: General    Estimated Blood Loss: minimal    Implants:    Nothing was implanted during the procedure    Specimen:          None        Findings: See description    Complications: None    Description of Procedure: Surgical timeout was called. Operative extremity was correctly identified in the operating room suite. Patient was administered antibiotics per the SCIP protocol. The knee was examined under anesthesia. Lachman test was negative. Anterior and posterior drawer signs were negative. There was no medial or lateral instability. Pivot shift sign was negative. The leg was prepped and draped and applied an arthroscopic leg blanco. The joint was examined in a systematic fashion. Arthroscopic portals were established. The patellofemoral joint was visualized. Findings in the patellofemoral joint included no significant chondromalacia.      The scope was then repositioned into the medial joint space. The intraoperative findings of the medial joint space included no significant chondromalacia but there is a parrot-beak tear of the posterior horn of the medial meniscus was debrided with a basket and shaver and saucerized anteriorly.  The ACL and PCL were taut and intact with probing. The scope was then introduced into the lateral joint space. The intraoperative findings on the lateral joint space included pristine to probing.  The medial and lateral gutters were  carefully inspected and some loose fragments of articular cartilage were identified and removed. Arthroscopic fluid was evacuated from the joint. The portals were carefully approximated. Intraarticular analgesic was injected for postoperative analgesia. Occlusive dressing was applied. Sponge, gauze and needle count was correct. No complications were encountered.  Patient was reversed from anesthetic and taken from the operating room to the recovery room in stable, satisfactory condition.       Raúl Noyola MD     Date: 5/2/2024  Time: 09:06 EDT

## 2024-05-02 NOTE — ANESTHESIA POSTPROCEDURE EVALUATION
Patient: Jose Louie    Procedure Summary       Date: 05/02/24 Room / Location: [facility] OR  / [facility]    Anesthesia Start: 0826 Anesthesia Stop: 0912    Procedure: KNEE ARTHROSCOPY WITH PARTIAL MEDIAL MENISCECTOMY (Right) Diagnosis:       Acute medial meniscus tear of right knee, initial encounter      (Acute medial meniscus tear of right knee, initial encounter [S83.241A])    Surgeons: Raúl Noyola MD Provider: Katarina Temple MD    Anesthesia Type: general ASA Status: 2            Anesthesia Type: general    Vitals  Vitals Value Taken Time   /76 05/02/24 0945   Temp     Pulse 71 05/02/24 0946   Resp     SpO2 95 % 05/02/24 0946   Vitals shown include unfiled device data.        Post Anesthesia Care and Evaluation    Patient location during evaluation: bedside  Patient participation: complete - patient participated  Level of consciousness: awake  Pain management: adequate    Airway patency: patent  PONV Status: none  Cardiovascular status: acceptable and stable  Respiratory status: acceptable  Hydration status: acceptable    Comments: An Anesthesiologist personally participated in the most demanding procedures (including induction and emergence if applicable) in the anesthesia plan, monitored the course of anesthesia administration at frequent intervals and remained physically present and available for immediate diagnosis and treatment of emergencies.

## 2024-05-02 NOTE — OP NOTE
"H and p      Chief Complaint  Initial Evaluation of the Right Knee        Subjective  Jose Louie presents to [facility] for initial evaluation of the right knee. He has pain for about 3 months on the medial aspect of the knee.  He exercises, but has had no injury or fall.  He has pain with twisting and turning of the knee.  He has had milt swelling.  He notes weakness in the right knee.  He has difficulty with stooping, squatting and stairs.       Allergies   No Known Allergies         Social History   Social History            Socioeconomic History    Marital status:    Tobacco Use    Smoking status: Never    Smokeless tobacco: Never    Tobacco comments:       History of dip tobacco   Vaping Use    Vaping status: Never Used   Substance and Sexual Activity    Alcohol use: Yes       Comment: socially    Drug use: Never    Sexual activity: Yes       Partners: Female       Birth control/protection: Vasectomy, Same-sex partner            I reviewed the patient's chief complaint, history of present illness, review of systems, past medical history, surgical history, family history, social history, medications, and allergy list.      Review of Systems      Constitutional: Denies fevers, chills, weight loss  Cardiovascular: Denies chest pain, shortness of breath  Skin: Denies rashes, acute skin changes  Neurologic: Denies headache, loss of consciousness           Vital Signs:   /70   Pulse 72   Ht 175.3 cm (69\")   Wt 99.8 kg (220 lb)   SpO2 96%   BMI 32.49 kg/m²           Physical Exam  General: Alert. No acute distress     Ortho Exam          RIGHT KNEE Flexion 125. Extension 0. Stable to varus/valgus stress. Stable to anterior/posterior drawer. Neurovascularly intact. Positive Alexsandra. Negative Lachman. Positive EHL, FHL, HS and TA. Sensation intact to light touch all 5 nerves of the foot. Ambulates with Antalgic gait. Patella is well tracking. Calf supple, " non-tender. Positive tenderness to the medial joint line. Negative tenderness to the lateral joint line. Negative Crepitus. Good strength to hamstrings, quadriceps, dorsiflexors, and plantar flexors.  Knee Extensor Mechanism intact         Procedures           Imaging Results (Most Recent)         None                      Result Review  :      Narrative & Impression   PROCEDURE:XR KNEE 3 VW RIGHT     COMPARISON:None     INDICATIONS:right knee pain     FINDINGS:          There is marginal osteophyte formation along the intercondylar notch and along the medial joint   line.  There is mild chondrocalcinosis.  There is mild patellofemoral joint disease.  There is no   joint effusion.     IMPRESSION:                 1. Early radiographic changes of osteoarthritis.            MRI 3/27/24  PROCEDURE: MRI of the right knee without contrast  TECHNIQUE: Multiplanar multisequence imaging through the region of interest  COMPARISON: None  FINDINGS: There is a tear involving the posterior horn of the medial meniscus towards the tibial surface on series 5, image 22.  Degenerative type signal noted in the anterior horn of the lateral meniscus.  Posterior cruciate ligament remains intact. The anterior cruciate ligament remains intact.  The quadriceps tendon remains intact. There is a 2-3 mm partial-thickness tearing in the proximal patellar tendon. There is  moderate degree of edema in the prepatellar soft tissues.  Medial collateral ligament remains intact. Lateral collateral ligament remains intact. There is tendinosis of the popliteus.  Biceps femoris and iliotibial band remains intact.  There is trace tenosynovitis. Sartorius, gracilis and semitendinosis remains intact.  Tibiofibular ligament, lateral patellofemoral ligament and medial patellofemoral ligament remains intact.  There is a fissure in the patellar cartilage extending to the cortex in the central aspect of the patella on series 5, image 12. Several  fissures noted  in the lateral patellar facet cartilage on series 5, image 7 extending to the cortex. There is mild thinning, fraying and  fissuring of the cartilage. There is moderate thinning, fraying and fissuring of the cartilage over the trochlea with a 4 mm fullthickness defect on series 2, image 12 extending to the cortex. Underlying subchondral cystic changes and reactive marrow edema  noted. There is minimal thinning of the cartilage over the posterior aspect of the medial femoral condyle. The medial  compartmental cartilage demonstrates mild fraying and fissuring. The lateral compartmental cartilage demonstrates mild thinning,  fraying and fissuring. Tricompartmental mild degree of marginal osteophytosis noted.  IMPRESSION:  Tear involving the posterior horn of the medial meniscus  Degenerative type signal versus contusion of the anterior horn of the lateral meniscus  Cruciates remain intact  Partial-thickness tearing of the proximal patellar tendon  Collaterals remain intact  Patellofemoral compartmental cartilage disease                 Assessment  Assessment and Plan      Diagnoses and all orders for this visit:     1. Right knee pain, unspecified chronicity (Primary)     2. Acute medial meniscus tear of right knee, initial encounter           Discussed the treatment plan with the patient. I reviewed the X-rays that were obtained today with the patient.      Discussed the treatment options with the patient, operative vs non-operative. The patient is a candidate for a right knee arthroscopy whenever he is ready.      The patient expressed understanding and wished to proceed with a Right knee arthroscopy, partial medial meniscectomy, possible chondroplasty      Discussed surgery., Risks/benefits discussed with patient including, but not limited to: infection, bleeding, neurovascular damage, re-rupture, aesthetic deformity, need for further surgery, and death., Surgery pamphlet given., and Call or return if worsening  symptoms.     Follow Up      2 weeks postoperatively       Raúl Noyola MD  05/02/24

## 2024-05-02 NOTE — H&P
"H and p      Chief Complaint  Initial Evaluation of the Right Knee        Subjective  Jose Louie presents to [facility] for initial evaluation of the right knee. He has pain for about 3 months on the medial aspect of the knee.  He exercises, but has had no injury or fall.  He has pain with twisting and turning of the knee.  He has had milt swelling.  He notes weakness in the right knee.  He has difficulty with stooping, squatting and stairs.       Allergies   No Known Allergies         Social History   Social History            Socioeconomic History    Marital status:    Tobacco Use    Smoking status: Never    Smokeless tobacco: Never    Tobacco comments:       History of dip tobacco   Vaping Use    Vaping status: Never Used   Substance and Sexual Activity    Alcohol use: Yes       Comment: socially    Drug use: Never    Sexual activity: Yes       Partners: Female       Birth control/protection: Vasectomy, Same-sex partner            I reviewed the patient's chief complaint, history of present illness, review of systems, past medical history, surgical history, family history, social history, medications, and allergy list.      Review of Systems      Constitutional: Denies fevers, chills, weight loss  Cardiovascular: Denies chest pain, shortness of breath  Skin: Denies rashes, acute skin changes  Neurologic: Denies headache, loss of consciousness           Vital Signs:   /70   Pulse 72   Ht 175.3 cm (69\")   Wt 99.8 kg (220 lb)   SpO2 96%   BMI 32.49 kg/m²           Physical Exam  General: Alert. No acute distress     Ortho Exam          RIGHT KNEE Flexion 125. Extension 0. Stable to varus/valgus stress. Stable to anterior/posterior drawer. Neurovascularly intact. Positive Alexsandra. Negative Lachman. Positive EHL, FHL, HS and TA. Sensation intact to light touch all 5 nerves of the foot. Ambulates with Antalgic gait. Patella is well tracking. Calf supple, " non-tender. Positive tenderness to the medial joint line. Negative tenderness to the lateral joint line. Negative Crepitus. Good strength to hamstrings, quadriceps, dorsiflexors, and plantar flexors.  Knee Extensor Mechanism intact         Procedures           Imaging Results (Most Recent)         None                      Result Review  :      Narrative & Impression   PROCEDURE:XR KNEE 3 VW RIGHT     COMPARISON:None     INDICATIONS:right knee pain     FINDINGS:          There is marginal osteophyte formation along the intercondylar notch and along the medial joint   line.  There is mild chondrocalcinosis.  There is mild patellofemoral joint disease.  There is no   joint effusion.     IMPRESSION:                 1. Early radiographic changes of osteoarthritis.            MRI 3/27/24  PROCEDURE: MRI of the right knee without contrast  TECHNIQUE: Multiplanar multisequence imaging through the region of interest  COMPARISON: None  FINDINGS: There is a tear involving the posterior horn of the medial meniscus towards the tibial surface on series 5, image 22.  Degenerative type signal noted in the anterior horn of the lateral meniscus.  Posterior cruciate ligament remains intact. The anterior cruciate ligament remains intact.  The quadriceps tendon remains intact. There is a 2-3 mm partial-thickness tearing in the proximal patellar tendon. There is  moderate degree of edema in the prepatellar soft tissues.  Medial collateral ligament remains intact. Lateral collateral ligament remains intact. There is tendinosis of the popliteus.  Biceps femoris and iliotibial band remains intact.  There is trace tenosynovitis. Sartorius, gracilis and semitendinosis remains intact.  Tibiofibular ligament, lateral patellofemoral ligament and medial patellofemoral ligament remains intact.  There is a fissure in the patellar cartilage extending to the cortex in the central aspect of the patella on series 5, image 12. Several  fissures noted  in the lateral patellar facet cartilage on series 5, image 7 extending to the cortex. There is mild thinning, fraying and  fissuring of the cartilage. There is moderate thinning, fraying and fissuring of the cartilage over the trochlea with a 4 mm fullthickness defect on series 2, image 12 extending to the cortex. Underlying subchondral cystic changes and reactive marrow edema  noted. There is minimal thinning of the cartilage over the posterior aspect of the medial femoral condyle. The medial  compartmental cartilage demonstrates mild fraying and fissuring. The lateral compartmental cartilage demonstrates mild thinning,  fraying and fissuring. Tricompartmental mild degree of marginal osteophytosis noted.  IMPRESSION:  Tear involving the posterior horn of the medial meniscus  Degenerative type signal versus contusion of the anterior horn of the lateral meniscus  Cruciates remain intact  Partial-thickness tearing of the proximal patellar tendon  Collaterals remain intact  Patellofemoral compartmental cartilage disease                 Assessment  Assessment and Plan      Diagnoses and all orders for this visit:     1. Right knee pain, unspecified chronicity (Primary)     2. Acute medial meniscus tear of right knee, initial encounter           Discussed the treatment plan with the patient. I reviewed the X-rays that were obtained today with the patient.      Discussed the treatment options with the patient, operative vs non-operative. The patient is a candidate for a right knee arthroscopy whenever he is ready.      The patient expressed understanding and wished to proceed with a Right knee arthroscopy, partial medial meniscectomy, possible chondroplasty      Discussed surgery., Risks/benefits discussed with patient including, but not limited to: infection, bleeding, neurovascular damage, re-rupture, aesthetic deformity, need for further surgery, and death., Surgery pamphlet given., and Call or return if worsening  symptoms.     Follow Up      2 weeks postoperatively   Raúl Noyola MD  /today

## 2024-05-03 ENCOUNTER — PATIENT MESSAGE (OUTPATIENT)
Dept: INTERNAL MEDICINE | Facility: CLINIC | Age: 59
End: 2024-05-03
Payer: COMMERCIAL

## 2024-05-03 NOTE — TELEPHONE ENCOUNTER
Caller: Jose Louie    Relationship: Self    Best call back number: [phone]     Requested Prescriptions:   Requested Prescriptions     Pending Prescriptions Disp Refills    desvenlafaxine (Pristiq) 50 MG 24 hr tablet       Sig: Take 1 tablet by mouth Daily.        Pharmacy where request should be sent: [facility]     Last office visit with prescribing clinician: 4/22/2024   Last telemedicine visit with prescribing clinician: Visit date not found   Next office visit with prescribing clinician: Visit date not found     Additional details provided by patient: PATIENT STATES THAT PRESCRIPTION DOSAGE WAS CHANGED DURING HIS LAST VISIT BUT NEVER CALLED IN. PATIENT HAS BEEN DOUBLING OLD PRESCRIPTION OF 25 MG TABLETS TO MAKE UP THE 50MG DOSAGE.     Does the patient have less than a 3 day supply:  [x] Yes  [] No    Would you like a call back once the refill request has been completed: [] Yes [] No    If the office needs to give you a call back, can they leave a voicemail: [] Yes [] No    Kumar Walker Rep   05/03/24 09:30 EDT

## 2024-05-04 RX ORDER — DESVENLAFAXINE SUCCINATE 50 MG/1
50 TABLET, EXTENDED RELEASE ORAL DAILY
Start: 2024-05-04 | End: 2024-05-06 | Stop reason: SDUPTHER

## 2024-05-06 RX ORDER — DESVENLAFAXINE SUCCINATE 50 MG/1
50 TABLET, EXTENDED RELEASE ORAL DAILY
Qty: 90 TABLET | Refills: 1 | Status: SHIPPED | OUTPATIENT
Start: 2024-05-06

## 2024-05-15 ENCOUNTER — OFFICE VISIT (OUTPATIENT)
Dept: ORTHOPEDIC SURGERY | Facility: CLINIC | Age: 59
End: 2024-05-15
Payer: COMMERCIAL

## 2024-05-15 VITALS
BODY MASS INDEX: 33.33 KG/M2 | DIASTOLIC BLOOD PRESSURE: 78 MMHG | HEIGHT: 69 IN | WEIGHT: 225 LBS | HEART RATE: 93 BPM | SYSTOLIC BLOOD PRESSURE: 126 MMHG | OXYGEN SATURATION: 95 %

## 2024-05-15 DIAGNOSIS — Z98.890 S/P RIGHT KNEE ARTHROSCOPY: Primary | ICD-10-CM

## 2024-05-15 PROCEDURE — 99024 POSTOP FOLLOW-UP VISIT: CPT

## 2024-05-15 NOTE — PROGRESS NOTES
"Chief Complaint  Follow-up and Pain of the Right Knee and Suture / Staple Removal    Subjective      Jose Louie presents to [facility] for 2-week follow-up of right knee arthroscopy with partial medial meniscectomy with Dr. Noyola on 5/2/2024.  He is currently attending physical therapy with [facility] and doing well.  Reports that he is not having any pain and is no longer taking pain medication.  He is ambulatory without use of assistive devices and reports that he is able to get back to normal activities.  He is here today for suture removal follow-up.    Objective   No Known Allergies    Vital Signs:   /78   Pulse 93   Ht 175.3 cm (69\")   Wt 102 kg (225 lb)   SpO2 95%   BMI 33.23 kg/m²       Physical Exam    Constitutional: Awake, alert. Well nourished appearance.    Integumentary: Warm, dry, intact. No obvious rashes.    HENT: Atraumatic, normocephalic.   Respiratory: Non labored respirations .   Cardiovascular: Intact peripheral pulses.    Psychiatric: Normal mood and affect. A&O X3    Ortho Exam  Right knee: Incisions are well-approximated healing appropriately.  There is no edema noted.  Neurovascular intact.  Sensation is intact.  Stable to varus and valgus stress.  Stable to anterior posterior drawer test.  Range of motion 0 to 130 degrees.  Nonantalgic gait.    Imaging Results (Most Recent)       None                      Assessment and Plan   Problem List Items Addressed This Visit    None  Visit Diagnoses       S/P right knee arthroscopy    -  Primary            Follow Up   Return if symptoms worsen or fail to improve.    Patient is a non-smoker, did not discuss options for smoking cessation.     Social History     Socioeconomic History    Marital status:    Tobacco Use    Smoking status: Never    Smokeless tobacco: Never    Tobacco comments:     History of dip tobacco   Vaping Use    Vaping status: Never Used   Substance and Sexual Activity    Alcohol " use: Yes     Comment: socially    Drug use: Never    Sexual activity: Yes     Partners: Female     Birth control/protection: Vasectomy, Same-sex partner       Patient Instructions   Sutures removed in office. Patient educated on incision care.  Please keep incision clean and dry.  Do not soak or submerge in water until incision is fully healed.  Do not apply creams or lotions over the incision.      Continue icing as needed to help with pain and swelling.  Ice knee up to 3 or 4 times daily for no longer than 15 to 20 minutes at a time.    Continue PT to progress ROM, strength, and weightbearing status.    Follow-up as needed. Repeat x-rays not needed at this visit.  Please call with questions or concerns.   Patient was given instructions and counseling regarding his condition or for health maintenance advice. Please see specific information pulled into the AVS if appropriate.

## 2024-05-15 NOTE — PATIENT INSTRUCTIONS
Sutures removed in office. Patient educated on incision care.  Please keep incision clean and dry.  Do not soak or submerge in water until incision is fully healed.  Do not apply creams or lotions over the incision.      Continue icing as needed to help with pain and swelling.  Ice knee up to 3 or 4 times daily for no longer than 15 to 20 minutes at a time.    Continue PT to progress ROM, strength, and weightbearing status.    Follow-up as needed. Repeat x-rays not needed at this visit.  Please call with questions or concerns.

## 2024-05-30 ENCOUNTER — PATIENT MESSAGE (OUTPATIENT)
Dept: INTERNAL MEDICINE | Facility: CLINIC | Age: 59
End: 2024-05-30
Payer: COMMERCIAL

## 2024-06-03 NOTE — TELEPHONE ENCOUNTER
From: Jose Louie  To: Vielka Mauricio  Sent: 5/30/2024 4:53 AM EDT  Subject: gene test for antidepressants    We got the bill for the swab gene test on antidepressants. Did you get the results to see if Pristique is the recommended med? His mood seems to be better after increasing the dose. We have his neurodiagnostic appointment late June [place]

## 2024-06-13 DIAGNOSIS — E29.1 HYPOGONADISM IN MALE: ICD-10-CM

## 2024-06-13 NOTE — TELEPHONE ENCOUNTER
Caller: Jose Louie    Relationship: Self    Best call back number: [phone]     Requested Prescriptions:   Requested Prescriptions     Pending Prescriptions Disp Refills    Testosterone Cypionate (DEPOTESTOTERONE CYPIONATE) 200 MG/ML injection 4 mL 2     Sig: Inject 1 mL into the appropriate muscle as directed by prescriber Every 7 (Seven) Days.        Pharmacy where request should be sent: [facility]     Last office visit with prescribing clinician: 4/22/2024   Last telemedicine visit with prescribing clinician: Visit date not found   Next office visit with prescribing clinician: Visit date not found     Additional details provided by patient: PATIENT IS NEEDING A REFILL.     Does the patient have less than a 3 day supply:  [] Yes  [x] No    Would you like a call back once the refill request has been completed: [x] Yes [] No    If the office needs to give you a call back, can they leave a voicemail: [x] Yes [] No    Kumar Overton   06/13/24 11:40 EDT

## 2024-06-14 RX ORDER — TESTOSTERONE CYPIONATE 200 MG/ML
200 INJECTION, SOLUTION INTRAMUSCULAR
Qty: 4 ML | Refills: 2 | Status: SHIPPED | OUTPATIENT
Start: 2024-06-14

## 2024-06-14 NOTE — TELEPHONE ENCOUNTER
Last follow up visit date: 4/22/24    Last urine drug screen date: 3/15/24    Last consent/contract date:3/19/24    Does patient utilize [facility] pharmacy (yes or no)? No    3/15/24  Testosterone total 713  Testosterone free 22.5

## 2024-08-09 RX ORDER — PANTOPRAZOLE SODIUM 40 MG/1
TABLET, DELAYED RELEASE ORAL
Qty: 270 TABLET | Refills: 2 | Status: SHIPPED | OUTPATIENT
Start: 2024-08-09

## 2024-08-26 RX ORDER — CLOBETASOL PROPIONATE 0.5 MG/G
1 CREAM TOPICAL 2 TIMES DAILY
Qty: 60 G | Refills: 2 | Status: CANCELLED | OUTPATIENT
Start: 2024-08-26

## 2024-08-28 NOTE — TELEPHONE ENCOUNTER
The original prescription was discontinued on 8/14/2023 by Vielka Mauricio MD. Renewing this prescription may not be appropriate.

## 2024-08-30 RX ORDER — CLOBETASOL PROPIONATE 0.5 MG/G
1 CREAM TOPICAL 2 TIMES DAILY
Qty: 60 G | Refills: 2 | Status: SHIPPED | OUTPATIENT
Start: 2024-08-30

## 2024-08-30 RX ORDER — ALLOPURINOL 100 MG/1
200 TABLET ORAL DAILY
Qty: 180 TABLET | Refills: 1 | Status: SHIPPED | OUTPATIENT
Start: 2024-08-30

## 2024-09-18 DIAGNOSIS — E29.1 HYPOGONADISM IN MALE: ICD-10-CM

## 2024-09-18 RX ORDER — TESTOSTERONE CYPIONATE 200 MG/ML
200 INJECTION, SOLUTION INTRAMUSCULAR
Qty: 4 ML | Refills: 2 | Status: SHIPPED | OUTPATIENT
Start: 2024-09-18

## 2024-09-23 RX ORDER — OLMESARTAN MEDOXOMIL 40 MG/1
40 TABLET ORAL DAILY
Qty: 90 TABLET | Refills: 1 | Status: SHIPPED | OUTPATIENT
Start: 2024-09-23

## 2024-10-17 RX ORDER — DESVENLAFAXINE 50 MG/1
50 TABLET, FILM COATED, EXTENDED RELEASE ORAL DAILY
Qty: 90 TABLET | Refills: 1 | Status: SHIPPED | OUTPATIENT
Start: 2024-10-17

## 2024-12-11 RX ORDER — LEVOTHYROXINE SODIUM 150 UG/1
150 TABLET ORAL DAILY
Qty: 90 TABLET | Refills: 3 | Status: SHIPPED | OUTPATIENT
Start: 2024-12-11

## 2024-12-23 DIAGNOSIS — E29.1 HYPOGONADISM IN MALE: ICD-10-CM

## 2024-12-23 RX ORDER — TESTOSTERONE CYPIONATE 200 MG/ML
200 INJECTION, SOLUTION INTRAMUSCULAR
Qty: 4 ML | Refills: 2 | Status: SHIPPED | OUTPATIENT
Start: 2024-12-23

## 2024-12-23 NOTE — TELEPHONE ENCOUNTER
Rx Refill Note  Requested Prescriptions     Pending Prescriptions Disp Refills    Testosterone Cypionate (DEPOTESTOTERONE CYPIONATE) 200 MG/ML injection 4 mL 2     Sig: Inject 1 mL into the appropriate muscle as directed by prescriber Every 7 (Seven) Days.      Last office visit with prescribing clinician: 4/22/2024   Last telemedicine visit with prescribing clinician: Visit date not found   Next office visit with prescribing clinician: Visit date not found                         Would you like a call back once the refill request has been completed: [] Yes [] No    If the office needs to give you a call back, can they leave a voicemail: [] Yes [] No    Juanita Monson MA  12/23/24, 11:17 EST

## 2024-12-23 NOTE — TELEPHONE ENCOUNTER
Last follow up visit date: 4/22/24    Last urine drug screen date: n/a    Last consent/contract date: 3/15/24    Does patient utilize [facility] pharmacy (yes or no)?

## 2025-02-28 ENCOUNTER — TELEPHONE (OUTPATIENT)
Dept: INTERNAL MEDICINE | Facility: CLINIC | Age: 60
End: 2025-02-28
Payer: COMMERCIAL

## 2025-02-28 DIAGNOSIS — I10 PRIMARY HYPERTENSION: ICD-10-CM

## 2025-02-28 DIAGNOSIS — E78.2 MIXED HYPERLIPIDEMIA: ICD-10-CM

## 2025-02-28 DIAGNOSIS — E06.3 HYPOTHYROIDISM DUE TO HASHIMOTO THYROIDITIS: Primary | ICD-10-CM

## 2025-02-28 DIAGNOSIS — M1A.0710 CHRONIC GOUT OF RIGHT ANKLE, UNSPECIFIED CAUSE: ICD-10-CM

## 2025-02-28 NOTE — TELEPHONE ENCOUNTER
Caller: Jose Louie    Relationship: Self    Best call back number: [phone]    What orders are you requesting (i.e. lab or imaging): BLOOD WORK     In what timeframe would the patient need to come in: BEFORE NEXT APPOINTMENT 03.24.2025

## 2025-03-03 NOTE — TELEPHONE ENCOUNTER
Pt return call to the office, explained to pt labs have been ordered, pt verbalized understanding and stated he will have labs drawn at the hospital.

## 2025-03-10 ENCOUNTER — LAB (OUTPATIENT)
Facility: HOSPITAL | Age: 60
End: 2025-03-10
Payer: COMMERCIAL

## 2025-03-10 LAB
ALBUMIN SERPL-MCNC: 4.2 G/DL (ref 3.5–5.2)
ALBUMIN/GLOB SERPL: 1.5 G/DL
ALP SERPL-CCNC: 63 U/L (ref 39–117)
ALT SERPL W P-5'-P-CCNC: 47 U/L (ref 1–41)
ANION GAP SERPL CALCULATED.3IONS-SCNC: 10.4 MMOL/L (ref 5–15)
AST SERPL-CCNC: 53 U/L (ref 1–40)
BASOPHILS # BLD AUTO: 0.02 10*3/MM3 (ref 0–0.2)
BASOPHILS NFR BLD AUTO: 0.4 % (ref 0–1.5)
BILIRUB SERPL-MCNC: 0.4 MG/DL (ref 0–1.2)
BUN SERPL-MCNC: 13 MG/DL (ref 6–20)
BUN/CREAT SERPL: 9.9 (ref 7–25)
CALCIUM SPEC-SCNC: 8.8 MG/DL (ref 8.6–10.5)
CHLORIDE SERPL-SCNC: 104 MMOL/L (ref 98–107)
CHOLEST SERPL-MCNC: 225 MG/DL (ref 0–200)
CO2 SERPL-SCNC: 22.6 MMOL/L (ref 22–29)
CREAT SERPL-MCNC: 1.31 MG/DL (ref 0.76–1.27)
DEPRECATED RDW RBC AUTO: 45.2 FL (ref 37–54)
EGFRCR SERPLBLD CKD-EPI 2021: 63.1 ML/MIN/1.73
EOSINOPHIL # BLD AUTO: 0.21 10*3/MM3 (ref 0–0.4)
EOSINOPHIL NFR BLD AUTO: 4.3 % (ref 0.3–6.2)
ERYTHROCYTE [DISTWIDTH] IN BLOOD BY AUTOMATED COUNT: 12.8 % (ref 12.3–15.4)
GLOBULIN UR ELPH-MCNC: 2.8 GM/DL
GLUCOSE SERPL-MCNC: 83 MG/DL (ref 65–99)
HCT VFR BLD AUTO: 49.9 % (ref 37.5–51)
HDLC SERPL-MCNC: 38 MG/DL (ref 40–60)
HGB BLD-MCNC: 17.1 G/DL (ref 13–17.7)
IMM GRANULOCYTES # BLD AUTO: 0.03 10*3/MM3 (ref 0–0.05)
IMM GRANULOCYTES NFR BLD AUTO: 0.6 % (ref 0–0.5)
LDLC SERPL CALC-MCNC: 142 MG/DL (ref 0–100)
LDLC/HDLC SERPL: 3.62 {RATIO}
LYMPHOCYTES # BLD AUTO: 1.38 10*3/MM3 (ref 0.7–3.1)
LYMPHOCYTES NFR BLD AUTO: 28.2 % (ref 19.6–45.3)
MCH RBC QN AUTO: 32.8 PG (ref 26.6–33)
MCHC RBC AUTO-ENTMCNC: 34.3 G/DL (ref 31.5–35.7)
MCV RBC AUTO: 95.6 FL (ref 79–97)
MONOCYTES # BLD AUTO: 0.64 10*3/MM3 (ref 0.1–0.9)
MONOCYTES NFR BLD AUTO: 13.1 % (ref 5–12)
NEUTROPHILS NFR BLD AUTO: 2.61 10*3/MM3 (ref 1.7–7)
NEUTROPHILS NFR BLD AUTO: 53.4 % (ref 42.7–76)
NRBC BLD AUTO-RTO: 0 /100 WBC (ref 0–0.2)
PLATELET # BLD AUTO: 167 10*3/MM3 (ref 140–450)
PMV BLD AUTO: 12.3 FL (ref 6–12)
POTASSIUM SERPL-SCNC: 4.5 MMOL/L (ref 3.5–5.2)
PROT SERPL-MCNC: 7 G/DL (ref 6–8.5)
PSA SERPL-MCNC: 6.06 NG/ML (ref 0–4)
RBC # BLD AUTO: 5.22 10*6/MM3 (ref 4.14–5.8)
SODIUM SERPL-SCNC: 137 MMOL/L (ref 136–145)
T4 FREE SERPL-MCNC: 0.92 NG/DL (ref 0.92–1.68)
TRIGL SERPL-MCNC: 248 MG/DL (ref 0–150)
TSH SERPL DL<=0.05 MIU/L-ACNC: 3.86 UIU/ML (ref 0.27–4.2)
URATE SERPL-MCNC: 6.5 MG/DL (ref 3.4–7)
VLDLC SERPL-MCNC: 45 MG/DL (ref 5–40)
WBC NRBC COR # BLD AUTO: 4.89 10*3/MM3 (ref 3.4–10.8)

## 2025-03-10 PROCEDURE — 84550 ASSAY OF BLOOD/URIC ACID: CPT | Performed by: INTERNAL MEDICINE

## 2025-03-10 PROCEDURE — 80061 LIPID PANEL: CPT | Performed by: INTERNAL MEDICINE

## 2025-03-10 PROCEDURE — 80050 GENERAL HEALTH PANEL: CPT | Performed by: INTERNAL MEDICINE

## 2025-03-10 PROCEDURE — 84153 ASSAY OF PSA TOTAL: CPT | Performed by: INTERNAL MEDICINE

## 2025-03-10 PROCEDURE — 84439 ASSAY OF FREE THYROXINE: CPT | Performed by: INTERNAL MEDICINE

## 2025-03-10 PROCEDURE — 84403 ASSAY OF TOTAL TESTOSTERONE: CPT | Performed by: INTERNAL MEDICINE

## 2025-03-10 PROCEDURE — 84402 ASSAY OF FREE TESTOSTERONE: CPT | Performed by: INTERNAL MEDICINE

## 2025-03-15 LAB
TESTOST FREE SERPL-MCNC: 23.7 PG/ML (ref 7.2–24)
TESTOST SERPL-MCNC: 669 NG/DL (ref 264–916)

## 2025-03-16 DIAGNOSIS — E29.1 HYPOGONADISM IN MALE: ICD-10-CM

## 2025-03-17 RX ORDER — TESTOSTERONE CYPIONATE 200 MG/ML
200 INJECTION, SOLUTION INTRAMUSCULAR
Qty: 4 ML | Refills: 2 | Status: SHIPPED | OUTPATIENT
Start: 2025-03-17

## 2025-03-17 NOTE — TELEPHONE ENCOUNTER
Last follow up: 4/22/2024    Last urine drug screen: 3/15/2024    Last contract/consent date: 3/15/2024    Does pt utilize [facility]: No

## 2025-03-24 ENCOUNTER — OFFICE VISIT (OUTPATIENT)
Dept: INTERNAL MEDICINE | Facility: CLINIC | Age: 60
End: 2025-03-24
Payer: COMMERCIAL

## 2025-03-24 ENCOUNTER — PATIENT MESSAGE (OUTPATIENT)
Dept: INTERNAL MEDICINE | Facility: CLINIC | Age: 60
End: 2025-03-24

## 2025-03-24 VITALS
OXYGEN SATURATION: 95 % | WEIGHT: 231.8 LBS | HEART RATE: 89 BPM | HEIGHT: 69 IN | BODY MASS INDEX: 34.33 KG/M2 | RESPIRATION RATE: 20 BRPM | DIASTOLIC BLOOD PRESSURE: 72 MMHG | SYSTOLIC BLOOD PRESSURE: 128 MMHG | TEMPERATURE: 98.8 F

## 2025-03-24 DIAGNOSIS — I10 PRIMARY HYPERTENSION: ICD-10-CM

## 2025-03-24 DIAGNOSIS — E06.3 HYPOTHYROIDISM DUE TO HASHIMOTO THYROIDITIS: ICD-10-CM

## 2025-03-24 DIAGNOSIS — F41.9 ANXIETY: ICD-10-CM

## 2025-03-24 DIAGNOSIS — E78.2 MIXED HYPERLIPIDEMIA: ICD-10-CM

## 2025-03-24 DIAGNOSIS — M72.2 PLANTAR FASCIITIS: Primary | ICD-10-CM

## 2025-03-24 DIAGNOSIS — M1A.0710 CHRONIC GOUT OF RIGHT ANKLE, UNSPECIFIED CAUSE: ICD-10-CM

## 2025-03-24 DIAGNOSIS — R97.20 ELEVATED PSA: ICD-10-CM

## 2025-03-24 DIAGNOSIS — R97.20 ELEVATED PSA: Primary | ICD-10-CM

## 2025-03-24 DIAGNOSIS — E29.1 HYPOGONADISM IN MALE: ICD-10-CM

## 2025-03-24 PROCEDURE — 99214 OFFICE O/P EST MOD 30 MIN: CPT | Performed by: INTERNAL MEDICINE

## 2025-03-24 RX ORDER — PANTOPRAZOLE SODIUM 20 MG/1
20 TABLET, DELAYED RELEASE ORAL DAILY
Qty: 90 TABLET | Refills: 1 | Status: SHIPPED | OUTPATIENT
Start: 2025-03-24

## 2025-03-24 NOTE — PROGRESS NOTES
Chief Complaint  Anxiety (Follow up ), Hypertension, Hypogonadism, Cough (Ongoing for a while. Would like to discuss inhaler ), and Results (Labwork )      Subjective      History of Present Illness  The patient presents for evaluation of plantar fasciitis, stress, sinus pressure, elevated PSA, and GERD.    He experienced severe plantar fasciitis in his left foot throughout January and February 2025, which has not completely resolved. He received an [facility] mid to late February 2025. Despite rehabilitation exercises, he did not observe improvement until a popping sensation occurred in his foot. He manages the pain with daily ibuprofen.    Pristiq has been effective for stress, but due to cost, he is considering switching back to Lexapro, which was beneficial in the past.    He has experienced sinus pressure and nasal discharge, particularly severe 2 weeks ago. The discharge color varied from yellow to green, then clear, and back to yellow. He has managed symptoms with Xyzal for the past 1.5 to 2 weeks and takes Sudafed long-acting for severe symptoms, aware of its potential impact on blood pressure.    He has been on testosterone therapy, typically administered on weekends, with no significant changes. He cycles a few times a week and has observed changes in urinary habits.    He discontinued supplements over a week ago due to cramps. He reports no chest pain or respiratory distress. He has been prescribed various holistic treatments and started beet supplements for heart health. He has not received the shingles vaccine.    He has been inconsistent with stomach medication, often discontinuing it. He experiences heartburn after about a week without the medication and has not tried a lower dose.             Objective   Vital Signs:   Vitals:    03/24/25 0810   BP: 128/72   BP Location: Right arm   Patient Position: Sitting   Cuff Size: Large Adult   Pulse: 89   Resp: 20  "  Temp: 98.8 °F (37.1 °C)   TempSrc: Temporal   SpO2: 95%   Weight: 105 kg (231 lb 12.8 oz)   Height: 175.3 cm (69.02\")     Body mass index is 34.22 kg/m².    Wt Readings from Last 3 Encounters:   03/24/25 105 kg (231 lb 12.8 oz)   05/15/24 102 kg (225 lb)   05/02/24 102 kg (225 lb 1.4 oz)     BP Readings from Last 3 Encounters:   03/24/25 128/72   05/15/24 126/78   05/02/24 107/76       Health Maintenance   Topic Date Due    Pneumococcal Vaccine 50+ (1 of 1 - PCV) Never done    ZOSTER VACCINE (1 of 2) Never done    ANNUAL PHYSICAL  Never done    COVID-19 Vaccine (6 - 2024-25 season) 09/01/2024    INFLUENZA VACCINE  09/20/2025 (Originally 7/1/2024)    BMI FOLLOWUP  05/02/2025    LIPID PANEL  03/10/2026    TDAP/TD VACCINES (3 - Td or Tdap) 07/21/2027    COLORECTAL CANCER SCREENING  01/20/2030    HEPATITIS C SCREENING  Completed       Physical Exam  Vitals reviewed.   Constitutional:       Appearance: Normal appearance. He is well-developed.   HENT:      Head: Normocephalic and atraumatic.      Right Ear: External ear normal.      Left Ear: External ear normal.   Eyes:      Conjunctiva/sclera: Conjunctivae normal.      Pupils: Pupils are equal, round, and reactive to light.   Cardiovascular:      Rate and Rhythm: Normal rate and regular rhythm.      Heart sounds: No murmur heard.     No friction rub. No gallop.   Pulmonary:      Effort: Pulmonary effort is normal.      Breath sounds: Normal breath sounds. No wheezing or rhonchi.   Skin:     General: Skin is warm and dry.   Neurological:      Mental Status: He is alert and oriented to person, place, and time.   Psychiatric:         Mood and Affect: Affect normal.         Behavior: Behavior normal.         Thought Content: Thought content normal.        Physical Exam        Result Review :  The following data was reviewed by: Vielka Mauricio MD on 03/24/2025:         Results  Reviewed most recent labs            Procedures            Assessment & Plan  Plantar " fasciitis         Anxiety         Mixed hyperlipidemia       Orders:    CT Cardiac Calcium Score Without Dye; Future    Primary hypertension           Elevated PSA    Orders:    Ambulatory Referral to Urology    Hypogonadism in male         Hypothyroidism due to Hashimoto thyroiditis         Chronic gout of right ankle, unspecified cause              Assessment & Plan  Plantar fasciitis  - Apply ice for 20 minutes twice daily  - Perform stretching exercises  - Use anti-inflammatories with caution due to slightly elevated kidney function  - Ensure adequate hydration  - Temporarily discontinue supplements    Stress  - Consider reducing Pristiq to 25 mg before transitioning back to Lexapro if cost is a concern  - Inform us of any changes    Sinus pressure  - If symptoms persist, contact via [employer] or inform Callie for an antibiotic prescription    Elevated PSA  - Referral to Dr. Henning  - Hold off on testosterone injections until after consultation    GERD  - Take Protonix 20 mg daily for 3 months  - May switch to Pepcid if discontinuing Protonix after 3 months    Health maintenance  - Blood pressure readings normal today  - Cholesterol levels consistent with previous readings  - Uric acid level and blood count within normal limits  - Maintain a healthy diet rich in fruits and vegetables  - Order coronary artery calcium screening    Follow-up  - Follow up in 3 months for testosterone management  - If urology does not address elevated PSA, schedule an earlier appointment        Patient or patient representative verbalized consent for the use of Ambient Listening during the visit with  Vielka Mauricio MD for chart documentation. 3/24/2025  08:38 EDT      FOLLOW UP  Return in about 3 months (around 6/24/2025).  Patient was given instructions and counseling regarding his condition or for health maintenance advice. Please see specific information pulled into the AVS if appropriate.     Vielka Mauricio,  MD  03/24/25  08:49 EDT    CURRENT & DISCONTINUED MEDICATIONS  Current Outpatient Medications   Medication Instructions    allopurinol (ZYLOPRIM) 200 mg, Oral, Daily    colchicine 0.6 MG tablet Take 1 tablet by mouth daily for 3 days in a row for gout flare    desvenlafaxine (PRISTIQ) 50 mg, Oral, Daily    levothyroxine (SYNTHROID, LEVOTHROID) 150 mcg, Oral, Daily    olmesartan (BENICAR) 40 mg, Oral, Daily    pantoprazole (PROTONIX) 20 mg, Oral, Daily    Testosterone Cypionate (DEPOTESTOTERONE CYPIONATE) 200 mg, Intramuscular, Every 7 Days       Medications Discontinued During This Encounter   Medication Reason    azelaic acid (AZELEX) 15 % gel *Therapy completed    brompheniramine-pseudoephedrine-DM 30-2-10 MG/5ML syrup     clindamycin (CLEOCIN T) 1 % external solution *Therapy completed    HYDROcodone-acetaminophen (Norco) 7.5-325 MG per tablet     clobetasol propionate (TEMOVATE) 0.05 % cream Duplicate order    clobetasol propionate (TEMOVATE) 0.05 % cream *Therapy completed    brompheniramine-pseudoephedrine-DM 30-2-10 MG/5ML syrup Duplicate order    brompheniramine-pseudoephedrine-DM 30-2-10 MG/5ML syrup Duplicate order    pantoprazole (PROTONIX) 40 MG EC tablet

## 2025-03-25 ENCOUNTER — OFFICE VISIT (OUTPATIENT)
Dept: UROLOGY | Age: 60
End: 2025-03-25
Payer: COMMERCIAL

## 2025-03-25 VITALS — RESPIRATION RATE: 18 BRPM | BODY MASS INDEX: 33.33 KG/M2 | WEIGHT: 225 LBS | HEIGHT: 69 IN

## 2025-03-25 DIAGNOSIS — R39.12 WEAK URINARY STREAM: ICD-10-CM

## 2025-03-25 DIAGNOSIS — R97.20 ELEVATED PROSTATE SPECIFIC ANTIGEN (PSA): Primary | ICD-10-CM

## 2025-03-25 DIAGNOSIS — R35.1 NOCTURIA: ICD-10-CM

## 2025-03-25 DIAGNOSIS — R35.0 URINARY FREQUENCY: ICD-10-CM

## 2025-03-25 LAB — SPECIMEN VOL 24H UR: NORMAL L

## 2025-03-25 PROCEDURE — 99203 OFFICE O/P NEW LOW 30 MIN: CPT | Performed by: NURSE PRACTITIONER

## 2025-03-25 PROCEDURE — 51798 US URINE CAPACITY MEASURE: CPT | Performed by: NURSE PRACTITIONER

## 2025-03-25 RX ORDER — CHLORAL HYDRATE 500 MG
CAPSULE ORAL
COMMUNITY

## 2025-03-25 NOTE — PROGRESS NOTES
Chief Complaint: Elevated PSA        Subjective         History of Present Illness  Jose Louie is a 59 y.o. male presents to [facility] to be seen for elevated PSA.    The patient was found to have an elevated PSA of 6.06 on routine screening completed on 3/10/2025.  The patient has never had elevated PSA previously.  The patient does take testosterone, but reports that he has been on this medication for at least 10 years.  He reports that he is holding his testosterone for now since finding out that his PSA was elevated.  He does report that he has to urinate frequently and is usually up about 2 times per night on average.  He also reports that his urinary stream is weaker than it once was.  He denies any bothersome urinary symptoms and feels like he is emptying his bladder well.  PVR here in the office today is 3 mL.  He is not interested in treatment for BPH at this time.  He denies any dysuria or perineal pain.  The patient reports that he has discussed the elevated PSA results with his PCP and she recommended that he have an MRI of the prostate done within 1 week.      Urinary symptoms/history:  Frequency-admits     Urgency-denies     Incontinence-denies     Nocturia-admits, 2 X per night on average     Dysuria-denies     Perineal pain-denies     Stream-weaker than it once was     GH-denies     History of stones-denies      surgeries-vasectomy    Family history of  malignancy-denies     Cardiopulmonary-HTN, TYLER on CPAP     Anticoagulants-none     Smoker-denies     PSA  3/10/2025 6.06  8/21/2023 1.35  5/17/2023 1.65  11/4/2022 1.59  4/26/2021 1.51  5/5/2020 1.20  10/1/2019 1.36      Objective     Past Medical History:   Diagnosis Date    Acid reflux     Allergies     Anxiety     Arthritis     Back pain     Central serous retinopathy, 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    post surgery    CPAP use counseling 11/27/2017    Deafness     Fracture, foot     Frozen shoulder     Fungal toenail  infection 03/04/2019    Heel pain     Hemorrhoid     HTN (hypertension) 12/08/2014    Hyperlipidemia 05/31/2017    Hypogonadism in male 12/08/2014    testicular failure    Hypothyroidism     Knee swelling     Left shoulder pain     Low testosterone level in male 05/31/2017    Night sweats     Panic disorder 12/09/2014    Periarthritis of shoulder 1-4-23    Rotator cuff syndrome 1-4-23    Sleep apnea 11/27/2017       Past Surgical History:   Procedure Laterality Date    ANKLE ARTHROSCOPY W/ OPEN REPAIR      1983, 2017    ANKLE OPEN REDUCTION INTERNAL FIXATION  9-20-82    CATARACT EXTRACTION WITH INTRAOCULAR LENS IMPLANT Bilateral 01/20/2024    CATARACT EXTRACTION WITH INTRAOCULAR LENS IMPLANT Right 02/08/2024    COLONOSCOPY      1/2020; [facility], history of colono polyps    ENDOSCOPY  2020    [facility]    KNEE ARTHROSCOPY W/ MENISCECTOMY Right 5/2/2024    Procedure: KNEE ARTHROSCOPY WITH PARTIAL MEDIAL MENISCECTOMY;  Surgeon: Raúl Noyola MD;  [facility] [facility] [facility] [place] [facility]  Service: [place]opedics;  Laterality: Right;    SHOULDER ARTHROSCOPY W/ ROTATOR CUFF REPAIR Left 04/26/2023    Procedure: SHOULDER ARTHROSCOPY WITH ROTATOR CUFF REPAIR SUBACROMIAL DECOMPRESSION AND DISTAL CLAVICULECTOMY;  Surgeon: Elver Richards MD;  [place] [facility] [facility] [place] [facility]  Service: Orthopedics;  Laterality: Left;    SHOULDER SURGERY  5-3-23    VASECTOMY  1996         Current Outpatient Medications:     allopurinol (ZYLOPRIM) 100 MG tablet, Take 2 tablets by mouth Daily. (Patient taking differently: Take 1 tablet by mouth Daily.), Disp: 180 tablet, Rfl: 1    colchicine 0.6 MG tablet, Take 1 tablet by mouth daily for 3 days in a row for gout flare, Disp: 90 tablet, Rfl: 1    desvenlafaxine (Pristiq) 50 MG 24 hr tablet, Take 1 tablet by mouth Daily., Disp: 90 tablet, Rfl: 1    levothyroxine (SYNTHROID, LEVOTHROID) 150 MCG tablet, Take 1 tablet by mouth Daily., Disp: 90 tablet, Rfl: 3    olmesartan (Benicar) 40 MG tablet, Take 1 tablet by mouth Daily.,  "Disp: 90 tablet, Rfl: 1    pantoprazole (Protonix) 20 MG EC tablet, Take 1 tablet by mouth Daily., Disp: 90 tablet, Rfl: 1    Testosterone Cypionate (DEPOTESTOTERONE CYPIONATE) 200 MG/ML injection, Inject 1 mL into the appropriate muscle as directed by prescriber Every 7 (Seven) Days., Disp: 4 mL, Rfl: 2    Omega-3 Fatty Acids (fish oil) 1000 MG capsule capsule, Take  by mouth Daily With Breakfast., Disp: , Rfl:     No Known Allergies     Family History   Problem Relation Age of Onset    Stroke Mother     Heart disease Father     Diabetes Other        Social History     Socioeconomic History    Marital status:    Tobacco Use    Smoking status: Never    Smokeless tobacco: Never    Tobacco comments:     History of dip tobacco   Vaping Use    Vaping status: Never Used   Substance and Sexual Activity    Alcohol use: Yes     Comment: socially    Drug use: Never    Sexual activity: Yes     Partners: Female     Birth control/protection: Vasectomy, Partner of same sex       Vital Signs:   Resp 18   Ht 175.3 cm (69\")   Wt 102 kg (225 lb)   BMI 33.23 kg/m²      Physical Exam  Vitals and nursing note reviewed.   Constitutional:       General: He is not in acute distress.     Appearance: Normal appearance. He is not toxic-appearing.   Pulmonary:      Effort: Pulmonary effort is normal. No respiratory distress.   Neurological:      General: No focal deficit present.      Mental Status: He is alert and oriented to person, place, and time.          Result Review :   The following data was reviewed by: ALVAREZ Juarez on 03/25/2025:     PSA          3/10/2025    07:36   PSA   PSA 6.060      Results for orders placed or performed in visit on 03/25/25   Bladder Scan    Collection Time: 03/25/25  3:06 PM   Result Value Ref Range    Volume 3 ML      Bladder Scan interpretation 03/25/2025    Estimation of residual urine via BVI 3000 [address] Bladder Scan  MA/nurse performing: ASHOK Ames  Residual Urine: 3 ml  Indication: " Elevated prostate specific antigen (PSA)    Urinary frequency    Nocturia    Weak urinary stream   Position: Supine  Examination: Incremental scanning of the suprapubic area using 2.0 MHz transducer using copious amounts of acoustic gel.   Findings: An anechoic area was demonstrated which represented the bladder, with measurement of residual urine as noted. I inspected this myself. In that the residual urine was stable or insignificant, refer to plan for treatment and plan necessary at this time.             Procedures        Assessment and Plan    Diagnoses and all orders for this visit:    1. Elevated prostate specific antigen (PSA) (Primary)  -     Bladder Scan  -     Cancel: MRI Prostate With & Without Contrast; Future  -     MRI Prostate With & Without Contrast; Future    2. Urinary frequency    3. Nocturia    4. Weak urinary stream      Elevated PSA-lab results discussed at length with patient.  Discussed several options with patient including a standard prostate biopsy; obtaining an MRI of prostate with possible subsequent fusion biopsy as results dictate; or to treat with a month-long course of antibiotics and then repeat the PSA in 3 months.  After discussion with the patient, we will go ahead and order an MRI of the prostate to delineate any underlying etiology of elevated PSA and potentially provide mapping for fusion biopsy.    Recommended the patient continue to hold his testosterone while we are sorting through potential causes of elevated PSA.    The patient is not overly bothered by his urinary frequency, nocturia or weak urinary stream.  He does not desire any treatment for BPH at this time.  Recommend behavioral modifications including fluid management, limiting fluids before bedtime, elevating legs before bedtime and continuing to use CPAP for treatment of TYLER to help with urinary symptoms.    Discussed with the patient that we usually have him follow-up in the office 1 week after MRI to review  results.  The patient reports that he would prefer to be called with his MRI results so that he does not have to follow-up here in the office.  For now, we will plan to follow-up on an as-needed basis and I will reach out to him with the results of the MRI once that is completed and resulted.            Follow Up   Return if symptoms worsen or fail to improve.  Patient was given instructions and counseling regarding his condition or for health maintenance advice. Please see specific information pulled into the AVS if appropriate.         This document has been electronically signed by ALVAREZ Juarez  March 25, 2025 15:54 EDT

## 2025-03-27 ENCOUNTER — HOSPITAL ENCOUNTER (OUTPATIENT)
Dept: MRI IMAGING | Facility: HOSPITAL | Age: 60
Discharge: HOME | End: 2025-03-27
Admitting: NURSE PRACTITIONER
Payer: COMMERCIAL

## 2025-03-27 DIAGNOSIS — R97.20 ELEVATED PROSTATE SPECIFIC ANTIGEN (PSA): ICD-10-CM

## 2025-03-27 PROCEDURE — A9577 INJ MULTIHANCE: HCPCS | Performed by: NURSE PRACTITIONER

## 2025-03-27 PROCEDURE — 25510000002 GADOBENATE DIMEGLUMINE 529 MG/ML SOLUTION: Performed by: NURSE PRACTITIONER

## 2025-03-27 PROCEDURE — 72197 MRI PELVIS W/O & W/DYE: CPT

## 2025-03-27 RX ORDER — OLMESARTAN MEDOXOMIL 40 MG/1
40 TABLET ORAL DAILY
Qty: 90 TABLET | Refills: 1 | Status: SHIPPED | OUTPATIENT
Start: 2025-03-27

## 2025-03-27 RX ADMIN — GADOBENATE DIMEGLUMINE 10 ML: 529 INJECTION, SOLUTION INTRAVENOUS at 18:14

## 2025-03-28 ENCOUNTER — RESULTS FOLLOW-UP (OUTPATIENT)
Dept: UROLOGY | Age: 60
End: 2025-03-28
Payer: COMMERCIAL

## 2025-03-28 DIAGNOSIS — R97.20 ELEVATED PROSTATE SPECIFIC ANTIGEN (PSA): Primary | ICD-10-CM

## 2025-03-28 NOTE — TELEPHONE ENCOUNTER
Called patient and reviewed prostate MRI results in detail.  We did discuss that the patient does have a large prostate at 38 cc with a PSA density of 0.16 which is slightly higher than I expected.  We did discuss that there were signs of sequelae of prior prostatitis.  The patient is asymptomatic and does not desire treatment with antibiotics at this time.  We did discuss that there were no suspicious/high-grade lesions suggestive of clinically significant prostate malignancy.  The patient's overall exam was graded a PI-RADS 2.  We did discuss that there were findings of colonic diverticulosis and a thickened trabeculated urinary bladder suggestive of bladder outlet obstruction.  The patient denies any bothersome urinary symptoms and is not interested in treatment for BPH at this time.  I did recommend that the patient have repeat PSA in 3 to 6 months.  He already has an appointment to follow-up with his PCP in July and would like her to manage this, but is agreeable to letting me put in a PSA to be rechecked in about 3 months, before that visit with his PCP.  I did offer to see the patient back in the office in follow-up, but he reports that he would prefer to see his PCP and will follow-up with me as needed.

## 2025-04-11 ENCOUNTER — HOSPITAL ENCOUNTER (OUTPATIENT)
Dept: CT IMAGING | Facility: HOSPITAL | Age: 60
Discharge: HOME | End: 2025-04-11
Admitting: INTERNAL MEDICINE

## 2025-04-11 DIAGNOSIS — E78.2 MIXED HYPERLIPIDEMIA: ICD-10-CM

## 2025-04-11 PROCEDURE — 75571 CT HRT W/O DYE W/CA TEST: CPT

## 2025-04-22 ENCOUNTER — OFFICE VISIT (OUTPATIENT)
Dept: SLEEP MEDICINE | Facility: HOSPITAL | Age: 60
End: 2025-04-22
Payer: COMMERCIAL

## 2025-04-22 VITALS
SYSTOLIC BLOOD PRESSURE: 126 MMHG | WEIGHT: 227.7 LBS | OXYGEN SATURATION: 96 % | BODY MASS INDEX: 33.72 KG/M2 | HEART RATE: 90 BPM | DIASTOLIC BLOOD PRESSURE: 67 MMHG | HEIGHT: 69 IN

## 2025-04-22 DIAGNOSIS — G47.34 SLEEP RELATED HYPOXIA: ICD-10-CM

## 2025-04-22 DIAGNOSIS — I10 PRIMARY HYPERTENSION: ICD-10-CM

## 2025-04-22 DIAGNOSIS — G47.61 PLMD (PERIODIC LIMB MOVEMENT DISORDER): ICD-10-CM

## 2025-04-22 DIAGNOSIS — E66.9 OBESITY (BMI 30-39.9): ICD-10-CM

## 2025-04-22 DIAGNOSIS — G47.33 OSA (OBSTRUCTIVE SLEEP APNEA): Primary | ICD-10-CM

## 2025-04-22 PROCEDURE — G0463 HOSPITAL OUTPT CLINIC VISIT: HCPCS

## 2025-04-22 NOTE — PROGRESS NOTES
Sleep Consultation    Patient Name: Jose Louie  Age/Sex: 59 y.o. male  : 1965  MRN: [id number]    Date of Encounter Visit: 2025  Encounter Provider: Trixie Cheng MD  Referring Provider: Self Referring  Place of Service: [facility]  Patient Care Team:  Vielka Mauricio MD as PCP - General (Internal Medicine)    Subjective:     Reason for Consult: Obstructive sleep apnea with sleep hypoxemia    History of Present Illness:  Jose Louie is a 59 y.o. male is here for evaluation of TYLER due to prior diagnosis and for establishment as a regular patient for further follow-up and addressing any future needs.  He did see dr gamez few years ago and has ot followed regularly since     Patient complains of daytime fatigue and sleepiness that resolved on the CPAP with an Lowman Sleepiness Scale (ESS) of 3.  Patient complains of snoring with witnessed apnea waking up gasping for breath that did improve on the CPAP  Patient also had some night sweats  Denies any symptoms of restless leg syndrome.   Patient denies any cataplexy, sleep paralysis or other symptoms to suggest narcolepsy.  Patient denies any parasomnias.  Denies any history of seizure disorder or recent head trauma.  Patient spends adequate amount of time in bed with no evidence of sleep restriction or improper sleep hygiene.  Typical bedtime is around 9:30 PM, wake up time is 6:40 AM with 8-9 hours of sleep in between, sleep onset is within 5 minutes and the patient wakes up feeling good while using the CPAP.  This is an improvement from prior  Comorbidities include: Obstructive sleep apnea, morbid obesity, ADHD, anxiety, hypertension and acid reflux    Review of Systems:   A twelve-system review was conducted and was negative except for the following: Difficulty urinating, frequent urination, anxiety, rash and.        Past Medical History:  Past Medical History:   Diagnosis Date    Acid reflux      Acid reflux     ADHD     Allergies     Anxiety     Arthritis     Back pain     Central serous retinopathy, 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    post surgery    CPAP use counseling 11/27/2017    Deafness     Fracture, foot     Frozen shoulder     Fungal toenail infection 03/04/2019    Heel pain     Hemorrhoid     HTN (hypertension) 12/08/2014    Hyperlipidemia 05/31/2017    Hypogonadism in male 12/08/2014    testicular failure    Hypothyroidism     Knee swelling     Left shoulder pain     Low testosterone level in male 05/31/2017    Night sweats     Panic disorder 12/09/2014    Periarthritis of shoulder 1-4-23    Rotator cuff syndrome 1-4-23    Sleep apnea 11/27/2017       Past Surgical History:   Procedure Laterality Date    ANKLE ARTHROSCOPY W/ OPEN REPAIR      1983, 2017    ANKLE OPEN REDUCTION INTERNAL FIXATION  9-20-82    CATARACT EXTRACTION WITH INTRAOCULAR LENS IMPLANT Bilateral 01/20/2024    CATARACT EXTRACTION WITH INTRAOCULAR LENS IMPLANT Right 02/08/2024    COLONOSCOPY      1/2020; [facility], history of colono polyps    ENDOSCOPY  2020    [facility]    KNEE ARTHROSCOPY W/ MENISCECTOMY Right 5/2/2024    Procedure: KNEE ARTHROSCOPY WITH PARTIAL MEDIAL MENISCECTOMY;  Surgeon: Raúl Noyola MD;  [place] [facility] [place] [facility] [place]  Service: [place]opedics;  Laterality: Right;    SHOULDER ARTHROSCOPY W/ ROTATOR CUFF REPAIR Left 04/26/2023    Procedure: SHOULDER ARTHROSCOPY WITH ROTATOR CUFF REPAIR SUBACROMIAL DECOMPRESSION AND DISTAL CLAVICULECTOMY;  Surgeon: Elver Richards MD;  [place] [facility] [place] [facility] [place]  Service: Orthopedics;  Laterality: Left;    SHOULDER SURGERY  5-3-23    VASECTOMY  1996       Home Medications:     Current Outpatient Medications:     allopurinol (ZYLOPRIM) 100 MG tablet, Take 2 tablets by mouth Daily. (Patient taking differently: Take 1 tablet by mouth Daily.), Disp: 180 tablet, Rfl: 1    colchicine 0.6 MG tablet, Take 1 tablet by mouth daily for 3 days in a row for gout flare, Disp: 90 tablet, Rfl: 1    desvenlafaxine  "(Pristiq) 50 MG 24 hr tablet, Take 1 tablet by mouth Daily., Disp: 90 tablet, Rfl: 1    levothyroxine (SYNTHROID, LEVOTHROID) 150 MCG tablet, Take 1 tablet by mouth Daily., Disp: 90 tablet, Rfl: 3    olmesartan (Benicar) 40 MG tablet, Take 1 tablet by mouth Daily., Disp: 90 tablet, Rfl: 1    Omega-3 Fatty Acids (fish oil) 1000 MG capsule capsule, Take  by mouth Daily With Breakfast., Disp: , Rfl:     pantoprazole (Protonix) 20 MG EC tablet, Take 1 tablet by mouth Daily., Disp: 90 tablet, Rfl: 1    Testosterone Cypionate (DEPOTESTOTERONE CYPIONATE) 200 MG/ML injection, Inject 1 mL into the appropriate muscle as directed by prescriber Every 7 (Seven) Days., Disp: 4 mL, Rfl: 2    Allergies:  No Known Allergies    Past Social History:  Social History     Socioeconomic History    Marital status:    Tobacco Use    Smoking status: Some Days     Types: Cigars    Smokeless tobacco: Never    Tobacco comments:     History of dip tobacco   Vaping Use    Vaping status: Never Used    Passive vaping exposure: Yes   Substance and Sexual Activity    Alcohol use: Yes     Comment: socially    Drug use: Never    Sexual activity: Yes     Partners: Female     Birth control/protection: Vasectomy, Partner of same sex       Past Family History:  Family History   Problem Relation Age of Onset    Stroke Mother     Heart disease Father     Diabetes Other      No known family history obstructive sleep apnea positive family Struve for hypothyroidism  Objective:        Vital Signs:   Visit Vitals  /67   Pulse 90   Ht 175.3 cm (69.02\")   Wt 103 kg (227 lb 11.2 oz)   SpO2 96%   BMI 33.61 kg/m²     Wt Readings from Last 3 Encounters:   04/22/25 103 kg (227 lb 11.2 oz)   03/25/25 102 kg (225 lb)   03/24/25 105 kg (231 lb 12.8 oz)     Neck Circumference: 17.5 inches    Physical Exam:   GEN:  No acute distress, alert, cooperative, well developed   EYES:   Sclerae clear. No icterus. PERRL. Normal EOM  ENT:   External ears/nose normal, no " oral lesions, no thrush, mucous membranes moist, Septum midline. Mallampati IV airway. Enlarged uvula   NECK:  Supple, midline trachea, no JVD  LUNGS: Normal chest on inspection, CTAB, no wheezes. No rhonchi. No crackles. Respirations regular, even and unlabored.   CV:  Regular rhythm and rate. Normal S1/S2. No murmurs, gallops, or rubs noted.  ABD:  Soft, nontender and nondistended. Normal bowel sounds. No guarding  EXT:  Moves all extremities well. No cyanosis. No redness. No edema.   Skin: Dry, intact, no bleeding      Diagnostic Data:  Split sleep study 8/22/2016:  Reported weight on the night of study was 206 pounds  During the diagnostic portion of the sleep study patient had evidence of severe case of obstructive sleep apnea with AHI of 43.8 and RDI of 46.6 with no REM sleep during the night.  Patient has severe periodic leg movement disorder with PLM index of 78.9 with associated arousal index of 6.0  Patient had hypoxemia with desaturation below 90% with 26.6% of total sleep time spent in the hypoxemic range, again in the absence of any REM sleep  During the second portion patient was started on CPAP initial pressure of 5 that was started although up to a pressure of 12.  On a pressure of 10 and above the residual AHI was below 5 and that included REM sleep with good control of the hypoxemia on a pressure of 11 and above.  The parotic leg movement became much less frequent during CPAP titration and there were probably associated with respiratory arousals during the first diagnostic portion of the sleep study      CPAP compliance download 1/20/2025 - 4/19/2025: (Patient has more than 1 machine and he uses them interchangeably and when that taking into consideration his compliance download is at 100%)  Patient had 76.6% compliance with average nightly use of 9 hours and 4 minutes per night on the nights used, 6 hours and 57 minutes per night overall  On a CPAP of 10 with residual AHI of 0.8 and average time  with large air leak of only 11 seconds    Overnight oximetry on CPAP 11/25/2016:  Patient had good control of desaturation and hypoxemia with normal oximetry on that sleep study    Assessment and Plan:       ICD-10-CM ICD-9-CM   1. TYLER (obstructive sleep apnea)  G47.33 327.23   2. PLMD (periodic limb movement disorder)  G47.61 327.51   3. Sleep related hypoxia  G47.34 327.24   4. Obesity (BMI 30-39.9)  E66.9 278.00   5. Primary hypertension  I10 401.9       Recommendations:     Patient had a prior sleep study and he has compliance download and he had a prior overnight oximetry on the CPAP, all of these reports were requested, reviewed and summarized above  Patient had 21 pounds weight gain since his sleep study, his actual weight on today's visit was 227 pounds  He did report that he started exercising regularly and gained substantial amount of lean body mass and his neck circumference is actually better now than what it was back then.  Patient is compliant with the CPAP as evident from the compliance download  Patient is getting both clinical and subjective benefit from the use of the CPAP machine  The CPAP machine is effective in controlling the underlying sleep apnea  CPAP is strongly recommended to be continued  Patient to continue work on the weight loss  No pressure adjustment recommended  Continue with a yearly follow-up or sooner as needed   Patient was educated about the impact of obesity on sleep apnea and the benefit of weight loss and weight loss was recommended    Orders Placed This Encounter   Procedures    PAP Therapy     No orders of the defined types were placed in this encounter.     Return in about 1 year (around 4/22/2026).    Trixie Cheng MD   [facility]   04/22/25  08:53 EDT    Dictated utilizing Dragon dictation

## 2025-04-29 RX ORDER — DESVENLAFAXINE 50 MG/1
50 TABLET, FILM COATED, EXTENDED RELEASE ORAL DAILY
Qty: 90 TABLET | Refills: 1 | Status: SHIPPED | OUTPATIENT
Start: 2025-04-29

## 2025-06-07 ENCOUNTER — PHARMACOGENOMICS (OUTPATIENT)
Dept: PHARMACY | Facility: HOSPITAL | Age: 60
End: 2025-06-07
Payer: COMMERCIAL

## 2025-06-24 DIAGNOSIS — E29.1 HYPOGONADISM IN MALE: ICD-10-CM

## 2025-06-25 RX ORDER — TESTOSTERONE CYPIONATE 200 MG/ML
200 INJECTION, SOLUTION INTRAMUSCULAR
Qty: 4 ML | Refills: 2 | Status: SHIPPED | OUTPATIENT
Start: 2025-06-25

## 2025-07-16 ENCOUNTER — TELEPHONE (OUTPATIENT)
Dept: INTERNAL MEDICINE | Facility: CLINIC | Age: 60
End: 2025-07-16
Payer: COMMERCIAL

## 2025-07-16 DIAGNOSIS — R97.20 ELEVATED PSA: ICD-10-CM

## 2025-07-16 DIAGNOSIS — E78.2 MIXED HYPERLIPIDEMIA: ICD-10-CM

## 2025-07-16 DIAGNOSIS — M1A.0710 CHRONIC GOUT OF RIGHT ANKLE, UNSPECIFIED CAUSE: Primary | ICD-10-CM

## 2025-07-16 DIAGNOSIS — E06.3 HYPOTHYROIDISM DUE TO HASHIMOTO THYROIDITIS: ICD-10-CM

## 2025-07-16 DIAGNOSIS — I10 PRIMARY HYPERTENSION: ICD-10-CM

## 2025-07-16 DIAGNOSIS — E29.1 HYPOGONADISM IN MALE: ICD-10-CM

## 2025-07-16 NOTE — TELEPHONE ENCOUNTER
Caller: Jose Louie    Relationship: Self    Best call back number: [phone]    What orders are you requesting (i.e. lab or imaging): FULL PANEL BLOOD WORK PLUS PSA ALSO     In what timeframe would the patient need to come in: ASAP, WANTING TO HAVE THIS DRAWN THIS COMING FRIDAY BEFORE HIS APPT NEXT WEEK    Where will you receive your lab/imaging services: [facility]

## 2025-07-18 ENCOUNTER — LAB (OUTPATIENT)
Facility: HOSPITAL | Age: 60
End: 2025-07-18
Payer: COMMERCIAL

## 2025-07-18 DIAGNOSIS — E06.3 HYPOTHYROIDISM DUE TO HASHIMOTO THYROIDITIS: ICD-10-CM

## 2025-07-18 DIAGNOSIS — I10 PRIMARY HYPERTENSION: ICD-10-CM

## 2025-07-18 DIAGNOSIS — R97.20 ELEVATED PROSTATE SPECIFIC ANTIGEN (PSA): ICD-10-CM

## 2025-07-18 DIAGNOSIS — M1A.0710 CHRONIC GOUT OF RIGHT ANKLE, UNSPECIFIED CAUSE: ICD-10-CM

## 2025-07-18 LAB
ALBUMIN SERPL-MCNC: 4 G/DL (ref 3.5–5.2)
ALBUMIN/GLOB SERPL: 1.3 G/DL
ALP SERPL-CCNC: 55 U/L (ref 39–117)
ALT SERPL W P-5'-P-CCNC: 47 U/L (ref 1–41)
ANION GAP SERPL CALCULATED.3IONS-SCNC: 9.2 MMOL/L (ref 5–15)
AST SERPL-CCNC: 40 U/L (ref 1–40)
BASOPHILS # BLD AUTO: 0.04 10*3/MM3 (ref 0–0.2)
BASOPHILS NFR BLD AUTO: 0.6 % (ref 0–1.5)
BILIRUB SERPL-MCNC: 0.5 MG/DL (ref 0–1.2)
BUN SERPL-MCNC: 13 MG/DL (ref 6–20)
BUN/CREAT SERPL: 9.6 (ref 7–25)
CALCIUM SPEC-SCNC: 9.3 MG/DL (ref 8.6–10.5)
CHLORIDE SERPL-SCNC: 106 MMOL/L (ref 98–107)
CHOLEST SERPL-MCNC: 217 MG/DL (ref 0–200)
CO2 SERPL-SCNC: 22.8 MMOL/L (ref 22–29)
CREAT SERPL-MCNC: 1.35 MG/DL (ref 0.76–1.27)
DEPRECATED RDW RBC AUTO: 45.3 FL (ref 37–54)
EGFRCR SERPLBLD CKD-EPI 2021: 60.5 ML/MIN/1.73
EOSINOPHIL # BLD AUTO: 0.3 10*3/MM3 (ref 0–0.4)
EOSINOPHIL NFR BLD AUTO: 4.8 % (ref 0.3–6.2)
ERYTHROCYTE [DISTWIDTH] IN BLOOD BY AUTOMATED COUNT: 12.8 % (ref 12.3–15.4)
GLOBULIN UR ELPH-MCNC: 3.1 GM/DL
GLUCOSE SERPL-MCNC: 93 MG/DL (ref 65–99)
HCT VFR BLD AUTO: 49.2 % (ref 37.5–51)
HDLC SERPL-MCNC: 44 MG/DL (ref 40–60)
HGB BLD-MCNC: 16.6 G/DL (ref 13–17.7)
IMM GRANULOCYTES # BLD AUTO: 0.04 10*3/MM3 (ref 0–0.05)
IMM GRANULOCYTES NFR BLD AUTO: 0.6 % (ref 0–0.5)
LDLC SERPL CALC-MCNC: 135 MG/DL (ref 0–100)
LDLC/HDLC SERPL: 2.96 {RATIO}
LYMPHOCYTES # BLD AUTO: 1.58 10*3/MM3 (ref 0.7–3.1)
LYMPHOCYTES NFR BLD AUTO: 25.1 % (ref 19.6–45.3)
MCH RBC QN AUTO: 32.7 PG (ref 26.6–33)
MCHC RBC AUTO-ENTMCNC: 33.7 G/DL (ref 31.5–35.7)
MCV RBC AUTO: 97 FL (ref 79–97)
MONOCYTES # BLD AUTO: 0.71 10*3/MM3 (ref 0.1–0.9)
MONOCYTES NFR BLD AUTO: 11.3 % (ref 5–12)
NEUTROPHILS NFR BLD AUTO: 3.63 10*3/MM3 (ref 1.7–7)
NEUTROPHILS NFR BLD AUTO: 57.6 % (ref 42.7–76)
NRBC BLD AUTO-RTO: 0 /100 WBC (ref 0–0.2)
PLATELET # BLD AUTO: 178 10*3/MM3 (ref 140–450)
PMV BLD AUTO: 12 FL (ref 6–12)
POTASSIUM SERPL-SCNC: 4.4 MMOL/L (ref 3.5–5.2)
PROT SERPL-MCNC: 7.1 G/DL (ref 6–8.5)
PSA SERPL-MCNC: 2.52 NG/ML (ref 0–4)
RBC # BLD AUTO: 5.07 10*6/MM3 (ref 4.14–5.8)
SODIUM SERPL-SCNC: 138 MMOL/L (ref 136–145)
TRIGL SERPL-MCNC: 213 MG/DL (ref 0–150)
TSH SERPL DL<=0.05 MIU/L-ACNC: 1.8 UIU/ML (ref 0.27–4.2)
URATE SERPL-MCNC: 7.5 MG/DL (ref 3.4–7)
VLDLC SERPL-MCNC: 38 MG/DL (ref 5–40)
WBC NRBC COR # BLD AUTO: 6.3 10*3/MM3 (ref 3.4–10.8)

## 2025-07-18 PROCEDURE — 84403 ASSAY OF TOTAL TESTOSTERONE: CPT | Performed by: INTERNAL MEDICINE

## 2025-07-18 PROCEDURE — 84402 ASSAY OF FREE TESTOSTERONE: CPT | Performed by: INTERNAL MEDICINE

## 2025-07-18 PROCEDURE — 80061 LIPID PANEL: CPT | Performed by: INTERNAL MEDICINE

## 2025-07-18 PROCEDURE — 84153 ASSAY OF PSA TOTAL: CPT

## 2025-07-18 PROCEDURE — 36415 COLL VENOUS BLD VENIPUNCTURE: CPT | Performed by: INTERNAL MEDICINE

## 2025-07-18 PROCEDURE — 80050 GENERAL HEALTH PANEL: CPT | Performed by: INTERNAL MEDICINE

## 2025-07-18 PROCEDURE — 84550 ASSAY OF BLOOD/URIC ACID: CPT

## 2025-07-21 ENCOUNTER — OFFICE VISIT (OUTPATIENT)
Dept: INTERNAL MEDICINE | Facility: CLINIC | Age: 60
End: 2025-07-21
Payer: COMMERCIAL

## 2025-07-21 VITALS
RESPIRATION RATE: 20 BRPM | SYSTOLIC BLOOD PRESSURE: 136 MMHG | OXYGEN SATURATION: 96 % | WEIGHT: 226.2 LBS | HEART RATE: 93 BPM | TEMPERATURE: 98.8 F | DIASTOLIC BLOOD PRESSURE: 60 MMHG | BODY MASS INDEX: 33.5 KG/M2 | HEIGHT: 69 IN

## 2025-07-21 DIAGNOSIS — I10 PRIMARY HYPERTENSION: ICD-10-CM

## 2025-07-21 DIAGNOSIS — M1A.0710 CHRONIC GOUT OF RIGHT ANKLE, UNSPECIFIED CAUSE: ICD-10-CM

## 2025-07-21 DIAGNOSIS — F41.9 ANXIETY: ICD-10-CM

## 2025-07-21 DIAGNOSIS — E06.3 HYPOTHYROIDISM DUE TO HASHIMOTO THYROIDITIS: ICD-10-CM

## 2025-07-21 DIAGNOSIS — E78.2 MIXED HYPERLIPIDEMIA: Primary | ICD-10-CM

## 2025-07-21 DIAGNOSIS — E29.1 HYPOGONADISM IN MALE: ICD-10-CM

## 2025-07-21 DIAGNOSIS — K64.9 HEMORRHOIDS, UNSPECIFIED HEMORRHOID TYPE: ICD-10-CM

## 2025-07-21 PROCEDURE — 99214 OFFICE O/P EST MOD 30 MIN: CPT | Performed by: INTERNAL MEDICINE

## 2025-07-21 RX ORDER — CLOTRIMAZOLE 1 G/ML
SOLUTION TOPICAL 2 TIMES DAILY
Qty: 15 ML | Refills: 1 | Status: SHIPPED | OUTPATIENT
Start: 2025-07-21

## 2025-07-21 RX ORDER — HYDROCORTISONE 25 MG/G
CREAM TOPICAL 2 TIMES DAILY
Qty: 30 G | Refills: 1 | Status: SHIPPED | OUTPATIENT
Start: 2025-07-21

## 2025-07-21 RX ORDER — LEVOTHYROXINE SODIUM 137 UG/1
137 TABLET ORAL DAILY
Qty: 90 TABLET | Refills: 1 | Status: SHIPPED | OUTPATIENT
Start: 2025-07-21

## 2025-07-21 NOTE — PROGRESS NOTES
Chief Complaint  Anxiety, Hyperlipidemia, Hypertension, Hypogonadism (3 mo f/u ), Results (Psa and spit test ), Eczema (On right arm ), and Hemorrhoids (Would like to discuss )      Subjective      History of Present Illness  The patient presents for evaluation of stress, gout, thyroid issues, blood pressure management, eczema, heat rash, hemorrhoids, and prostate issues.    Stress has improved with Pristiq, though he feels more physically exhausted. He is covering his partners maternity leave for another week and a half.   Mental health is doing well this time.    Taking allopurinol 100 mg daily for gout, with some joint discomfort attributed to wear and tear. Keeps colchicine for potential flare-ups.    Reluctant to take thyroid medication due to feeling hot and high heart rate. On thyroid medication for 7-10 years, dosage gradually increasing. Considering reducing thyroid medication dosage.    Blood pressure readings at home and clinic are consistent, systolic values in the 130s. Managing blood pressure with two medications, resulting in diastolic readings in the 60s. Experiences dizziness with additional medication.    Eczema spreading slightly, causing itchiness. Small bumps present for about a month, possibly ingrown hairs. Similar bumps on back attributed to sweating. Plans to monitor condition as weather cools down.    Hemorrhoids causing more discomfort, occasional bleeding and itching. Using over-the-counter hemorrhoid cream, prefers cream over suppositories. History of polyps, colonoscopy in 2021.    Prostate enlargement causing nocturia, up to three times per night. Considering medication for this issue.    Testosterone levels stable, occasionally misses doses due to prescription refills. Attributes fatigue to heat, uses liquid IVs to prevent dehydration. No cramps, stays indoors in air-conditioned environments due to heat.    Protonix 20 mg working well for stomach issues.             Objective   Vital  "Signs:   Vitals:    07/21/25 1004   BP: 136/60   BP Location: Left arm   Patient Position: Sitting   Cuff Size: Adult   Pulse: 93   Resp: 20   Temp: 98.8 °F (37.1 °C)   TempSrc: Temporal   SpO2: 96%   Weight: 103 kg (226 lb 3.2 oz)   Height: 175.3 cm (69.02\")     Body mass index is 33.39 kg/m².    Wt Readings from Last 3 Encounters:   07/21/25 103 kg (226 lb 3.2 oz)   04/22/25 103 kg (227 lb 11.2 oz)   03/25/25 102 kg (225 lb)     BP Readings from Last 3 Encounters:   07/21/25 136/60   04/22/25 126/67   03/24/25 128/72       Health Maintenance   Topic Date Due    Pneumococcal Vaccine 50+ (1 of 2 - PCV) Never done    ZOSTER VACCINE (1 of 2) Never done    ANNUAL PHYSICAL  Never done    COVID-19 Vaccine (6 - 2024-25 season) 08/04/2025 (Originally 9/1/2024)    INFLUENZA VACCINE  10/01/2025    LIPID PANEL  07/18/2026    TDAP/TD VACCINES (3 - Td or Tdap) 07/21/2027    COLORECTAL CANCER SCREENING  01/20/2030    HEPATITIS C SCREENING  Completed       Physical Exam  Vitals reviewed.   Constitutional:       Appearance: Normal appearance. He is well-developed.   HENT:      Head: Normocephalic and atraumatic.      Right Ear: External ear normal.      Left Ear: External ear normal.   Eyes:      Conjunctiva/sclera: Conjunctivae normal.      Pupils: Pupils are equal, round, and reactive to light.   Cardiovascular:      Rate and Rhythm: Normal rate and regular rhythm.      Heart sounds: No murmur heard.     No friction rub. No gallop.   Pulmonary:      Effort: Pulmonary effort is normal.      Breath sounds: Normal breath sounds. No wheezing or rhonchi.   Skin:     General: Skin is warm and dry.   Neurological:      Mental Status: He is alert and oriented to person, place, and time.   Psychiatric:         Mood and Affect: Affect normal.         Behavior: Behavior normal.         Thought Content: Thought content normal.        Physical Exam    Skin: Dry flaky area on arm, extending upwards. Multiple small bumps on borders of eczema. " Red border rash on back and hips, likely heat rash.  Rectal: Internal hemorrhoid noted, with itching and occasional bleeding.  Other: Pulse rate 93.      Result Review :  The following data was reviewed by: Vielka Mauricio MD on 07/21/2025:         Results  Reviewed recent labs  Reviewed coronary CT scan           Procedures            Assessment & Plan  Mixed hyperlipidemia            Primary hypertension           Hypothyroidism due to Hashimoto thyroiditis    Orders:    T4, Free; Future    TSH; Future    Hypogonadism in male         Chronic gout of right ankle, unspecified cause         Anxiety         Hemorrhoids, unspecified hemorrhoid type              Assessment & Plan  Stress  - Improved with Pristiq  - No changes in medication    Gout  - Levels increased but acceptable  - Increase allopurinol to 200 mg if comfortable; otherwise, maintain current dosage  - Take colchicine for flare-ups  - Discussed Uloric (febuxostat) as alternative    Thyroid issues  - TSH normal, symptoms of heat and high heart rate  - Reduce thyroid medication from 150 mcg to 137 mcg  - Monitor symptoms, repeat blood work in 6-8 weeks    Blood pressure management  - Systolic readings in 130s  - Discussed adjusting olmesartan dosage or switching to combination pill with amlodipine  - Monitor blood pressure, report readings above 140    Eczema  - Itchy rash expanded  - Use clobetasol cream initially  - Prescription for clotrimazole cream if steroid cream ineffective    Heat rash  - Rash on back and hips due to sweating  - Use dry wick shirts or remove shirt during activities  - Prescription for clotrimazole cream if needed  - Discussed chafing stick    Hemorrhoids  - Itching and bleeding  - Prescription for Anusol cream  - Avoid pressure on area, manage constipation  - Stay up-to-date on colonoscopy screenings    Prostate issues  - Nocturia and incomplete bladder emptying  - Discussed medication to shrink prostate and improve  urination  - Monitor symptoms, consider medication if nocturia worsens    Testosterone management  - Levels within desired range 4 months ago  - Goal: maintain levels between 600 and 900  - Repeat testosterone test in 3 months    Follow-up  - Scheduled in 3 months    Patient or patient representative verbalized consent for the use of Ambient Listening during the visit with  Vielka Mauricio MD for chart documentation. 7/21/2025  11:16 EDT      FOLLOW UP  Return in about 3 months (around 10/21/2025).  Patient was given instructions and counseling regarding his condition or for health maintenance advice. Please see specific information pulled into the AVS if appropriate.     Vielka Maruicio MD  07/21/25  11:24 EDT    CURRENT & DISCONTINUED MEDICATIONS  Current Outpatient Medications   Medication Instructions    allopurinol (ZYLOPRIM) 200 mg, Oral, Daily    clotrimazole (LOTRIMIN) 1 % external solution Topical, 2 Times Daily    colchicine 0.6 MG tablet Take 1 tablet by mouth daily for 3 days in a row for gout flare    desvenlafaxine (PRISTIQ) 50 mg, Oral, Daily    Hydrocortisone, Perianal, (ANUSOL-HC) 2.5 % rectal cream Rectal, 2 Times Daily    levothyroxine (SYNTHROID, LEVOTHROID) 137 mcg, Oral, Daily    olmesartan (BENICAR) 40 mg, Oral, Daily    Omega-3 Fatty Acids (fish oil) 1000 MG capsule capsule Daily With Breakfast    pantoprazole (PROTONIX) 20 mg, Oral, Daily    Testosterone Cypionate (DEPOTESTOTERONE CYPIONATE) 200 mg, Intramuscular, Every 7 Days       Medications Discontinued During This Encounter   Medication Reason    levothyroxine (SYNTHROID, LEVOTHROID) 150 MCG tablet Reorder

## 2025-07-22 LAB
TESTOST FREE SERPL-MCNC: 41.3 PG/ML (ref 7.2–24)
TESTOST SERPL-MCNC: >1500 NG/DL (ref 264–916)

## 2025-08-11 DIAGNOSIS — E29.1 HYPOGONADISM IN MALE: ICD-10-CM

## 2025-08-12 RX ORDER — TESTOSTERONE CYPIONATE 200 MG/ML
200 INJECTION, SOLUTION INTRAMUSCULAR
Qty: 4 ML | Refills: 2 | Status: SHIPPED | OUTPATIENT
Start: 2025-08-12

## (undated) DEVICE — APPL CHLORAPREP HI/LITE 26ML ORNG

## (undated) DEVICE — ANTIBACTERIAL VIOLET BRAIDED (POLYGLACTIN 910), SYNTHETIC ABSORBABLE SURGICAL SUTURE: Brand: COATED VICRYL

## (undated) DEVICE — GLV SURG SENSICARE PI PF LF 7 GRN STRL

## (undated) DEVICE — BLD CUT FORMLA AGGR PLS 4.0MM

## (undated) DEVICE — GLOVE,SURG,SENSICARE SLT,LF,PF,7: Brand: MEDLINE

## (undated) DEVICE — SUT MNCRYL PLS ANTIB UD 3/0 PS2 27IN

## (undated) DEVICE — 90-S CRUISE, SUCTION PROBE, NON-BENDABLE, MAX CUT LEVEL 1: Brand: SERFAS ENERGY

## (undated) DEVICE — DRSNG WND GZ CURAD OIL EMULSION 3X3IN STRL

## (undated) DEVICE — FMS FLUID MANAGEMENT SYSTEM OUTFLOW TUBING WITHOUT ONE-WAY VALVE (FMS VUE OR FMS DUO PLUS): Brand: FMS

## (undated) DEVICE — GLV SURG BIOGEL LTX PF 8 1/2

## (undated) DEVICE — INTENDED FOR TISSUE SEPARATION, AND OTHER PROCEDURES THAT REQUIRE A SHARP SURGICAL BLADE TO PUNCTURE OR CUT.: Brand: BARD-PARKER ® CARBON RIB-BACK BLADES

## (undated) DEVICE — STERILE POLYISOPRENE POWDER-FREE SURGICAL GLOVES: Brand: PROTEXIS

## (undated) DEVICE — SUT VIC 2/0 CT1 36IN

## (undated) DEVICE — GLV SURG ULTRAFREE MAX LTX PF 8

## (undated) DEVICE — PENCL E/S SMOKEEVAC W/TELESCP CANN

## (undated) DEVICE — FMS FLUID MANAGEMENT SYSTEM INFLOW TUBING (FMS VUE): Brand: FMS

## (undated) DEVICE — SUT ETHLN 3-0 FS118IN 663H

## (undated) DEVICE — DRSNG PAD ABD 8X10IN STRL

## (undated) DEVICE — TBG INFLOW FMS 24 GRN

## (undated) DEVICE — OSC BEACH CHAIR SHOULDER-LF: Brand: MEDLINE INDUSTRIES, INC.

## (undated) DEVICE — SOL IRR NACL 0.9PCT 3000ML

## (undated) DEVICE — DRESSING,GAUZE,XEROFORM,CURAD,1"X8",ST: Brand: CURAD

## (undated) DEVICE — TBG OUTFLOW FMS 24 ORNG

## (undated) DEVICE — BUR BRL FORMLA 12FLUT 4MM

## (undated) DEVICE — KNEE ARTHROSCOPY-LF: Brand: MEDLINE INDUSTRIES, INC.

## (undated) DEVICE — GOWN,REINFORCE,POLY,SIRUS,BREATH SLV,XLG: Brand: MEDLINE

## (undated) DEVICE — BNDG ELAS MATRX V/CLS 6INX10YD LF

## (undated) DEVICE — GLV SURG SENSICARE SLT PF LF 7 STRL

## (undated) DEVICE — ELECTRD BLD EDGE COAT 3IN